# Patient Record
Sex: MALE | Race: WHITE | NOT HISPANIC OR LATINO | Employment: OTHER | URBAN - METROPOLITAN AREA
[De-identification: names, ages, dates, MRNs, and addresses within clinical notes are randomized per-mention and may not be internally consistent; named-entity substitution may affect disease eponyms.]

---

## 2017-05-19 ENCOUNTER — APPOINTMENT (OUTPATIENT)
Dept: WOUND CARE | Facility: HOSPITAL | Age: 57
End: 2017-05-19
Attending: SURGERY

## 2017-05-25 ENCOUNTER — APPOINTMENT (OUTPATIENT)
Dept: WOUND CARE | Facility: HOSPITAL | Age: 57
End: 2017-05-25
Attending: SURGERY

## 2017-05-25 ENCOUNTER — TRANSCRIBE ORDERS (OUTPATIENT)
Dept: ADMINISTRATIVE | Facility: HOSPITAL | Age: 57
End: 2017-05-25

## 2017-05-25 DIAGNOSIS — E11.622 DIABETES MELLITUS WITH SKIN ULCER (HCC): ICD-10-CM

## 2017-05-25 DIAGNOSIS — L98.499 DIABETES MELLITUS WITH SKIN ULCER (HCC): ICD-10-CM

## 2017-05-25 DIAGNOSIS — T87.44 INFECTION OF AMPUTATION STUMP OF LEFT LOWER EXTREMITY (HCC): ICD-10-CM

## 2017-05-25 DIAGNOSIS — T87.44 INFECTION OF LEFT BELOW KNEE AMPUTATION (HCC): Primary | ICD-10-CM

## 2017-05-25 PROCEDURE — G0463 HOSPITAL OUTPT CLINIC VISIT: HCPCS

## 2017-05-31 ENCOUNTER — TRANSCRIBE ORDERS (OUTPATIENT)
Dept: WOUND CARE | Facility: HOSPITAL | Age: 57
End: 2017-05-31

## 2017-05-31 ENCOUNTER — LAB (OUTPATIENT)
Dept: LAB | Facility: HOSPITAL | Age: 57
End: 2017-05-31
Attending: SURGERY

## 2017-05-31 ENCOUNTER — HOSPITAL ENCOUNTER (OUTPATIENT)
Dept: MRI IMAGING | Facility: HOSPITAL | Age: 57
Discharge: HOME | End: 2017-05-31
Attending: SURGERY | Admitting: SURGERY

## 2017-05-31 DIAGNOSIS — T87.44 INFECTION OF AMPUTATION STUMP OF LEFT LOWER EXTREMITY (HCC): ICD-10-CM

## 2017-05-31 DIAGNOSIS — E11.622 DIABETES MELLITUS WITH SKIN ULCER (HCC): ICD-10-CM

## 2017-05-31 DIAGNOSIS — T87.44 INFECTION OF LEFT BELOW KNEE AMPUTATION (HCC): ICD-10-CM

## 2017-05-31 DIAGNOSIS — L98.499 DIABETES MELLITUS WITH SKIN ULCER (HCC): ICD-10-CM

## 2017-05-31 DIAGNOSIS — T87.44 INFECTION OF LEFT BELOW KNEE AMPUTATION (HCC): Primary | ICD-10-CM

## 2017-05-31 LAB
ALBUMIN SERPL-MCNC: 3.2 G/DL (ref 3.5–5.2)
ALBUMIN/GLOB SERPL: 0.9 G/DL
ALP SERPL-CCNC: 86 U/L (ref 39–117)
ALT SERPL W P-5'-P-CCNC: 10 U/L (ref 1–41)
ANION GAP SERPL CALCULATED.3IONS-SCNC: 15.1 MMOL/L
AST SERPL-CCNC: 13 U/L (ref 1–40)
BASOPHILS # BLD AUTO: 0.03 10*3/MM3 (ref 0–0.2)
BASOPHILS NFR BLD AUTO: 0.4 % (ref 0–1.5)
BILIRUB SERPL-MCNC: 0.2 MG/DL (ref 0.1–1.2)
BUN BLD-MCNC: 30 MG/DL (ref 6–20)
BUN/CREAT SERPL: 16.6 (ref 7–25)
CALCIUM SPEC-SCNC: 8.6 MG/DL (ref 8.6–10.5)
CHLORIDE SERPL-SCNC: 105 MMOL/L (ref 98–107)
CO2 SERPL-SCNC: 19.9 MMOL/L (ref 22–29)
CREAT BLD-MCNC: 1.81 MG/DL (ref 0.76–1.27)
CRP SERPL-MCNC: 1.16 MG/DL (ref 0–0.5)
DEPRECATED RDW RBC AUTO: 48.1 FL (ref 37–54)
EOSINOPHIL # BLD AUTO: 0.43 10*3/MM3 (ref 0–0.7)
EOSINOPHIL NFR BLD AUTO: 5.2 % (ref 0.3–6.2)
ERYTHROCYTE [DISTWIDTH] IN BLOOD BY AUTOMATED COUNT: 14.4 % (ref 11.5–14.5)
ERYTHROCYTE [SEDIMENTATION RATE] IN BLOOD: >140 MM/HR (ref 0–20)
GFR SERPL CREATININE-BSD FRML MDRD: 39 ML/MIN/1.73
GLOBULIN UR ELPH-MCNC: 3.6 GM/DL
GLUCOSE BLD-MCNC: 99 MG/DL (ref 65–99)
HCT VFR BLD AUTO: 29.7 % (ref 40.4–52.2)
HGB BLD-MCNC: 10.2 G/DL (ref 13.7–17.6)
IMM GRANULOCYTES # BLD: 0.02 10*3/MM3 (ref 0–0.03)
IMM GRANULOCYTES NFR BLD: 0.2 % (ref 0–0.5)
LYMPHOCYTES # BLD AUTO: 1.08 10*3/MM3 (ref 0.9–4.8)
LYMPHOCYTES NFR BLD AUTO: 13.2 % (ref 19.6–45.3)
MCH RBC QN AUTO: 31.8 PG (ref 27–32.7)
MCHC RBC AUTO-ENTMCNC: 34.3 G/DL (ref 32.6–36.4)
MCV RBC AUTO: 92.5 FL (ref 79.8–96.2)
MONOCYTES # BLD AUTO: 0.58 10*3/MM3 (ref 0.2–1.2)
MONOCYTES NFR BLD AUTO: 7.1 % (ref 5–12)
NEUTROPHILS # BLD AUTO: 6.07 10*3/MM3 (ref 1.9–8.1)
NEUTROPHILS NFR BLD AUTO: 73.9 % (ref 42.7–76)
PLATELET # BLD AUTO: 188 10*3/MM3 (ref 140–500)
PMV BLD AUTO: 9.3 FL (ref 6–12)
POTASSIUM BLD-SCNC: 3.5 MMOL/L (ref 3.5–5.2)
PROT SERPL-MCNC: 6.8 G/DL (ref 6–8.5)
RBC # BLD AUTO: 3.21 10*6/MM3 (ref 4.6–6)
SODIUM BLD-SCNC: 140 MMOL/L (ref 136–145)
WBC NRBC COR # BLD: 8.21 10*3/MM3 (ref 4.5–10.7)

## 2017-05-31 PROCEDURE — 85025 COMPLETE CBC W/AUTO DIFF WBC: CPT

## 2017-05-31 PROCEDURE — A9577 INJ MULTIHANCE: HCPCS | Performed by: SURGERY

## 2017-05-31 PROCEDURE — 82565 ASSAY OF CREATININE: CPT

## 2017-05-31 PROCEDURE — 80053 COMPREHEN METABOLIC PANEL: CPT

## 2017-05-31 PROCEDURE — 0 GADOBENATE DIMEGLUMINE 529 MG/ML SOLUTION: Performed by: SURGERY

## 2017-05-31 PROCEDURE — 85652 RBC SED RATE AUTOMATED: CPT

## 2017-05-31 PROCEDURE — 36415 COLL VENOUS BLD VENIPUNCTURE: CPT

## 2017-05-31 PROCEDURE — 73720 MRI LWR EXTREMITY W/O&W/DYE: CPT

## 2017-05-31 PROCEDURE — 86140 C-REACTIVE PROTEIN: CPT

## 2017-05-31 RX ADMIN — GADOBENATE DIMEGLUMINE 20 ML: 529 INJECTION, SOLUTION INTRAVENOUS at 07:34

## 2017-06-01 LAB — CREAT BLDA-MCNC: 1.8 MG/DL (ref 0.6–1.3)

## 2017-06-02 ENCOUNTER — APPOINTMENT (OUTPATIENT)
Dept: WOUND CARE | Facility: HOSPITAL | Age: 57
End: 2017-06-02
Attending: SURGERY

## 2017-06-02 PROCEDURE — G0463 HOSPITAL OUTPT CLINIC VISIT: HCPCS

## 2017-06-09 ENCOUNTER — APPOINTMENT (OUTPATIENT)
Dept: WOUND CARE | Facility: HOSPITAL | Age: 57
End: 2017-06-09
Attending: SURGERY

## 2017-06-16 ENCOUNTER — LAB REQUISITION (OUTPATIENT)
Dept: LAB | Facility: HOSPITAL | Age: 57
End: 2017-06-16

## 2017-06-16 ENCOUNTER — APPOINTMENT (OUTPATIENT)
Dept: WOUND CARE | Facility: HOSPITAL | Age: 57
End: 2017-06-16
Attending: SURGERY

## 2017-06-16 DIAGNOSIS — E11.622 TYPE 2 DIABETES MELLITUS WITH OTHER SKIN ULCER (CODE) (HCC): ICD-10-CM

## 2017-06-16 DIAGNOSIS — T87.44 INFECTION OF AMPUTATION STUMP OF LEFT LOWER EXTREMITY (HCC): ICD-10-CM

## 2017-06-16 PROCEDURE — 87205 SMEAR GRAM STAIN: CPT | Performed by: SURGERY

## 2017-06-16 PROCEDURE — 87070 CULTURE OTHR SPECIMN AEROBIC: CPT | Performed by: SURGERY

## 2017-06-16 PROCEDURE — 87186 SC STD MICRODIL/AGAR DIL: CPT | Performed by: SURGERY

## 2017-06-16 PROCEDURE — 87075 CULTR BACTERIA EXCEPT BLOOD: CPT | Performed by: SURGERY

## 2017-06-16 PROCEDURE — G0463 HOSPITAL OUTPT CLINIC VISIT: HCPCS

## 2017-06-18 LAB
BACTERIA SPEC AEROBE CULT: ABNORMAL
GRAM STN SPEC: ABNORMAL
GRAM STN SPEC: ABNORMAL

## 2017-06-21 LAB — BACTERIA SPEC ANAEROBE CULT: NORMAL

## 2017-07-03 ENCOUNTER — TRANSCRIBE ORDERS (OUTPATIENT)
Dept: ADMINISTRATIVE | Facility: HOSPITAL | Age: 57
End: 2017-07-03

## 2017-07-03 ENCOUNTER — OFFICE VISIT (OUTPATIENT)
Dept: INFECTIOUS DISEASES | Facility: CLINIC | Age: 57
End: 2017-07-03

## 2017-07-03 VITALS
SYSTOLIC BLOOD PRESSURE: 176 MMHG | HEIGHT: 74 IN | HEART RATE: 60 BPM | DIASTOLIC BLOOD PRESSURE: 73 MMHG | WEIGHT: 220 LBS | TEMPERATURE: 98.5 F | BODY MASS INDEX: 28.23 KG/M2

## 2017-07-03 DIAGNOSIS — M86.18 ACUTE OSTEOMYELITIS OF OTHER SITE: Primary | ICD-10-CM

## 2017-07-03 DIAGNOSIS — I73.9 PVD (PERIPHERAL VASCULAR DISEASE) (HCC): ICD-10-CM

## 2017-07-03 DIAGNOSIS — M86.172 OSTEOMYELITIS OF FOOT, LEFT, ACUTE (HCC): Primary | ICD-10-CM

## 2017-07-03 DIAGNOSIS — A49.02 MRSA INFECTION: ICD-10-CM

## 2017-07-03 DIAGNOSIS — E11.21 CONTROLLED TYPE 2 DIABETES MELLITUS WITH DIABETIC NEPHROPATHY, WITHOUT LONG-TERM CURRENT USE OF INSULIN (HCC): ICD-10-CM

## 2017-07-03 PROCEDURE — 99205 OFFICE O/P NEW HI 60 MIN: CPT | Performed by: INTERNAL MEDICINE

## 2017-07-03 RX ORDER — POTASSIUM CHLORIDE 20 MEQ/1
20 TABLET, EXTENDED RELEASE ORAL 2 TIMES DAILY
Status: ON HOLD | COMMUNITY
End: 2017-07-14

## 2017-07-03 RX ORDER — CHLORAL HYDRATE 500 MG
CAPSULE ORAL
COMMUNITY

## 2017-07-03 RX ORDER — ATORVASTATIN CALCIUM 40 MG/1
40 TABLET, FILM COATED ORAL DAILY
COMMUNITY

## 2017-07-03 RX ORDER — SERTRALINE HYDROCHLORIDE 100 MG/1
100 TABLET, FILM COATED ORAL DAILY
COMMUNITY

## 2017-07-03 RX ORDER — OXYCODONE AND ACETAMINOPHEN 10; 325 MG/1; MG/1
1 TABLET ORAL EVERY 6 HOURS PRN
Status: ON HOLD | COMMUNITY
End: 2017-07-25

## 2017-07-03 RX ORDER — FERROUS SULFATE 325(65) MG
325 TABLET ORAL
Status: ON HOLD | COMMUNITY
End: 2017-07-14

## 2017-07-03 RX ORDER — RANITIDINE 150 MG/1
150 TABLET ORAL 2 TIMES DAILY
Status: ON HOLD | COMMUNITY
End: 2017-07-14 | Stop reason: ALTCHOICE

## 2017-07-03 RX ORDER — ASPIRIN 81 MG/1
81 TABLET ORAL DAILY
COMMUNITY
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
## 2017-07-05 ENCOUNTER — TELEPHONE (OUTPATIENT)
Dept: INFECTIOUS DISEASES | Facility: CLINIC | Age: 57
End: 2017-07-05

## 2017-07-05 ENCOUNTER — APPOINTMENT (OUTPATIENT)
Dept: WOUND CARE | Facility: HOSPITAL | Age: 57
End: 2017-07-05
Attending: SURGERY

## 2017-07-05 DIAGNOSIS — M86.152 ACUTE OSTEOMYELITIS OF LEFT FEMUR (HCC): Primary | ICD-10-CM

## 2017-07-05 PROCEDURE — G0463 HOSPITAL OUTPT CLINIC VISIT: HCPCS

## 2017-07-05 NOTE — TELEPHONE ENCOUNTER
"Phone with Mr. Blackwood. I called to inform him of his PICC line insertion tomorrow, his blood work that needs to be drawn tomorrow (order in per Dr. Jung), his follow up appointment for 8/14/17 and his co-pay for the his Vancomycin is $1900.00 per Celestine with [facility]. Patient said that is okay because he is getting his Medicaid card tomorrow and \"they will back date everything for 3 months\". I told him I was not sure of that and if not, the cost could be out of pocket. He said it was okay. I called Celestine with [facility] and he will have his IV ABX to him by Friday. Advised him to call us with any questions or problems once he begins the ABX. Maddie with [facility] has him set up for weekly dressing changes and labs beginning 7/10/17--Liss Villegas RN  "

## 2017-07-06 ENCOUNTER — LAB (OUTPATIENT)
Dept: LAB | Facility: HOSPITAL | Age: 57
End: 2017-07-06

## 2017-07-06 ENCOUNTER — HOSPITAL ENCOUNTER (OUTPATIENT)
Dept: GENERAL RADIOLOGY | Facility: HOSPITAL | Age: 57
Discharge: HOME | End: 2017-07-06
Attending: INTERNAL MEDICINE | Admitting: INTERNAL MEDICINE

## 2017-07-06 VITALS
RESPIRATION RATE: 18 BRPM | DIASTOLIC BLOOD PRESSURE: 67 MMHG | BODY MASS INDEX: 28.23 KG/M2 | SYSTOLIC BLOOD PRESSURE: 173 MMHG | TEMPERATURE: 98.4 F | HEIGHT: 74 IN | WEIGHT: 220 LBS | OXYGEN SATURATION: 98 % | HEART RATE: 64 BPM

## 2017-07-06 DIAGNOSIS — M86.152 ACUTE OSTEOMYELITIS OF LEFT FEMUR (HCC): ICD-10-CM

## 2017-07-06 DIAGNOSIS — M86.172 OSTEOMYELITIS OF FOOT, LEFT, ACUTE (HCC): ICD-10-CM

## 2017-07-06 DIAGNOSIS — A49.02 MRSA INFECTION: ICD-10-CM

## 2017-07-06 LAB
ALBUMIN SERPL-MCNC: 3.1 G/DL (ref 3.5–5.2)
ALBUMIN/GLOB SERPL: 0.9 G/DL
ALP SERPL-CCNC: 94 U/L (ref 39–117)
ALT SERPL W P-5'-P-CCNC: 12 U/L (ref 1–41)
ANION GAP SERPL CALCULATED.3IONS-SCNC: 12 MMOL/L
AST SERPL-CCNC: 11 U/L (ref 1–40)
BASOPHILS # BLD AUTO: 0.04 10*3/MM3 (ref 0–0.2)
BASOPHILS NFR BLD AUTO: 0.5 % (ref 0–1.5)
BILIRUB SERPL-MCNC: <0.2 MG/DL (ref 0.1–1.2)
BUN BLD-MCNC: 23 MG/DL (ref 6–20)
BUN/CREAT SERPL: 11.4 (ref 7–25)
CALCIUM SPEC-SCNC: 8.9 MG/DL (ref 8.6–10.5)
CHLORIDE SERPL-SCNC: 103 MMOL/L (ref 98–107)
CO2 SERPL-SCNC: 25 MMOL/L (ref 22–29)
CREAT BLD-MCNC: 2.01 MG/DL (ref 0.76–1.27)
CRP SERPL-MCNC: 1.3 MG/DL (ref 0–0.5)
DEPRECATED RDW RBC AUTO: 48.8 FL (ref 37–54)
EOSINOPHIL # BLD AUTO: 0.45 10*3/MM3 (ref 0–0.7)
EOSINOPHIL NFR BLD AUTO: 5.2 % (ref 0.3–6.2)
ERYTHROCYTE [DISTWIDTH] IN BLOOD BY AUTOMATED COUNT: 14.3 % (ref 11.5–14.5)
ERYTHROCYTE [SEDIMENTATION RATE] IN BLOOD: >140 MM/HR (ref 0–20)
GFR SERPL CREATININE-BSD FRML MDRD: 34 ML/MIN/1.73
GLOBULIN UR ELPH-MCNC: 3.5 GM/DL
GLUCOSE BLD-MCNC: 347 MG/DL (ref 65–99)
HCT VFR BLD AUTO: 26.9 % (ref 40.4–52.2)
HGB BLD-MCNC: 9.2 G/DL (ref 13.7–17.6)
IMM GRANULOCYTES # BLD: 0.04 10*3/MM3 (ref 0–0.03)
IMM GRANULOCYTES NFR BLD: 0.5 % (ref 0–0.5)
LYMPHOCYTES # BLD AUTO: 0.96 10*3/MM3 (ref 0.9–4.8)
LYMPHOCYTES NFR BLD AUTO: 11.1 % (ref 19.6–45.3)
MCH RBC QN AUTO: 31.7 PG (ref 27–32.7)
MCHC RBC AUTO-ENTMCNC: 34.2 G/DL (ref 32.6–36.4)
MCV RBC AUTO: 92.8 FL (ref 79.8–96.2)
MONOCYTES # BLD AUTO: 0.52 10*3/MM3 (ref 0.2–1.2)
MONOCYTES NFR BLD AUTO: 6 % (ref 5–12)
NEUTROPHILS # BLD AUTO: 6.65 10*3/MM3 (ref 1.9–8.1)
NEUTROPHILS NFR BLD AUTO: 76.7 % (ref 42.7–76)
PLATELET # BLD AUTO: 209 10*3/MM3 (ref 140–500)
PMV BLD AUTO: 8.9 FL (ref 6–12)
POTASSIUM BLD-SCNC: 4.4 MMOL/L (ref 3.5–5.2)
PROT SERPL-MCNC: 6.6 G/DL (ref 6–8.5)
RBC # BLD AUTO: 2.9 10*6/MM3 (ref 4.6–6)
SODIUM BLD-SCNC: 140 MMOL/L (ref 136–145)
WBC NRBC COR # BLD: 8.66 10*3/MM3 (ref 4.5–10.7)

## 2017-07-06 PROCEDURE — 85025 COMPLETE CBC W/AUTO DIFF WBC: CPT

## 2017-07-06 PROCEDURE — 85652 RBC SED RATE AUTOMATED: CPT

## 2017-07-06 PROCEDURE — 36415 COLL VENOUS BLD VENIPUNCTURE: CPT

## 2017-07-06 PROCEDURE — 25010000002 VANCOMYCIN: Performed by: INTERNAL MEDICINE

## 2017-07-06 PROCEDURE — 86140 C-REACTIVE PROTEIN: CPT

## 2017-07-06 PROCEDURE — 80053 COMPREHEN METABOLIC PANEL: CPT

## 2017-07-06 PROCEDURE — C1751 CATH, INF, PER/CENT/MIDLINE: HCPCS

## 2017-07-06 PROCEDURE — 76937 US GUIDE VASCULAR ACCESS: CPT

## 2017-07-06 PROCEDURE — 77001 FLUOROGUIDE FOR VEIN DEVICE: CPT

## 2017-07-06 RX ORDER — LIDOCAINE HYDROCHLORIDE 10 MG/ML
10 INJECTION, SOLUTION INFILTRATION; PERINEURAL ONCE
Status: COMPLETED | OUTPATIENT
Start: 2017-07-06 | End: 2017-07-06

## 2017-07-06 RX ADMIN — LIDOCAINE HYDROCHLORIDE 7 ML: 10 INJECTION, SOLUTION INFILTRATION; PERINEURAL at 10:36

## 2017-07-06 RX ADMIN — VANCOMYCIN HYDROCHLORIDE 1250 MG: 1 INJECTION, POWDER, LYOPHILIZED, FOR SOLUTION INTRAVENOUS at 11:11

## 2017-07-06 NOTE — DISCHARGE INSTRUCTIONS
EDUCATION INSTRUCTIONS PICC LINE  INSERTION   A peripherally inserted central catheter (PICC) line is a silicone or polyurethane soft tube that a specially trained nurse/physician inserts into the appropriate vein in your arm.  It is then threaded into a large vein.  This provides a safe, reliable access for drugs, IV fluids and blood sampling.  You will lie with your arm extended from  Your body.  A tourniquet will be placed on your upper arm to distend the vessels and the appropriate site will be chosen. (An ultra sound machine may be used to locate the blood vessel)   The  tourniquet will be loosened while measurements are made to determine the distance from the insertion site to the location of the desired tip placement.  Your arm will be cleansed with antiseptic swabs.  The tourniquet will be reapplied and sterile drapes will be placed around the insertion site.  A local anesthetic will be used to numb the insertion site.  The catheter will be inserted into the selected vessel, the tourniquet will be removed and the catheter will be threaded to the location determined by the previous measurements.  You may be asked to turn your head (chin on shoulder) toward the puncture site to aid in the prevention of improper placement. A chest xray will be performed to ensure proper placement.  An adhesive dressing will be applied leaving a short portion of the catheter visible.   RISKS OF THE PROCEDURE INCLUDE, BUT ARE NOT LIMITED TO:  Infection, air clotting, catheter tip moving, catheter blockage and phlebitis.  The patient must be careful of the tube that extends outside the dressing.  BENEFITS OF THE PROCEDURE:  It can be used repeatedly for medications, blood or blood withdrawal for several months.  It also reduces the number of needle sticks a patient must endure.  ALTERNATIVES:  A central line catheter placed in the subclavian vein or jugular vein or implanted vascular access device.  RISK OF ALTERNATIVES:   Infection, clot formation, tubing that lays outside of the dressing that require routine flushing and collapsed lung.  BENEFIT TO ALTERNATIVES:  You can administer large amounts of IV fluids.  Blood samples can be drawn repeatedly and it reduces the number of sticks a patient must endure.    I HAVE READ OR THIS FORM WAS READ TO ME AND I FULLY UNDERSTAND THE PROCEDURE BEING PERFORMED.     PATIENT INITIALS_____________________TIME 0950      PICC LINE CARE INSTRUCTIONS WERE GIVEN TO ME PRIOR TO DISCHARGE AND I FULLY UNDERSTAND THE INSTRUCTIONS.

## 2017-07-06 NOTE — NURSING NOTE
Patient finished IV abx via PICC. PICC flushed with NS after IV abx finished infusing. Patient has no complaints at this time. Patient has had IV vancomycin multiple times before as well as had a PICC placed before. Patient wheeled via wheelchair with spouse to go have lab work completed.

## 2017-07-06 NOTE — NURSING NOTE
Patient had breakfast box and is resting comfortably with friend.   All discharge information given to patient along with PICC line card and care.  Pharmacy brought home meds and equipment.  Friend took this to car.

## 2017-07-06 NOTE — NURSING NOTE
PICC line in right upper arm with clean dressing in place.  No complaints of discomfort.  Waiting for antibiotics to be ready.

## 2017-07-10 ENCOUNTER — TELEPHONE (OUTPATIENT)
Dept: INFECTIOUS DISEASES | Facility: CLINIC | Age: 57
End: 2017-07-10

## 2017-07-10 NOTE — TELEPHONE ENCOUNTER
Patient wanted to make Dr. Jung aware that he is scheduled for amputation on 07/14 @ 11:45 w/Dr. Hawthorne at [facility], [place], operated by [facility].

## 2017-07-14 ENCOUNTER — ANESTHESIA (OUTPATIENT)
Dept: PERIOP | Facility: HOSPITAL | Age: 57
End: 2017-07-14

## 2017-07-14 ENCOUNTER — HOSPITAL ENCOUNTER (INPATIENT)
Facility: HOSPITAL | Age: 57
LOS: 11 days | Discharge: HOME HEALTH | End: 2017-07-25
Attending: SURGERY | Admitting: SURGERY

## 2017-07-14 ENCOUNTER — ANESTHESIA EVENT (OUTPATIENT)
Dept: PERIOP | Facility: HOSPITAL | Age: 57
End: 2017-07-14

## 2017-07-14 DIAGNOSIS — M86.60 CHRONIC OSTEOMYELITIS (HCC): ICD-10-CM

## 2017-07-14 DIAGNOSIS — R26.89 DECREASED MOBILITY: Primary | ICD-10-CM

## 2017-07-14 PROBLEM — M86.652 CHRONIC OSTEOMYELITIS OF LEFT FEMUR (HCC): Status: ACTIVE | Noted: 2017-07-14

## 2017-07-14 LAB
GLUCOSE BLDC GLUCOMTR-MCNC: 236 MG/DL (ref 70–130)
GLUCOSE BLDC GLUCOMTR-MCNC: 252 MG/DL (ref 70–130)
GLUCOSE BLDC GLUCOMTR-MCNC: 344 MG/DL (ref 70–130)

## 2017-07-14 PROCEDURE — 63710000001 INSULIN ASPART PER 5 UNITS: Performed by: SURGERY

## 2017-07-14 PROCEDURE — 88307 TISSUE EXAM BY PATHOLOGIST: CPT | Performed by: SURGERY

## 2017-07-14 PROCEDURE — 25010000002 ONDANSETRON PER 1 MG: Performed by: NURSE ANESTHETIST, CERTIFIED REGISTERED

## 2017-07-14 PROCEDURE — 25010000002 MIDAZOLAM PER 1 MG: Performed by: ANESTHESIOLOGY

## 2017-07-14 PROCEDURE — 0Y680ZZ DETACHMENT AT LEFT FEMORAL REGION, OPEN APPROACH: ICD-10-PCS | Performed by: SURGERY

## 2017-07-14 PROCEDURE — 25010000002 PROPOFOL 10 MG/ML EMULSION: Performed by: NURSE ANESTHETIST, CERTIFIED REGISTERED

## 2017-07-14 PROCEDURE — 25010000002 MORPHINE SULFATE (PF) 2 MG/ML SOLUTION: Performed by: SURGERY

## 2017-07-14 PROCEDURE — 63710000001 INSULIN DETEMER PER 5 UNITS: Performed by: SURGERY

## 2017-07-14 PROCEDURE — 25010000002 FENTANYL CITRATE (PF) 100 MCG/2ML SOLUTION: Performed by: ANESTHESIOLOGY

## 2017-07-14 PROCEDURE — 25010000002 FENTANYL CITRATE (PF) 100 MCG/2ML SOLUTION: Performed by: NURSE ANESTHETIST, CERTIFIED REGISTERED

## 2017-07-14 PROCEDURE — 25010000003 CEFAZOLIN IN DEXTROSE 2-4 GM/100ML-% SOLUTION: Performed by: SURGERY

## 2017-07-14 PROCEDURE — 82962 GLUCOSE BLOOD TEST: CPT

## 2017-07-14 PROCEDURE — 94799 UNLISTED PULMONARY SVC/PX: CPT

## 2017-07-14 PROCEDURE — 94640 AIRWAY INHALATION TREATMENT: CPT

## 2017-07-14 PROCEDURE — 25010000002 VANCOMYCIN 10 G RECONSTITUTED SOLUTION: Performed by: SURGERY

## 2017-07-14 PROCEDURE — 25010000003 CEFAZOLIN PER 500 MG: Performed by: SURGERY

## 2017-07-14 PROCEDURE — 25010000002 HYDROMORPHONE PER 4 MG: Performed by: NURSE ANESTHETIST, CERTIFIED REGISTERED

## 2017-07-14 RX ORDER — MIDAZOLAM HYDROCHLORIDE 1 MG/ML
1 INJECTION INTRAMUSCULAR; INTRAVENOUS
Status: DISCONTINUED | OUTPATIENT
Start: 2017-07-14 | End: 2017-07-14 | Stop reason: HOSPADM

## 2017-07-14 RX ORDER — LIDOCAINE HYDROCHLORIDE 20 MG/ML
INJECTION, SOLUTION INFILTRATION; PERINEURAL AS NEEDED
Status: DISCONTINUED | OUTPATIENT
Start: 2017-07-14 | End: 2017-07-14 | Stop reason: SURG

## 2017-07-14 RX ORDER — NALOXONE HCL 0.4 MG/ML
0.2 VIAL (ML) INJECTION AS NEEDED
Status: DISCONTINUED | OUTPATIENT
Start: 2017-07-14 | End: 2017-07-14 | Stop reason: HOSPADM

## 2017-07-14 RX ORDER — SODIUM CHLORIDE, SODIUM LACTATE, POTASSIUM CHLORIDE, CALCIUM CHLORIDE 600; 310; 30; 20 MG/100ML; MG/100ML; MG/100ML; MG/100ML
9 INJECTION, SOLUTION INTRAVENOUS CONTINUOUS
Status: DISCONTINUED | OUTPATIENT
Start: 2017-07-14 | End: 2017-07-14 | Stop reason: HOSPADM

## 2017-07-14 RX ORDER — ATORVASTATIN CALCIUM 20 MG/1
40 TABLET, FILM COATED ORAL DAILY
Status: DISCONTINUED | OUTPATIENT
Start: 2017-07-14 | End: 2017-07-25 | Stop reason: HOSPADM

## 2017-07-14 RX ORDER — DEXTROSE MONOHYDRATE 25 G/50ML
25 INJECTION, SOLUTION INTRAVENOUS
Status: DISCONTINUED | OUTPATIENT
Start: 2017-07-14 | End: 2017-07-25 | Stop reason: HOSPADM

## 2017-07-14 RX ORDER — GLYCOPYRROLATE 0.2 MG/ML
INJECTION INTRAMUSCULAR; INTRAVENOUS AS NEEDED
Status: DISCONTINUED | OUTPATIENT
Start: 2017-07-14 | End: 2017-07-14 | Stop reason: SURG

## 2017-07-14 RX ORDER — AMLODIPINE BESYLATE 10 MG/1
10 TABLET ORAL DAILY
Status: DISCONTINUED | OUTPATIENT
Start: 2017-07-14 | End: 2017-07-16

## 2017-07-14 RX ORDER — FAMOTIDINE 10 MG/ML
20 INJECTION, SOLUTION INTRAVENOUS 2 TIMES DAILY
Status: DISCONTINUED | OUTPATIENT
Start: 2017-07-14 | End: 2017-07-16

## 2017-07-14 RX ORDER — FLUMAZENIL 0.1 MG/ML
0.2 INJECTION INTRAVENOUS AS NEEDED
Status: DISCONTINUED | OUTPATIENT
Start: 2017-07-14 | End: 2017-07-14 | Stop reason: HOSPADM

## 2017-07-14 RX ORDER — FAMOTIDINE 10 MG/ML
20 INJECTION, SOLUTION INTRAVENOUS ONCE
Status: COMPLETED | OUTPATIENT
Start: 2017-07-14 | End: 2017-07-14

## 2017-07-14 RX ORDER — OXYCODONE AND ACETAMINOPHEN 7.5; 325 MG/1; MG/1
1 TABLET ORAL ONCE AS NEEDED
Status: COMPLETED | OUTPATIENT
Start: 2017-07-14 | End: 2017-07-14

## 2017-07-14 RX ORDER — HYDROMORPHONE HYDROCHLORIDE 1 MG/ML
0.5 INJECTION, SOLUTION INTRAMUSCULAR; INTRAVENOUS; SUBCUTANEOUS
Status: DISCONTINUED | OUTPATIENT
Start: 2017-07-14 | End: 2017-07-14 | Stop reason: HOSPADM

## 2017-07-14 RX ORDER — HYDRALAZINE HYDROCHLORIDE 20 MG/ML
5 INJECTION INTRAMUSCULAR; INTRAVENOUS
Status: DISCONTINUED | OUTPATIENT
Start: 2017-07-14 | End: 2017-07-14 | Stop reason: HOSPADM

## 2017-07-14 RX ORDER — PROMETHAZINE HYDROCHLORIDE 25 MG/ML
12.5 INJECTION, SOLUTION INTRAMUSCULAR; INTRAVENOUS ONCE AS NEEDED
Status: DISCONTINUED | OUTPATIENT
Start: 2017-07-14 | End: 2017-07-14 | Stop reason: HOSPADM

## 2017-07-14 RX ORDER — OXYCODONE AND ACETAMINOPHEN 10; 325 MG/1; MG/1
2 TABLET ORAL EVERY 6 HOURS PRN
Status: DISCONTINUED | OUTPATIENT
Start: 2017-07-14 | End: 2017-07-25 | Stop reason: HOSPADM

## 2017-07-14 RX ORDER — OXYCODONE HYDROCHLORIDE AND ACETAMINOPHEN 5; 325 MG/1; MG/1
1 TABLET ORAL EVERY 4 HOURS PRN
Status: DISCONTINUED | OUTPATIENT
Start: 2017-07-14 | End: 2017-07-25 | Stop reason: HOSPADM

## 2017-07-14 RX ORDER — LABETALOL HYDROCHLORIDE 5 MG/ML
5 INJECTION, SOLUTION INTRAVENOUS
Status: DISCONTINUED | OUTPATIENT
Start: 2017-07-14 | End: 2017-07-14 | Stop reason: HOSPADM

## 2017-07-14 RX ORDER — LISINOPRIL 40 MG/1
40 TABLET ORAL DAILY
Status: DISCONTINUED | OUTPATIENT
Start: 2017-07-14 | End: 2017-07-16

## 2017-07-14 RX ORDER — PROMETHAZINE HYDROCHLORIDE 25 MG/1
12.5 TABLET ORAL ONCE AS NEEDED
Status: DISCONTINUED | OUTPATIENT
Start: 2017-07-14 | End: 2017-07-14 | Stop reason: HOSPADM

## 2017-07-14 RX ORDER — ONDANSETRON 2 MG/ML
4 INJECTION INTRAMUSCULAR; INTRAVENOUS EVERY 6 HOURS PRN
Status: DISCONTINUED | OUTPATIENT
Start: 2017-07-14 | End: 2017-07-25 | Stop reason: HOSPADM

## 2017-07-14 RX ORDER — FENTANYL CITRATE 50 UG/ML
50 INJECTION, SOLUTION INTRAMUSCULAR; INTRAVENOUS
Status: DISCONTINUED | OUTPATIENT
Start: 2017-07-14 | End: 2017-07-14 | Stop reason: HOSPADM

## 2017-07-14 RX ORDER — SERTRALINE HYDROCHLORIDE 100 MG/1
100 TABLET, FILM COATED ORAL DAILY
Status: DISCONTINUED | OUTPATIENT
Start: 2017-07-14 | End: 2017-07-25 | Stop reason: HOSPADM

## 2017-07-14 RX ORDER — GABAPENTIN 300 MG/1
300 CAPSULE ORAL EVERY 12 HOURS SCHEDULED
Status: DISCONTINUED | OUTPATIENT
Start: 2017-07-14 | End: 2017-07-18

## 2017-07-14 RX ORDER — NALOXONE HCL 0.4 MG/ML
0.4 VIAL (ML) INJECTION
Status: DISCONTINUED | OUTPATIENT
Start: 2017-07-14 | End: 2017-07-15

## 2017-07-14 RX ORDER — PROMETHAZINE HYDROCHLORIDE 25 MG/1
25 SUPPOSITORY RECTAL ONCE AS NEEDED
Status: DISCONTINUED | OUTPATIENT
Start: 2017-07-14 | End: 2017-07-14 | Stop reason: HOSPADM

## 2017-07-14 RX ORDER — NITROGLYCERIN 0.4 MG/1
0.4 TABLET SUBLINGUAL
Status: DISCONTINUED | OUTPATIENT
Start: 2017-07-14 | End: 2017-07-25 | Stop reason: HOSPADM

## 2017-07-14 RX ORDER — SENNA AND DOCUSATE SODIUM 50; 8.6 MG/1; MG/1
2 TABLET, FILM COATED ORAL 2 TIMES DAILY PRN
Status: DISCONTINUED | OUTPATIENT
Start: 2017-07-14 | End: 2017-07-25 | Stop reason: HOSPADM

## 2017-07-14 RX ORDER — FENTANYL CITRATE 50 UG/ML
INJECTION, SOLUTION INTRAMUSCULAR; INTRAVENOUS AS NEEDED
Status: DISCONTINUED | OUTPATIENT
Start: 2017-07-14 | End: 2017-07-14 | Stop reason: SURG

## 2017-07-14 RX ORDER — CEFAZOLIN SODIUM 2 G/100ML
2 INJECTION, SOLUTION INTRAVENOUS ONCE
Status: COMPLETED | OUTPATIENT
Start: 2017-07-14 | End: 2017-07-14

## 2017-07-14 RX ORDER — IPRATROPIUM BROMIDE AND ALBUTEROL SULFATE 2.5; .5 MG/3ML; MG/3ML
3 SOLUTION RESPIRATORY (INHALATION)
Status: COMPLETED | OUTPATIENT
Start: 2017-07-14 | End: 2017-07-16

## 2017-07-14 RX ORDER — HYDRALAZINE HYDROCHLORIDE 50 MG/1
100 TABLET, FILM COATED ORAL 3 TIMES DAILY
Status: DISCONTINUED | OUTPATIENT
Start: 2017-07-14 | End: 2017-07-16

## 2017-07-14 RX ORDER — ONDANSETRON 4 MG/1
4 TABLET, ORALLY DISINTEGRATING ORAL EVERY 6 HOURS PRN
Status: DISCONTINUED | OUTPATIENT
Start: 2017-07-14 | End: 2017-07-25 | Stop reason: HOSPADM

## 2017-07-14 RX ORDER — ONDANSETRON 2 MG/ML
4 INJECTION INTRAMUSCULAR; INTRAVENOUS ONCE AS NEEDED
Status: DISCONTINUED | OUTPATIENT
Start: 2017-07-14 | End: 2017-07-14 | Stop reason: HOSPADM

## 2017-07-14 RX ORDER — ASPIRIN 81 MG/1
81 TABLET ORAL DAILY
Status: DISCONTINUED | OUTPATIENT
Start: 2017-07-14 | End: 2017-07-25 | Stop reason: HOSPADM

## 2017-07-14 RX ORDER — MORPHINE SULFATE 2 MG/ML
1 INJECTION, SOLUTION INTRAMUSCULAR; INTRAVENOUS EVERY 4 HOURS PRN
Status: DISCONTINUED | OUTPATIENT
Start: 2017-07-14 | End: 2017-07-15

## 2017-07-14 RX ORDER — PROPOFOL 10 MG/ML
VIAL (ML) INTRAVENOUS AS NEEDED
Status: DISCONTINUED | OUTPATIENT
Start: 2017-07-14 | End: 2017-07-14 | Stop reason: SURG

## 2017-07-14 RX ORDER — FAMOTIDINE 20 MG/1
20 TABLET, FILM COATED ORAL 2 TIMES DAILY
Status: DISCONTINUED | OUTPATIENT
Start: 2017-07-14 | End: 2017-07-16

## 2017-07-14 RX ORDER — DIPHENHYDRAMINE HYDROCHLORIDE 50 MG/ML
12.5 INJECTION INTRAMUSCULAR; INTRAVENOUS
Status: DISCONTINUED | OUTPATIENT
Start: 2017-07-14 | End: 2017-07-14 | Stop reason: HOSPADM

## 2017-07-14 RX ORDER — ONDANSETRON 2 MG/ML
INJECTION INTRAMUSCULAR; INTRAVENOUS AS NEEDED
Status: DISCONTINUED | OUTPATIENT
Start: 2017-07-14 | End: 2017-07-14 | Stop reason: SURG

## 2017-07-14 RX ORDER — TAMSULOSIN HYDROCHLORIDE 0.4 MG/1
0.4 CAPSULE ORAL DAILY
Status: DISCONTINUED | OUTPATIENT
Start: 2017-07-14 | End: 2017-07-18

## 2017-07-14 RX ORDER — NICOTINE POLACRILEX 4 MG
15 LOZENGE BUCCAL
Status: DISCONTINUED | OUTPATIENT
Start: 2017-07-14 | End: 2017-07-25 | Stop reason: HOSPADM

## 2017-07-14 RX ORDER — HYDROCODONE BITARTRATE AND ACETAMINOPHEN 7.5; 325 MG/1; MG/1
1 TABLET ORAL ONCE AS NEEDED
Status: DISCONTINUED | OUTPATIENT
Start: 2017-07-14 | End: 2017-07-14 | Stop reason: HOSPADM

## 2017-07-14 RX ORDER — SODIUM CHLORIDE 0.9 % (FLUSH) 0.9 %
1-10 SYRINGE (ML) INJECTION AS NEEDED
Status: DISCONTINUED | OUTPATIENT
Start: 2017-07-14 | End: 2017-07-14 | Stop reason: HOSPADM

## 2017-07-14 RX ORDER — EPHEDRINE SULFATE 50 MG/ML
INJECTION, SOLUTION INTRAVENOUS AS NEEDED
Status: DISCONTINUED | OUTPATIENT
Start: 2017-07-14 | End: 2017-07-14 | Stop reason: SURG

## 2017-07-14 RX ORDER — SODIUM CHLORIDE 9 MG/ML
125 INJECTION, SOLUTION INTRAVENOUS CONTINUOUS
Status: DISCONTINUED | OUTPATIENT
Start: 2017-07-14 | End: 2017-07-18

## 2017-07-14 RX ORDER — ONDANSETRON 4 MG/1
4 TABLET, FILM COATED ORAL EVERY 6 HOURS PRN
Status: DISCONTINUED | OUTPATIENT
Start: 2017-07-14 | End: 2017-07-25 | Stop reason: HOSPADM

## 2017-07-14 RX ORDER — EPHEDRINE SULFATE 50 MG/ML
5 INJECTION, SOLUTION INTRAVENOUS ONCE AS NEEDED
Status: DISCONTINUED | OUTPATIENT
Start: 2017-07-14 | End: 2017-07-14 | Stop reason: HOSPADM

## 2017-07-14 RX ORDER — NICOTINE 21 MG/24HR
1 PATCH, TRANSDERMAL 24 HOURS TRANSDERMAL EVERY 24 HOURS
Status: DISCONTINUED | OUTPATIENT
Start: 2017-07-14 | End: 2017-07-25 | Stop reason: HOSPADM

## 2017-07-14 RX ORDER — PROMETHAZINE HYDROCHLORIDE 25 MG/1
25 TABLET ORAL ONCE AS NEEDED
Status: DISCONTINUED | OUTPATIENT
Start: 2017-07-14 | End: 2017-07-14 | Stop reason: HOSPADM

## 2017-07-14 RX ORDER — MIDAZOLAM HYDROCHLORIDE 1 MG/ML
2 INJECTION INTRAMUSCULAR; INTRAVENOUS
Status: DISCONTINUED | OUTPATIENT
Start: 2017-07-14 | End: 2017-07-14 | Stop reason: HOSPADM

## 2017-07-14 RX ADMIN — VANCOMYCIN HYDROCHLORIDE 1500 MG: 10 INJECTION, POWDER, LYOPHILIZED, FOR SOLUTION INTRAVENOUS at 20:16

## 2017-07-14 RX ADMIN — TAMSULOSIN HYDROCHLORIDE 0.4 MG: 0.4 CAPSULE ORAL at 20:13

## 2017-07-14 RX ADMIN — MORPHINE SULFATE 1 MG: 2 INJECTION, SOLUTION INTRAMUSCULAR; INTRAVENOUS at 23:59

## 2017-07-14 RX ADMIN — ASPIRIN 81 MG: 81 TABLET ORAL at 20:14

## 2017-07-14 RX ADMIN — MIDAZOLAM 1 MG: 1 INJECTION INTRAMUSCULAR; INTRAVENOUS at 10:54

## 2017-07-14 RX ADMIN — IPRATROPIUM BROMIDE AND ALBUTEROL SULFATE 3 ML: .5; 3 SOLUTION RESPIRATORY (INHALATION) at 23:45

## 2017-07-14 RX ADMIN — PROPOFOL 180 MG: 10 INJECTION, EMULSION INTRAVENOUS at 13:42

## 2017-07-14 RX ADMIN — FENTANYL CITRATE 50 MCG: 50 INJECTION, SOLUTION INTRAMUSCULAR; INTRAVENOUS at 14:18

## 2017-07-14 RX ADMIN — SODIUM CHLORIDE, POTASSIUM CHLORIDE, SODIUM LACTATE AND CALCIUM CHLORIDE 9 ML/HR: 600; 310; 30; 20 INJECTION, SOLUTION INTRAVENOUS at 10:54

## 2017-07-14 RX ADMIN — HYDROMORPHONE HYDROCHLORIDE 0.5 MG: 1 INJECTION, SOLUTION INTRAMUSCULAR; INTRAVENOUS; SUBCUTANEOUS at 16:19

## 2017-07-14 RX ADMIN — FENTANYL CITRATE 50 MCG: 50 INJECTION INTRAMUSCULAR; INTRAVENOUS at 17:20

## 2017-07-14 RX ADMIN — FENTANYL CITRATE 50 MCG: 50 INJECTION INTRAMUSCULAR; INTRAVENOUS at 17:46

## 2017-07-14 RX ADMIN — INSULIN ASPART 7 UNITS: 100 INJECTION, SOLUTION INTRAVENOUS; SUBCUTANEOUS at 20:50

## 2017-07-14 RX ADMIN — LIDOCAINE HYDROCHLORIDE 100 MG: 20 INJECTION, SOLUTION INFILTRATION; PERINEURAL at 13:42

## 2017-07-14 RX ADMIN — FENTANYL CITRATE 50 MCG: 50 INJECTION INTRAMUSCULAR; INTRAVENOUS at 10:54

## 2017-07-14 RX ADMIN — CEFAZOLIN SODIUM 2 G: 2 INJECTION, SOLUTION INTRAVENOUS at 13:35

## 2017-07-14 RX ADMIN — OXYCODONE HYDROCHLORIDE AND ACETAMINOPHEN 1 TABLET: 7.5; 325 TABLET ORAL at 17:52

## 2017-07-14 RX ADMIN — MIDAZOLAM 1 MG: 1 INJECTION INTRAMUSCULAR; INTRAVENOUS at 11:14

## 2017-07-14 RX ADMIN — INSULIN DETEMIR 15 UNITS: 100 INJECTION, SOLUTION SUBCUTANEOUS at 20:49

## 2017-07-14 RX ADMIN — FENTANYL CITRATE 50 MCG: 50 INJECTION INTRAMUSCULAR; INTRAVENOUS at 16:35

## 2017-07-14 RX ADMIN — HYDRALAZINE HYDROCHLORIDE 100 MG: 50 TABLET, FILM COATED ORAL at 20:13

## 2017-07-14 RX ADMIN — NICOTINE 1 PATCH: 14 PATCH, EXTENDED RELEASE TRANSDERMAL at 20:51

## 2017-07-14 RX ADMIN — ONDANSETRON 4 MG: 2 INJECTION INTRAMUSCULAR; INTRAVENOUS at 14:15

## 2017-07-14 RX ADMIN — FENTANYL CITRATE 50 MCG: 50 INJECTION, SOLUTION INTRAMUSCULAR; INTRAVENOUS at 14:37

## 2017-07-14 RX ADMIN — HYDROMORPHONE HYDROCHLORIDE 0.5 MG: 1 INJECTION, SOLUTION INTRAMUSCULAR; INTRAVENOUS; SUBCUTANEOUS at 17:01

## 2017-07-14 RX ADMIN — OXYCODONE HYDROCHLORIDE AND ACETAMINOPHEN 1 TABLET: 10; 325 TABLET ORAL at 18:33

## 2017-07-14 RX ADMIN — ATORVASTATIN CALCIUM 40 MG: 20 TABLET, FILM COATED ORAL at 20:13

## 2017-07-14 RX ADMIN — FENTANYL CITRATE 50 MCG: 50 INJECTION, SOLUTION INTRAMUSCULAR; INTRAVENOUS at 16:05

## 2017-07-14 RX ADMIN — GLYCOPYRROLATE 0.2 MG: 0.2 INJECTION INTRAMUSCULAR; INTRAVENOUS at 13:47

## 2017-07-14 RX ADMIN — SERTRALINE 100 MG: 100 TABLET, FILM COATED ORAL at 20:16

## 2017-07-14 RX ADMIN — SODIUM CHLORIDE, POTASSIUM CHLORIDE, SODIUM LACTATE AND CALCIUM CHLORIDE: 600; 310; 30; 20 INJECTION, SOLUTION INTRAVENOUS at 15:52

## 2017-07-14 RX ADMIN — FENTANYL CITRATE 50 MCG: 50 INJECTION INTRAMUSCULAR; INTRAVENOUS at 16:45

## 2017-07-14 RX ADMIN — EPHEDRINE SULFATE 10 MG: 50 INJECTION INTRAMUSCULAR; INTRAVENOUS; SUBCUTANEOUS at 15:24

## 2017-07-14 RX ADMIN — AMLODIPINE BESYLATE 10 MG: 10 TABLET ORAL at 20:15

## 2017-07-14 RX ADMIN — FENTANYL CITRATE 50 MCG: 50 INJECTION, SOLUTION INTRAMUSCULAR; INTRAVENOUS at 16:00

## 2017-07-14 RX ADMIN — FENTANYL CITRATE 50 MCG: 50 INJECTION, SOLUTION INTRAMUSCULAR; INTRAVENOUS at 13:40

## 2017-07-14 RX ADMIN — HYDROMORPHONE HYDROCHLORIDE 0.5 MG: 1 INJECTION, SOLUTION INTRAMUSCULAR; INTRAVENOUS; SUBCUTANEOUS at 16:27

## 2017-07-14 RX ADMIN — MORPHINE SULFATE 1 MG: 2 INJECTION, SOLUTION INTRAMUSCULAR; INTRAVENOUS at 20:16

## 2017-07-14 RX ADMIN — FAMOTIDINE 20 MG: 10 INJECTION INTRAVENOUS at 10:54

## 2017-07-14 RX ADMIN — GABAPENTIN 300 MG: 300 CAPSULE ORAL at 20:13

## 2017-07-14 RX ADMIN — LISINOPRIL 40 MG: 40 TABLET ORAL at 20:13

## 2017-07-14 RX ADMIN — GLYCOPYRROLATE 0.2 MG: 0.2 INJECTION INTRAMUSCULAR; INTRAVENOUS at 13:40

## 2017-07-14 RX ADMIN — EPHEDRINE SULFATE 10 MG: 50 INJECTION INTRAMUSCULAR; INTRAVENOUS; SUBCUTANEOUS at 15:20

## 2017-07-14 RX ADMIN — SODIUM CHLORIDE 125 ML/HR: 9 INJECTION, SOLUTION INTRAVENOUS at 20:16

## 2017-07-14 RX ADMIN — FENTANYL CITRATE 25 MCG: 50 INJECTION INTRAMUSCULAR; INTRAVENOUS at 11:14
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
## 2017-07-14 NOTE — OP NOTE
Operative Note  Date of Admission:  7/14/2017  OR Date: 7/14/2017    Pre-op Diagnosis:   * Osteomyelitis of left femur [M86.9]    Post-op Diagnosis:     Post-Op Diagnosis Codes:     * Osteomyelitis of left femur [M86.9]    Procedure:   1) revision of left above the knee amputation    Surgeon: Mark Hawthorne MD    Assistant: Heidi Santiago    Anesthesia: General    Staff:   Circulator: Gladys Donaldson RN; Debi Solares RN  Scrub Person: Miriam Woodruff; Ashtyn Riley    Estimated Blood Loss: 100-250 cc    Complications: None    Findings: Femur at the level of transection was healthy.  Chronic sinus tract was not entered.  It was a large osteophyte growing medially off of the femur.    Indications:    The patient is an 57 y.o. male seen for evaluation of a previously mangled extremity that resulted in a left above-the-knee amputation.  Last surgery was a couple years ago but he's developed a chronic draining sinus tract consistent with Chronic osteomyelitis.        Procedure:    Patient was taken to the operating and placed supine on operating table.  After induction of IV sedation general anesthesia the left leg was prepped and draped with Betadine paint and scrub.  The sinus tract was covered with a Tegaderm dressing in order to decrease any drainage that may come out onto the field.  A fishmouth incision was drawn that would allow bone transection at least 6 cm above the end of the current distal femur in order to get above the bony changes seen on MRI.  Skin and subcutaneous tenderness tissues and all the muscles were divided with a combination of sharp but primarily Bovie electrocautery.  The tissue was chronically inflamed and scarred particularly out laterally where looked like the wound healed in part by secondary intention.  The superficial femoral artery and femoral vein were identified medially.  There also large collaterals in this area there were also tied and dealt with individually.  The  nerve was also re-transected and anesthetized.  There was a large bony osteophyte the graft medially that displaced the femoral artery and femoral vein and made it somewhat difficult to dissect out.  Once I was able to get control of this ties were placed appropriately and and the femur was transected with a powered saw.  The majority of the posterior flap was made with Bovie electrocautery as well but some of it made was made with an amputation knife.  He had a lot of bleeding points that required a combination of cautery, ties, and suture ligatures to get good hemostasis.  The wound is copiously irrigated with an embolic irrigation.  I used 2 interrupted figure-of-eight sutures to reapproximate tissue over the bone and then closed all of the fascia with multiple interrupted 2-0 Vicryl sutures.  Staples are applied.  Patient understood well and no intraoperative complications.        Active Hospital Problems (** Indicates Principal Problem)    Diagnosis Date Noted   • **Chronic osteomyelitis of left femur [M86.652] 07/14/2017   • Chronic osteomyelitis [M86.60] 07/14/2017      Resolved Hospital Problems    Diagnosis Date Noted Date Resolved   No resolved problems to display.      Mark Hawthorne MD     Date: 7/14/2017  Time: 4:06 PM

## 2017-07-14 NOTE — PLAN OF CARE
Problem: Patient Care Overview (Adult)  Goal: Plan of Care Review  Outcome: Ongoing (interventions implemented as appropriate)    07/14/17 1044   Coping/Psychosocial Response Interventions   Plan Of Care Reviewed With patient   Patient Care Overview   Progress no change       Goal: Adult Individualization and Mutuality  Outcome: Ongoing (interventions implemented as appropriate)    07/14/17 1044   Individualization   Patient Specific Preferences call Luc   Patient Specific Goals return home, all infection gone from stump   Patient Specific Interventions teach S&S of infection   Mutuality/Individual Preferences   What Anxieties, Fears or Concerns Do You Have About Your Health or Care? none   What Questions Do You Have About Your Health or Care? none   What Information Would Help Us Give You More Personalized Care? none       Goal: Discharge Needs Assessment  Outcome: Ongoing (interventions implemented as appropriate)    07/14/17 1003 07/14/17 1044   Discharge Needs Assessment   Concerns To Be Addressed --  denies needs/concerns at this time   Readmission Within The Last 30 Days --  no previous admission in last 30 days   Current Health   Anticipated Changes Related to Illness --  none   Self-Care   Equipment Currently Used at Home crutches;walker, rolling;commode --          Problem: Perioperative Period (Adult)  Goal: Signs and Symptoms of Listed Potential Problems Will be Absent or Manageable (Perioperative Period)  Outcome: Ongoing (interventions implemented as appropriate)    07/14/17 1044   Perioperative Period   Problems Assessed (Perioperative Period) pain;hypoxia/hypoxemia;situational response   Problems Present (Perioperative Period) pain

## 2017-07-14 NOTE — PERIOPERATIVE NURSING NOTE
Per Dr. Vito URBANO to use picc line.  Picc line dressing changed using sterile technique, biopatch and stabilizer applied and covered with sterile dressing.  Good blood return from PICC line, flushed well.

## 2017-07-14 NOTE — ANESTHESIA POSTPROCEDURE EVALUATION
Patient: Jay Blackwood    Procedure Summary     Date Anesthesia Start Anesthesia Stop Room / Location    07/14/17 1331 1610  [facility] / [facility]       Procedure Diagnosis Surgeon Provider    REVISION LEFT ABOVE KNEE AMPUTATION  (Left Thigh) Osteomyelitis of left femur MD Christine Fong MD          Anesthesia Type: general  Last vitals  /66 (07/14/17 1700)    Temp      Pulse 59 (07/14/17 1700)   Resp 20 (07/14/17 1700)    SpO2 100 % (07/14/17 1700)      Post Anesthesia Care and Evaluation    Patient location during evaluation: PACU  Patient participation: complete - patient participated  Level of consciousness: awake and alert  Pain management: adequate  Airway patency: patent  Anesthetic complications: No anesthetic complications    Cardiovascular status: acceptable  Respiratory status: acceptable  Hydration status: acceptable

## 2017-07-14 NOTE — ANESTHESIA PROCEDURE NOTES
Airway  Urgency: elective    Airway not difficult    General Information and Staff    Patient location during procedure: OR  Anesthesiologist: PORTIA MELENDEZ  CRNA: ANN OCONNOR    Indications and Patient Condition  Indications for airway management: airway protection    Preoxygenated: yes  MILS maintained throughout  Mask difficulty assessment: 1 - vent by mask    Final Airway Details  Final airway type: supraglottic airway      Successful airway: unique  Size 5  Cuff Pressure (cm H2O): 14  Airway Seal Pressure (cm H2O): 20    Number of attempts at approach: 1

## 2017-07-14 NOTE — INTERVAL H&P NOTE
H&P updated. The patient was examined and the following changes are noted:  The patient was seen in my office on Monday.  He has a chronic sinus tract with a draining infectious source at the femur consistent with chronic osteomyelitis.  We discussed revision surgery in order to bring his above-the-knee amputation much higher to try to get above this 5-6 cm of infection.  He understands that this will leave him with a very short stump and it may not be able to be fitted for prosthesis.  However, he is anxious to move forward in order to relieve himself of this chronic wound which is a major Lui and concern for him.

## 2017-07-14 NOTE — ANESTHESIA PREPROCEDURE EVALUATION
Anesthesia Evaluation     Patient summary reviewed and Nursing notes reviewed     NPO Liquid Status: > 8 hours     Airway   Mallampati: II  Dental    (+) upper dentures        Pulmonary - negative pulmonary ROS and normal exam    breath sounds clear to auscultation  Cardiovascular - normal exam    ECG reviewed    (+) hypertension, dysrhythmias, PVD, DVT, hyperlipidemia      Neuro/Psych- negative ROS  GI/Hepatic/Renal/Endo    (+)  diabetes mellitus well controlled,     Musculoskeletal (-) negative ROS    Abdominal    Substance History - negative use     OB/GYN          Other - negative ROS                                       Anesthesia Plan    ASA 3     general     intravenous induction   Anesthetic plan and risks discussed with patient.

## 2017-07-15 LAB
ABO GROUP BLD: NORMAL
ANION GAP SERPL CALCULATED.3IONS-SCNC: 14.6 MMOL/L
BASOPHILS # BLD AUTO: 0.03 10*3/MM3 (ref 0–0.2)
BASOPHILS NFR BLD AUTO: 0.3 % (ref 0–1.5)
BLD GP AB SCN SERPL QL: NEGATIVE
BUN BLD-MCNC: 35 MG/DL (ref 6–20)
BUN/CREAT SERPL: 11.1 (ref 7–25)
CALCIUM SPEC-SCNC: 7.1 MG/DL (ref 8.6–10.5)
CHLORIDE SERPL-SCNC: 100 MMOL/L (ref 98–107)
CO2 SERPL-SCNC: 18.4 MMOL/L (ref 22–29)
CREAT BLD-MCNC: 3.14 MG/DL (ref 0.76–1.27)
DEPRECATED RDW RBC AUTO: 50.7 FL (ref 37–54)
EOSINOPHIL # BLD AUTO: 0.26 10*3/MM3 (ref 0–0.7)
EOSINOPHIL NFR BLD AUTO: 2.3 % (ref 0.3–6.2)
ERYTHROCYTE [DISTWIDTH] IN BLOOD BY AUTOMATED COUNT: 14.8 % (ref 11.5–14.5)
GFR SERPL CREATININE-BSD FRML MDRD: 21 ML/MIN/1.73
GLUCOSE BLD-MCNC: 291 MG/DL (ref 65–99)
GLUCOSE BLDC GLUCOMTR-MCNC: 138 MG/DL (ref 70–130)
GLUCOSE BLDC GLUCOMTR-MCNC: 154 MG/DL (ref 70–130)
GLUCOSE BLDC GLUCOMTR-MCNC: 171 MG/DL (ref 70–130)
GLUCOSE BLDC GLUCOMTR-MCNC: 285 MG/DL (ref 70–130)
HCT VFR BLD AUTO: 19.5 % (ref 40.4–52.2)
HGB BLD-MCNC: 6.5 G/DL (ref 13.7–17.6)
IMM GRANULOCYTES # BLD: 0.03 10*3/MM3 (ref 0–0.03)
IMM GRANULOCYTES NFR BLD: 0.3 % (ref 0–0.5)
LYMPHOCYTES # BLD AUTO: 1.22 10*3/MM3 (ref 0.9–4.8)
LYMPHOCYTES NFR BLD AUTO: 10.9 % (ref 19.6–45.3)
MCH RBC QN AUTO: 31.7 PG (ref 27–32.7)
MCHC RBC AUTO-ENTMCNC: 33.3 G/DL (ref 32.6–36.4)
MCV RBC AUTO: 95.1 FL (ref 79.8–96.2)
MONOCYTES # BLD AUTO: 0.73 10*3/MM3 (ref 0.2–1.2)
MONOCYTES NFR BLD AUTO: 6.5 % (ref 5–12)
NEUTROPHILS # BLD AUTO: 8.96 10*3/MM3 (ref 1.9–8.1)
NEUTROPHILS NFR BLD AUTO: 79.7 % (ref 42.7–76)
PLATELET # BLD AUTO: 167 10*3/MM3 (ref 140–500)
PMV BLD AUTO: 9.2 FL (ref 6–12)
POTASSIUM BLD-SCNC: 4.4 MMOL/L (ref 3.5–5.2)
RBC # BLD AUTO: 2.05 10*6/MM3 (ref 4.6–6)
RH BLD: NEGATIVE
SODIUM BLD-SCNC: 133 MMOL/L (ref 136–145)
WBC NRBC COR # BLD: 11.23 10*3/MM3 (ref 4.5–10.7)

## 2017-07-15 PROCEDURE — P9016 RBC LEUKOCYTES REDUCED: HCPCS

## 2017-07-15 PROCEDURE — 85025 COMPLETE CBC W/AUTO DIFF WBC: CPT | Performed by: SURGERY

## 2017-07-15 PROCEDURE — 86850 RBC ANTIBODY SCREEN: CPT | Performed by: SURGERY

## 2017-07-15 PROCEDURE — 86923 COMPATIBILITY TEST ELECTRIC: CPT

## 2017-07-15 PROCEDURE — 86900 BLOOD TYPING SEROLOGIC ABO: CPT | Performed by: SURGERY

## 2017-07-15 PROCEDURE — 94799 UNLISTED PULMONARY SVC/PX: CPT

## 2017-07-15 PROCEDURE — 63710000001 INSULIN ASPART PER 5 UNITS: Performed by: SURGERY

## 2017-07-15 PROCEDURE — 25010000002 VANCOMYCIN 10 G RECONSTITUTED SOLUTION: Performed by: SURGERY

## 2017-07-15 PROCEDURE — 99222 1ST HOSP IP/OBS MODERATE 55: CPT | Performed by: INTERNAL MEDICINE

## 2017-07-15 PROCEDURE — 86901 BLOOD TYPING SEROLOGIC RH(D): CPT | Performed by: SURGERY

## 2017-07-15 PROCEDURE — 86900 BLOOD TYPING SEROLOGIC ABO: CPT

## 2017-07-15 PROCEDURE — 36430 TRANSFUSION BLD/BLD COMPNT: CPT

## 2017-07-15 PROCEDURE — 25010000002 ENOXAPARIN PER 10 MG: Performed by: SURGERY

## 2017-07-15 PROCEDURE — 80048 BASIC METABOLIC PNL TOTAL CA: CPT | Performed by: SURGERY

## 2017-07-15 PROCEDURE — 25010000002 MORPHINE PER 10 MG: Performed by: SURGERY

## 2017-07-15 PROCEDURE — 63710000001 INSULIN DETEMER PER 5 UNITS: Performed by: SURGERY

## 2017-07-15 PROCEDURE — 82962 GLUCOSE BLOOD TEST: CPT

## 2017-07-15 PROCEDURE — 25010000002 ONDANSETRON PER 1 MG: Performed by: SURGERY

## 2017-07-15 PROCEDURE — 25010000002 MORPHINE SULFATE (PF) 2 MG/ML SOLUTION: Performed by: SURGERY

## 2017-07-15 RX ORDER — MORPHINE SULFATE 2 MG/ML
2 INJECTION, SOLUTION INTRAMUSCULAR; INTRAVENOUS
Status: DISCONTINUED | OUTPATIENT
Start: 2017-07-15 | End: 2017-07-16

## 2017-07-15 RX ORDER — NALOXONE HCL 0.4 MG/ML
0.4 VIAL (ML) INJECTION
Status: DISCONTINUED | OUTPATIENT
Start: 2017-07-15 | End: 2017-07-25 | Stop reason: HOSPADM

## 2017-07-15 RX ADMIN — IPRATROPIUM BROMIDE AND ALBUTEROL SULFATE 3 ML: .5; 3 SOLUTION RESPIRATORY (INHALATION) at 23:39

## 2017-07-15 RX ADMIN — MORPHINE SULFATE 2 MG: 2 INJECTION, SOLUTION INTRAMUSCULAR; INTRAVENOUS at 21:20

## 2017-07-15 RX ADMIN — MORPHINE SULFATE 2 MG: 2 INJECTION, SOLUTION INTRAMUSCULAR; INTRAVENOUS at 09:16

## 2017-07-15 RX ADMIN — NICOTINE 1 PATCH: 14 PATCH, EXTENDED RELEASE TRANSDERMAL at 21:45

## 2017-07-15 RX ADMIN — OXYCODONE HYDROCHLORIDE AND ACETAMINOPHEN 2 TABLET: 10; 325 TABLET ORAL at 07:24

## 2017-07-15 RX ADMIN — GABAPENTIN 300 MG: 300 CAPSULE ORAL at 08:05

## 2017-07-15 RX ADMIN — IPRATROPIUM BROMIDE AND ALBUTEROL SULFATE 3 ML: .5; 3 SOLUTION RESPIRATORY (INHALATION) at 08:33

## 2017-07-15 RX ADMIN — INSULIN DETEMIR 15 UNITS: 100 INJECTION, SOLUTION SUBCUTANEOUS at 21:25

## 2017-07-15 RX ADMIN — IPRATROPIUM BROMIDE AND ALBUTEROL SULFATE 3 ML: .5; 3 SOLUTION RESPIRATORY (INHALATION) at 15:25

## 2017-07-15 RX ADMIN — ONDANSETRON 4 MG: 2 INJECTION INTRAMUSCULAR; INTRAVENOUS at 14:44

## 2017-07-15 RX ADMIN — ONDANSETRON 4 MG: 2 INJECTION INTRAMUSCULAR; INTRAVENOUS at 21:46

## 2017-07-15 RX ADMIN — MORPHINE SULFATE 2 MG: 2 INJECTION, SOLUTION INTRAMUSCULAR; INTRAVENOUS at 15:45

## 2017-07-15 RX ADMIN — HYDRALAZINE HYDROCHLORIDE 100 MG: 50 TABLET, FILM COATED ORAL at 17:18

## 2017-07-15 RX ADMIN — LISINOPRIL 40 MG: 40 TABLET ORAL at 08:06

## 2017-07-15 RX ADMIN — OXYCODONE HYDROCHLORIDE AND ACETAMINOPHEN 1 TABLET: 5; 325 TABLET ORAL at 12:09

## 2017-07-15 RX ADMIN — HYDRALAZINE HYDROCHLORIDE 100 MG: 50 TABLET, FILM COATED ORAL at 21:20

## 2017-07-15 RX ADMIN — MORPHINE SULFATE 4 MG: 4 INJECTION, SOLUTION INTRAMUSCULAR; INTRAVENOUS at 02:35

## 2017-07-15 RX ADMIN — AMLODIPINE BESYLATE 10 MG: 10 TABLET ORAL at 08:07

## 2017-07-15 RX ADMIN — OXYCODONE HYDROCHLORIDE AND ACETAMINOPHEN 2 TABLET: 10; 325 TABLET ORAL at 18:39

## 2017-07-15 RX ADMIN — INSULIN ASPART 6 UNITS: 100 INJECTION, SOLUTION INTRAVENOUS; SUBCUTANEOUS at 08:07

## 2017-07-15 RX ADMIN — INSULIN ASPART 2 UNITS: 100 INJECTION, SOLUTION INTRAVENOUS; SUBCUTANEOUS at 17:18

## 2017-07-15 RX ADMIN — INSULIN ASPART 2 UNITS: 100 INJECTION, SOLUTION INTRAVENOUS; SUBCUTANEOUS at 21:21

## 2017-07-15 RX ADMIN — VANCOMYCIN HYDROCHLORIDE 1500 MG: 10 INJECTION, POWDER, LYOPHILIZED, FOR SOLUTION INTRAVENOUS at 14:45

## 2017-07-15 RX ADMIN — FAMOTIDINE 20 MG: 20 TABLET, FILM COATED ORAL at 17:18

## 2017-07-15 RX ADMIN — OXYCODONE HYDROCHLORIDE AND ACETAMINOPHEN 2 TABLET: 10; 325 TABLET ORAL at 00:33

## 2017-07-15 RX ADMIN — TAMSULOSIN HYDROCHLORIDE 0.4 MG: 0.4 CAPSULE ORAL at 08:05

## 2017-07-15 RX ADMIN — ASPIRIN 81 MG: 81 TABLET ORAL at 08:06

## 2017-07-15 RX ADMIN — ATORVASTATIN CALCIUM 40 MG: 20 TABLET, FILM COATED ORAL at 08:05

## 2017-07-15 RX ADMIN — SODIUM CHLORIDE 125 ML/HR: 9 INJECTION, SOLUTION INTRAVENOUS at 00:33

## 2017-07-15 RX ADMIN — FAMOTIDINE 20 MG: 20 TABLET, FILM COATED ORAL at 08:06

## 2017-07-15 RX ADMIN — MORPHINE SULFATE 4 MG: 4 INJECTION, SOLUTION INTRAMUSCULAR; INTRAVENOUS at 12:35

## 2017-07-15 RX ADMIN — SODIUM CHLORIDE 125 ML/HR: 9 INJECTION, SOLUTION INTRAVENOUS at 20:58

## 2017-07-15 RX ADMIN — ENOXAPARIN SODIUM 40 MG: 40 INJECTION SUBCUTANEOUS at 08:08

## 2017-07-15 RX ADMIN — SERTRALINE 100 MG: 100 TABLET, FILM COATED ORAL at 08:06

## 2017-07-15 RX ADMIN — MORPHINE SULFATE 4 MG: 4 INJECTION, SOLUTION INTRAMUSCULAR; INTRAVENOUS at 04:38

## 2017-07-15 RX ADMIN — SODIUM CHLORIDE 125 ML/HR: 9 INJECTION, SOLUTION INTRAVENOUS at 04:38

## 2017-07-15 RX ADMIN — GABAPENTIN 300 MG: 300 CAPSULE ORAL at 21:20

## 2017-07-15 NOTE — PROGRESS NOTES
Leticia Stnaton MD       LOS: 1 day   Patient Care Team:  JOELLE Pak as PCP - General (Family Medicine)  Prosper Oh MD as Consulting Physician (Cardiology)    Subjective     57 y.o. male POD 1 left KA revision for chronic osteo, no   Hb down this am, transfusion in progress    Review of Systems  Review of Systems - co post op pain, paresthesia and external rotation of stump    Objective     Vital Signs  Temp:  [97.5 °F (36.4 °C)-98.6 °F (37 °C)] 97.6 °F (36.4 °C)  Heart Rate:  [57-77] 65  Resp:  [16-20] 19  BP: (110-186)/(50-99) 133/63    Physical Exam  General: Alert and oriented x 4  HEENT: No jugular venous distension, trachea is midline  CV: RRR  Resp: Clear unlabored breathing on ra  Extremities: RLE perfused.  Left AKA dressed, no stump edema or strike through    Results Review:       Recent Results (from the past 12 hour(s))   Basic Metabolic Panel    Collection Time: 07/15/17  4:39 AM   Result Value Ref Range    Glucose 291 (H) 65 - 99 mg/dL    BUN 35 (H) 6 - 20 mg/dL    Creatinine 3.14 (H) 0.76 - 1.27 mg/dL    Sodium 133 (L) 136 - 145 mmol/L    Potassium 4.4 3.5 - 5.2 mmol/L    Chloride 100 98 - 107 mmol/L    CO2 18.4 (L) 22.0 - 29.0 mmol/L    Calcium 7.1 (L) 8.6 - 10.5 mg/dL    eGFR Non African Amer 21 (L) >60 mL/min/1.73    BUN/Creatinine Ratio 11.1 7.0 - 25.0    Anion Gap 14.6 mmol/L   CBC Auto Differential    Collection Time: 07/15/17  4:39 AM   Result Value Ref Range    WBC 11.23 (H) 4.50 - 10.70 10*3/mm3    RBC 2.05 (L) 4.60 - 6.00 10*6/mm3    Hemoglobin 6.5 (C) 13.7 - 17.6 g/dL    Hematocrit 19.5 (C) 40.4 - 52.2 %    MCV 95.1 79.8 - 96.2 fL    MCH 31.7 27.0 - 32.7 pg    MCHC 33.3 32.6 - 36.4 g/dL    RDW 14.8 (H) 11.5 - 14.5 %    RDW-SD 50.7 37.0 - 54.0 fl    MPV 9.2 6.0 - 12.0 fL    Platelets 167 140 - 500 10*3/mm3    Neutrophil % 79.7 (H) 42.7 - 76.0 %    Lymphocyte % 10.9 (L) 19.6 - 45.3 %    Monocyte % 6.5 5.0 - 12.0 %    Eosinophil % 2.3 0.3 - 6.2 %    Basophil % 0.3 0.0 -  1.5 %    Immature Grans % 0.3 0.0 - 0.5 %    Neutrophils, Absolute 8.96 (H) 1.90 - 8.10 10*3/mm3    Lymphocytes, Absolute 1.22 0.90 - 4.80 10*3/mm3    Monocytes, Absolute 0.73 0.20 - 1.20 10*3/mm3    Eosinophils, Absolute 0.26 0.00 - 0.70 10*3/mm3    Basophils, Absolute 0.03 0.00 - 0.20 10*3/mm3    Immature Grans, Absolute 0.03 0.00 - 0.03 10*3/mm3   POC Glucose Fingerstick    Collection Time: 07/15/17  7:28 AM   Result Value Ref Range    Glucose 285 (H) 70 - 130 mg/dL   ]      Assessment/Plan           Principal Problem:    Chronic osteomyelitis of left femur  Active Problems:    Chronic osteomyelitis    ABLA     Assessment & Plan  57 y.o. male POD revision left AKA for chronic osteo  Transfuse  Leave  today for resuscitation  Labs AM      Leticia Stanton MD  07/15/17  8:31 AM

## 2017-07-15 NOTE — PLAN OF CARE
Problem: Patient Care Overview (Adult)  Goal: Plan of Care Review  Outcome: Ongoing (interventions implemented as appropriate)    07/15/17 3612   Coping/Psychosocial Response Interventions   Plan Of Care Reviewed With patient   Patient Care Overview   Progress improving   Outcome Evaluation   Outcome Summary/Follow up Plan VSS, pain controlled with PRN pain medications, recieved three units of PRBC for low hemoglobin, IV antibiotics, resting in bed, will continue to monitor pt.       Goal: Adult Individualization and Mutuality  Outcome: Ongoing (interventions implemented as appropriate)    Problem: Fall Risk (Adult)  Goal: Identify Related Risk Factors and Signs and Symptoms  Outcome: Ongoing (interventions implemented as appropriate)  Goal: Absence of Falls  Outcome: Ongoing (interventions implemented as appropriate)    Problem: Pain, Acute (Adult)  Goal: Identify Related Risk Factors and Signs and Symptoms  Outcome: Ongoing (interventions implemented as appropriate)  Goal: Acceptable Pain Control/Comfort Level  Outcome: Ongoing (interventions implemented as appropriate)
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
## 2017-07-15 NOTE — PROGRESS NOTES
"Pharmacokinetic Consult - Vancomycin Dosing (Follow-up Note)    Jay Blackwood is on day 2 pharmacy to dose vancomycin for left lower extremity stump osteomyelitis.  Consult for Dr. Hawthorne.  Dr. Eugenia Jung followign for ID.  Goal trough: 15-20mg/L     Relevant clinical data and objective history reviewed:  57 y.o. male 74\" (188 cm) 220 lb (99.8 kg)    Creatinine   Date Value Ref Range Status   07/15/2017 3.14 (H) 0.76 - 1.27 mg/dL Final     BUN   Date Value Ref Range Status   07/15/2017 35 (H) 6 - 20 mg/dL Final     Estimated Creatinine Clearance: 32.7 mL/min (by C-G formula based on Cr of 3.14).    Lab Results   Component Value Date    WBC 11.23 (H) 07/15/2017     Temp Readings from Last 3 Encounters:   07/14/17 97.6 °F (36.4 °C) (Oral)       IV Anti-Infectives     Ordered     Dose/Rate Route Frequency Start Stop    07/14/17 1835  vancomycin 1500 mg/500 mL 0.9% NS IVPB ([facility])     Ordering Provider:  Mark Hawthorne MD    15 mg/kg × 99.8 kg Intravenous Every 24 Hours 07/14/17 1915      07/14/17 1816  Pharmacy to dose vancomycin     Ordering Provider:  Mark Hawthorne MD     Does not apply Continuous PRN 07/14/17 1816           Assessment/Plan  Patient started on vancomycin 1500mg (15mg/kg) IV q24h. Dose timing changed to be due at 1200 daily to avoid 2nd/3rd shift administration and levels.  Vancomycin level ordered prior to dose on 7/17.   Pharmacy will continue to follow daily while on vancomycin and adjust as needed.     Xochitl Fitzpatrick, PharmD  7/15/2017  9:03 AM    "

## 2017-07-16 LAB
ABO + RH BLD: NORMAL
ANION GAP SERPL CALCULATED.3IONS-SCNC: 14.1 MMOL/L
ANISOCYTOSIS BLD QL: NORMAL
BASOPHILS # BLD AUTO: 0 10*3/MM3 (ref 0–0.2)
BASOPHILS NFR BLD AUTO: 0 % (ref 0–1.5)
BH BB BLOOD EXPIRATION DATE: NORMAL
BH BB BLOOD TYPE BARCODE: 600
BH BB DISPENSE STATUS: NORMAL
BH BB PRODUCT CODE: NORMAL
BH BB UNIT NUMBER: NORMAL
BUN BLD-MCNC: 33 MG/DL (ref 6–20)
BUN/CREAT SERPL: 9.1 (ref 7–25)
C DIFF TOX GENS STL QL NAA+PROBE: NEGATIVE
CALCIUM SPEC-SCNC: 7.7 MG/DL (ref 8.6–10.5)
CHLORIDE SERPL-SCNC: 109 MMOL/L (ref 98–107)
CO2 SERPL-SCNC: 14.9 MMOL/L (ref 22–29)
CREAT BLD-MCNC: 3.63 MG/DL (ref 0.76–1.27)
DEPRECATED RDW RBC AUTO: 56.1 FL (ref 37–54)
EOSINOPHIL # BLD AUTO: 0.19 10*3/MM3 (ref 0–0.7)
EOSINOPHIL NFR BLD AUTO: 2.5 % (ref 0.3–6.2)
ERYTHROCYTE [DISTWIDTH] IN BLOOD BY AUTOMATED COUNT: 16.6 % (ref 11.5–14.5)
GFR SERPL CREATININE-BSD FRML MDRD: 17 ML/MIN/1.73
GLUCOSE BLD-MCNC: 130 MG/DL (ref 65–99)
GLUCOSE BLDC GLUCOMTR-MCNC: 104 MG/DL (ref 70–130)
GLUCOSE BLDC GLUCOMTR-MCNC: 109 MG/DL (ref 70–130)
GLUCOSE BLDC GLUCOMTR-MCNC: 123 MG/DL (ref 70–130)
GLUCOSE BLDC GLUCOMTR-MCNC: 174 MG/DL (ref 70–130)
HCT VFR BLD AUTO: 27.1 % (ref 40.4–52.2)
HGB BLD-MCNC: 9 G/DL (ref 13.7–17.6)
IMM GRANULOCYTES # BLD: 0.02 10*3/MM3 (ref 0–0.03)
IMM GRANULOCYTES NFR BLD: 0.3 % (ref 0–0.5)
LYMPHOCYTES # BLD AUTO: 0.32 10*3/MM3 (ref 0.9–4.8)
LYMPHOCYTES NFR BLD AUTO: 4.2 % (ref 19.6–45.3)
MAGNESIUM SERPL-MCNC: 1.6 MG/DL (ref 1.6–2.6)
MCH RBC QN AUTO: 30.8 PG (ref 27–32.7)
MCHC RBC AUTO-ENTMCNC: 33.2 G/DL (ref 32.6–36.4)
MCV RBC AUTO: 92.8 FL (ref 79.8–96.2)
MONOCYTES # BLD AUTO: 0.81 10*3/MM3 (ref 0.2–1.2)
MONOCYTES NFR BLD AUTO: 10.6 % (ref 5–12)
NEUTROPHILS # BLD AUTO: 6.28 10*3/MM3 (ref 1.9–8.1)
NEUTROPHILS NFR BLD AUTO: 82.4 % (ref 42.7–76)
PLAT MORPH BLD: NORMAL
PLATELET # BLD AUTO: 152 10*3/MM3 (ref 140–500)
PMV BLD AUTO: 9.1 FL (ref 6–12)
POTASSIUM BLD-SCNC: 4.3 MMOL/L (ref 3.5–5.2)
POTASSIUM BLD-SCNC: 4.4 MMOL/L (ref 3.5–5.2)
RBC # BLD AUTO: 2.92 10*6/MM3 (ref 4.6–6)
SODIUM BLD-SCNC: 138 MMOL/L (ref 136–145)
SPHEROCYTES BLD QL SMEAR: NORMAL
UNIT  ABO: NORMAL
UNIT  RH: NORMAL
VANCOMYCIN SERPL-MCNC: 25 MCG/ML (ref 5–40)
WBC MORPH BLD: NORMAL
WBC NRBC COR # BLD: 7.62 10*3/MM3 (ref 4.5–10.7)

## 2017-07-16 PROCEDURE — 25010000002 MORPHINE SULFATE (PF) 2 MG/ML SOLUTION: Performed by: SURGERY

## 2017-07-16 PROCEDURE — 85025 COMPLETE CBC W/AUTO DIFF WBC: CPT | Performed by: SURGERY

## 2017-07-16 PROCEDURE — 80048 BASIC METABOLIC PNL TOTAL CA: CPT | Performed by: SURGERY

## 2017-07-16 PROCEDURE — 25010000002 HYDROMORPHONE PER 4 MG: Performed by: SURGERY

## 2017-07-16 PROCEDURE — 85007 BL SMEAR W/DIFF WBC COUNT: CPT | Performed by: SURGERY

## 2017-07-16 PROCEDURE — 84484 ASSAY OF TROPONIN QUANT: CPT | Performed by: SURGERY

## 2017-07-16 PROCEDURE — 83735 ASSAY OF MAGNESIUM: CPT | Performed by: SURGERY

## 2017-07-16 PROCEDURE — 63710000001 INSULIN ASPART PER 5 UNITS: Performed by: SURGERY

## 2017-07-16 PROCEDURE — 93010 ELECTROCARDIOGRAM REPORT: CPT | Performed by: INTERNAL MEDICINE

## 2017-07-16 PROCEDURE — 84132 ASSAY OF SERUM POTASSIUM: CPT | Performed by: SURGERY

## 2017-07-16 PROCEDURE — 25010000002 ENOXAPARIN PER 10 MG: Performed by: SURGERY

## 2017-07-16 PROCEDURE — 82962 GLUCOSE BLOOD TEST: CPT

## 2017-07-16 PROCEDURE — 80202 ASSAY OF VANCOMYCIN: CPT | Performed by: INTERNAL MEDICINE

## 2017-07-16 PROCEDURE — 25010000002 ONDANSETRON PER 1 MG: Performed by: SURGERY

## 2017-07-16 PROCEDURE — 87493 C DIFF AMPLIFIED PROBE: CPT | Performed by: INTERNAL MEDICINE

## 2017-07-16 PROCEDURE — 99233 SBSQ HOSP IP/OBS HIGH 50: CPT | Performed by: INTERNAL MEDICINE

## 2017-07-16 PROCEDURE — 93005 ELECTROCARDIOGRAM TRACING: CPT | Performed by: SURGERY

## 2017-07-16 PROCEDURE — 94799 UNLISTED PULMONARY SVC/PX: CPT

## 2017-07-16 PROCEDURE — 87040 BLOOD CULTURE FOR BACTERIA: CPT | Performed by: INTERNAL MEDICINE

## 2017-07-16 RX ORDER — SODIUM BICARBONATE 650 MG/1
1300 TABLET ORAL 2 TIMES DAILY
Status: DISCONTINUED | OUTPATIENT
Start: 2017-07-16 | End: 2017-07-17

## 2017-07-16 RX ORDER — FAMOTIDINE 10 MG/ML
20 INJECTION, SOLUTION INTRAVENOUS DAILY
Status: DISCONTINUED | OUTPATIENT
Start: 2017-07-17 | End: 2017-07-25 | Stop reason: HOSPADM

## 2017-07-16 RX ORDER — DILTIAZEM HYDROCHLORIDE 5 MG/ML
10 INJECTION INTRAVENOUS ONCE
Status: COMPLETED | OUTPATIENT
Start: 2017-07-17 | End: 2017-07-16

## 2017-07-16 RX ORDER — FAMOTIDINE 20 MG/1
20 TABLET, FILM COATED ORAL DAILY
Status: DISCONTINUED | OUTPATIENT
Start: 2017-07-17 | End: 2017-07-25 | Stop reason: HOSPADM

## 2017-07-16 RX ADMIN — OXYCODONE HYDROCHLORIDE AND ACETAMINOPHEN 2 TABLET: 10; 325 TABLET ORAL at 10:48

## 2017-07-16 RX ADMIN — HYDROMORPHONE HYDROCHLORIDE 0.5 MG: 1 INJECTION, SOLUTION INTRAMUSCULAR; INTRAVENOUS; SUBCUTANEOUS at 12:51

## 2017-07-16 RX ADMIN — HYDROMORPHONE HYDROCHLORIDE 0.5 MG: 1 INJECTION, SOLUTION INTRAMUSCULAR; INTRAVENOUS; SUBCUTANEOUS at 18:21

## 2017-07-16 RX ADMIN — MORPHINE SULFATE 2 MG: 2 INJECTION, SOLUTION INTRAMUSCULAR; INTRAVENOUS at 08:30

## 2017-07-16 RX ADMIN — MORPHINE SULFATE 2 MG: 2 INJECTION, SOLUTION INTRAMUSCULAR; INTRAVENOUS at 00:14

## 2017-07-16 RX ADMIN — DILTIAZEM HYDROCHLORIDE 10 MG: 5 INJECTION INTRAVENOUS at 23:33

## 2017-07-16 RX ADMIN — SODIUM BICARBONATE 1300 MG: 650 TABLET ORAL at 17:12

## 2017-07-16 RX ADMIN — DILTIAZEM HYDROCHLORIDE 10 MG/HR: 100 INJECTION, POWDER, LYOPHILIZED, FOR SOLUTION INTRAVENOUS at 23:33

## 2017-07-16 RX ADMIN — AMLODIPINE BESYLATE 10 MG: 10 TABLET ORAL at 08:09

## 2017-07-16 RX ADMIN — INSULIN ASPART 2 UNITS: 100 INJECTION, SOLUTION INTRAVENOUS; SUBCUTANEOUS at 08:10

## 2017-07-16 RX ADMIN — NICOTINE 1 PATCH: 14 PATCH, EXTENDED RELEASE TRANSDERMAL at 21:41

## 2017-07-16 RX ADMIN — INSULIN DETEMIR 15 UNITS: 100 INJECTION, SOLUTION SUBCUTANEOUS at 21:39

## 2017-07-16 RX ADMIN — SODIUM CHLORIDE 125 ML/HR: 9 INJECTION, SOLUTION INTRAVENOUS at 05:15

## 2017-07-16 RX ADMIN — GABAPENTIN 300 MG: 300 CAPSULE ORAL at 08:11

## 2017-07-16 RX ADMIN — SODIUM CHLORIDE 125 ML/HR: 9 INJECTION, SOLUTION INTRAVENOUS at 13:44

## 2017-07-16 RX ADMIN — ATORVASTATIN CALCIUM 40 MG: 20 TABLET, FILM COATED ORAL at 08:09

## 2017-07-16 RX ADMIN — LISINOPRIL 40 MG: 40 TABLET ORAL at 08:09

## 2017-07-16 RX ADMIN — SERTRALINE 100 MG: 100 TABLET, FILM COATED ORAL at 08:09

## 2017-07-16 RX ADMIN — GABAPENTIN 300 MG: 300 CAPSULE ORAL at 21:38

## 2017-07-16 RX ADMIN — ONDANSETRON 4 MG: 2 INJECTION INTRAMUSCULAR; INTRAVENOUS at 10:48

## 2017-07-16 RX ADMIN — HYDROMORPHONE HYDROCHLORIDE 0.5 MG: 1 INJECTION, SOLUTION INTRAMUSCULAR; INTRAVENOUS; SUBCUTANEOUS at 22:08

## 2017-07-16 RX ADMIN — IPRATROPIUM BROMIDE AND ALBUTEROL SULFATE 3 ML: .5; 3 SOLUTION RESPIRATORY (INHALATION) at 16:09

## 2017-07-16 RX ADMIN — ENOXAPARIN SODIUM 40 MG: 40 INJECTION SUBCUTANEOUS at 08:10

## 2017-07-16 RX ADMIN — FAMOTIDINE 20 MG: 20 TABLET, FILM COATED ORAL at 08:09

## 2017-07-16 RX ADMIN — IPRATROPIUM BROMIDE AND ALBUTEROL SULFATE 3 ML: .5; 3 SOLUTION RESPIRATORY (INHALATION) at 06:53

## 2017-07-16 RX ADMIN — ASPIRIN 81 MG: 81 TABLET ORAL at 08:09

## 2017-07-16 RX ADMIN — SODIUM CHLORIDE 125 ML/HR: 9 INJECTION, SOLUTION INTRAVENOUS at 22:03

## 2017-07-16 RX ADMIN — TAMSULOSIN HYDROCHLORIDE 0.4 MG: 0.4 CAPSULE ORAL at 08:09

## 2017-07-16 RX ADMIN — MORPHINE SULFATE 2 MG: 2 INJECTION, SOLUTION INTRAMUSCULAR; INTRAVENOUS at 05:15

## 2017-07-16 NOTE — PLAN OF CARE
Problem: Patient Care Overview (Adult)  Goal: Plan of Care Review  Outcome: Ongoing (interventions implemented as appropriate)    07/16/17 0638   Coping/Psychosocial Response Interventions   Plan Of Care Reviewed With patient   Patient Care Overview   Progress progress toward functional goals as expected   Outcome Evaluation   Outcome Summary/Follow up Plan VSS, pt requested frequent IV pain medication - did not want PO meds. Awaiting CBC results from transfusions. Iv fluids infusing. Will continue to monitor.        Goal: Adult Individualization and Mutuality  Outcome: Ongoing (interventions implemented as appropriate)  Goal: Discharge Needs Assessment  Outcome: Ongoing (interventions implemented as appropriate)    Problem: Fall Risk (Adult)  Goal: Identify Related Risk Factors and Signs and Symptoms  Outcome: Ongoing (interventions implemented as appropriate)    Problem: Pain, Acute (Adult)  Goal: Identify Related Risk Factors and Signs and Symptoms  Outcome: Ongoing (interventions implemented as appropriate)

## 2017-07-16 NOTE — PLAN OF CARE
Problem: Patient Care Overview (Adult)  Goal: Plan of Care Review  Outcome: Ongoing (interventions implemented as appropriate)    07/16/17 1348   Coping/Psychosocial Response Interventions   Plan Of Care Reviewed With patient;spouse   Patient Care Overview   Progress improving   Outcome Evaluation   Outcome Summary/Follow up Plan VSS, pain controlled with PRN pain medication, Pt. had three loose BM tody, notified MD for c-diff order, Hbg 9.0 today, Nephrology dc'd all BP medicines today due to high creatinine, Vanc omycine trough high, holdin Vancomycine as per MD orders, Low grade fever, blood culture sent to lab, f/c out, pt. got up to bedside commode, resting in bed, will continue to monitor pt.        Goal: Adult Individualization and Mutuality  Outcome: Ongoing (interventions implemented as appropriate)    Problem: Fall Risk (Adult)  Goal: Identify Related Risk Factors and Signs and Symptoms  Outcome: Ongoing (interventions implemented as appropriate)  Goal: Absence of Falls  Outcome: Ongoing (interventions implemented as appropriate)    Problem: Pain, Acute (Adult)  Goal: Identify Related Risk Factors and Signs and Symptoms  Outcome: Ongoing (interventions implemented as appropriate)  Goal: Acceptable Pain Control/Comfort Level  Outcome: Ongoing (interventions implemented as appropriate)    Problem: Infection, Risk/Actual (Adult)  Goal: Identify Related Risk Factors and Signs and Symptoms  Outcome: Ongoing (interventions implemented as appropriate)  Goal: Infection Prevention/Resolution  Outcome: Ongoing (interventions implemented as appropriate)

## 2017-07-16 NOTE — PROGRESS NOTES
INFECTIOUS DISEASES PROGRESS NOTE    CC: f/u MRSA stump infection    S:   C/o constant pain in L stump since surgery, burning, nonradiating, worse with movement and better with analgesia    ROS:   No f/c/ns  Two episodes of n/v yesterday--no diarrhea.    O:  Physical Exam:  Temp:  [97.6 °F (36.4 °C)-99.4 °F (37.4 °C)] 98.3 °F (36.8 °C)  Heart Rate:  [64-93] 93  Resp:  [16-20] 20  BP: (100-152)/(51-64) 119/62  Physical Exam   Constitutional: He appears well-developed. No distress.   Abdominal: Soft.   Musculoskeletal: He exhibits deformity (L AKA stump dressed).   Neurological: He is alert.   Skin: Skin is warm and dry.   Psychiatric: He has a normal mood and affect. His behavior is normal.      Diagnostics:    Cr 3.63  WBC 7.6 (P82, L 4, M 11, E3)  H/H 9/27           Assessment/Plan   1. Left stump osteomyelitis with abscess. Wound cultures with MRSA  - With KRISTA will spot dose IV vancomycin.  Repeat level this afternoon around 1400 and decide on new dose then  - Tentative stop date was August 14, 2017  -Goal trough is 15-20. Repeat level Monday     2. Type 2 diabetes complicating above  -Continue glycemic control efforts to prevent/control infectious complications     3. Peripheral vascular disease complicating above     4. Acute on CKD III, worse  - Creatinine still going up  - D/w Dr. Stanton and I am going to ask renal to evaluate  - vancomycin as above        Felipe Foster MD  8:03 AM  07/16/17

## 2017-07-16 NOTE — PROGRESS NOTES
"Pharmacokinetic Evaluation - Vancomycin     Day: 3  Indication: MRSA + LLE stump osteomyelitis; seen by Dr. Foster for ID  Doctor: Dr. Hawthorne  Goal trough: 15-20    Current dose: 1500 mg Q24   Other antimicrobials: none    Blood pressure 119/62, pulse 93, temperature 98.3 °F (36.8 °C), temperature source Oral, resp. rate 20, height 74\" (188 cm), weight 216 lb 3.2 oz (98.1 kg), SpO2 93 %.    Results from last 7 days  Lab Units 07/16/17  0518 07/15/17  0439   CREATININE mg/dL 3.63* 3.14*     Estimated Creatinine Clearance: 31.2 mL/min (by C-G formula based on Cr of 3.63).    Results from last 7 days  Lab Units 07/16/17 0518 07/15/17  0439   WBC 10*3/mm3 7.62 11.23*   HEMOGLOBIN g/dL 9.0* 6.5*   HEMATOCRIT % 27.1* 19.5*   PLATELETS 10*3/mm3 152 167     Other relevant labs/chart info: Seen in ID clinic outpt; at the time of note on 7/3 had planned for 6 week therapy based on dates of sx; surgical revision completed on 7/14 - tentative stop date 8/14    Radiology: 5/31 MRI with evidence of osteomyelitis per ID clinic note on 7/3   Cx: Wound cx + for MRSA    Dosing hx:  Pt initiated on vanc outpt on 7/6 - at the time was on 1250 q24; home med rec notes 750 daily   7/14: 1500 mg q24 started - given at 2016  7/15: timing changed to 1400 - given at 1445    Assessment:  Given increase in SCr from 3.14 to 3.63 agree to hold vancomycin and initiate pulse dosing - level to be drawn today at 1400 (~24 hr after last dose)    Plan:  1) Change patient to pulse dose vancomycin; level today at 1400.   2) Anticipate need to decrease dosing to around 750 q24 dependent upon level. Will follow and adjust to maintain trough of 15-20    Mercy Maravilla.D., [facility]  Clinical Pharmacist  [phone]    Addendum 7/16 14:42    Random vancomycin level returned at 25 mg/dL - will hold dose further and plan for random level with AM labs. Will re-initiate dosing once level <20 mg/dL.  "

## 2017-07-16 NOTE — NURSING NOTE
Patient had two unmeasured occurrence of urine with bowel movement. Bladder scanned at 1818. Bladder scan [id number].

## 2017-07-16 NOTE — PROGRESS NOTES
Leticia Stanton MD       LOS: 2 days   Patient Care Team:  JOELLE Pak as PCP - General (Family Medicine)  Prosper Oh MD as Consulting Physician (Cardiology)    Subjective     57 y.o. male pod 2 left AKA revision for osteo  Emesis with morphine  No nausea  No abd pain  Hb up to 9  He has been up to commode    Cr up, vanc on hold      Review of Systems  Review of Systems - co sore stump    Objective     Vital Signs  Temp:  [97.6 °F (36.4 °C)-99.4 °F (37.4 °C)] 98.3 °F (36.8 °C)  Heart Rate:  [64-93] 93  Resp:  [16-20] 20  BP: (108-152)/(51-64) 119/62    Physical Exam  General: No acute distress. Alert and oriented x 4  HEENT: No jugular venous distension, trachea is midline  CV: RRR  Resp: Clear unlabored breathing on ra  Abd: Abdomen is soft, nontender, nondistended  Extremities: stump dressed, no edema    Results Review:       Recent Results (from the past 12 hour(s))   Basic Metabolic Panel    Collection Time: 07/16/17  5:18 AM   Result Value Ref Range    Glucose 130 (H) 65 - 99 mg/dL    BUN 33 (H) 6 - 20 mg/dL    Creatinine 3.63 (H) 0.76 - 1.27 mg/dL    Sodium 138 136 - 145 mmol/L    Potassium 4.3 3.5 - 5.2 mmol/L    Chloride 109 (H) 98 - 107 mmol/L    CO2 14.9 (L) 22.0 - 29.0 mmol/L    Calcium 7.7 (L) 8.6 - 10.5 mg/dL    eGFR Non African Amer 17 (L) >60 mL/min/1.73    BUN/Creatinine Ratio 9.1 7.0 - 25.0    Anion Gap 14.1 mmol/L   CBC Auto Differential    Collection Time: 07/16/17  5:18 AM   Result Value Ref Range    WBC 7.62 4.50 - 10.70 10*3/mm3    RBC 2.92 (L) 4.60 - 6.00 10*6/mm3    Hemoglobin 9.0 (L) 13.7 - 17.6 g/dL    Hematocrit 27.1 (L) 40.4 - 52.2 %    MCV 92.8 79.8 - 96.2 fL    MCH 30.8 27.0 - 32.7 pg    MCHC 33.2 32.6 - 36.4 g/dL    RDW 16.6 (H) 11.5 - 14.5 %    RDW-SD 56.1 (H) 37.0 - 54.0 fl    MPV 9.1 6.0 - 12.0 fL    Platelets 152 140 - 500 10*3/mm3    Neutrophil % 82.4 (H) 42.7 - 76.0 %    Lymphocyte % 4.2 (L) 19.6 - 45.3 %    Monocyte % 10.6 5.0 - 12.0 %    Eosinophil % 2.5  0.3 - 6.2 %    Basophil % 0.0 0.0 - 1.5 %    Immature Grans % 0.3 0.0 - 0.5 %    Neutrophils, Absolute 6.28 1.90 - 8.10 10*3/mm3    Lymphocytes, Absolute 0.32 (L) 0.90 - 4.80 10*3/mm3    Monocytes, Absolute 0.81 0.20 - 1.20 10*3/mm3    Eosinophils, Absolute 0.19 0.00 - 0.70 10*3/mm3    Basophils, Absolute 0.00 0.00 - 0.20 10*3/mm3    Immature Grans, Absolute 0.02 0.00 - 0.03 10*3/mm3   Scan Slide    Collection Time: 07/16/17  5:18 AM   Result Value Ref Range    Anisocytosis Mod/2+ None Seen    Spherocytes Slight/1+ None Seen    WBC Morphology Normal Normal    Platelet Morphology Normal Normal   Prepare RBC, 3 Units    Collection Time: 07/16/17  6:00 AM   Result Value Ref Range    Product Code S1888U28     Unit Number [id number]     UNIT  ABO A     UNIT  RH NEG     Dispense Status PT     Blood Type ANEG     Blood Expiration Date [id number]     Blood Type Barcode 0600     Product Code Y6802F60     Unit Number [id number]     UNIT  ABO A     UNIT  RH NEG     Dispense Status PT     Blood Type ANEG     Blood Expiration Date 841273106706     Blood Type Barcode 0600     Product Code X9186S99     Unit Number [id number]     UNIT  ABO A     UNIT  RH NEG     Dispense Status PT     Blood Type ANEG     Blood Expiration Date [id number]     Blood Type Barcode 0600    POC Glucose Fingerstick    Collection Time: 07/16/17  7:39 AM   Result Value Ref Range    Glucose 174 (H) 70 - 130 mg/dL   ]      Assessment/Plan           Principal Problem:    Chronic osteomyelitis of left femur  Active Problems:    Chronic osteomyelitis      Assessment & Plan  57 y.o. male pod 2 left aka revision  Morphine to dilaudid  DC segal, do need to follow output  Renal to see, fluids per renal      Leticia Stanton MD  07/16/17  9:03 AM

## 2017-07-17 LAB
25(OH)D3 SERPL-MCNC: <5 NG/ML (ref 30–100)
ALBUMIN SERPL-MCNC: 2.5 G/DL (ref 3.5–5.2)
AMORPH URATE CRY URNS QL MICRO: ABNORMAL /HPF
ANION GAP SERPL CALCULATED.3IONS-SCNC: 12.7 MMOL/L
BACTERIA UR QL AUTO: ABNORMAL /HPF
BASOPHILS # BLD AUTO: 0.01 10*3/MM3 (ref 0–0.2)
BASOPHILS NFR BLD AUTO: 0.1 % (ref 0–1.5)
BILIRUB UR QL STRIP: NEGATIVE
BUN BLD-MCNC: 41 MG/DL (ref 6–20)
BUN/CREAT SERPL: 9.9 (ref 7–25)
CALCIUM SPEC-SCNC: 7.7 MG/DL (ref 8.6–10.5)
CHLORIDE SERPL-SCNC: 111 MMOL/L (ref 98–107)
CLARITY UR: ABNORMAL
CO2 SERPL-SCNC: 13.3 MMOL/L (ref 22–29)
COLOR UR: YELLOW
CREAT BLD-MCNC: 4.14 MG/DL (ref 0.76–1.27)
CREAT UR-MCNC: 90.7 MG/DL
DEPRECATED RDW RBC AUTO: 57.2 FL (ref 37–54)
EOSINOPHIL # BLD AUTO: 0.16 10*3/MM3 (ref 0–0.7)
EOSINOPHIL NFR BLD AUTO: 1.8 % (ref 0.3–6.2)
ERYTHROCYTE [DISTWIDTH] IN BLOOD BY AUTOMATED COUNT: 16.3 % (ref 11.5–14.5)
FERRITIN SERPL-MCNC: 693.8 NG/ML (ref 30–400)
GFR SERPL CREATININE-BSD FRML MDRD: 15 ML/MIN/1.73
GLUCOSE BLD-MCNC: 95 MG/DL (ref 65–99)
GLUCOSE BLDC GLUCOMTR-MCNC: 112 MG/DL (ref 70–130)
GLUCOSE BLDC GLUCOMTR-MCNC: 117 MG/DL (ref 70–130)
GLUCOSE BLDC GLUCOMTR-MCNC: 129 MG/DL (ref 70–130)
GLUCOSE BLDC GLUCOMTR-MCNC: 81 MG/DL (ref 70–130)
GLUCOSE UR STRIP-MCNC: NEGATIVE MG/DL
HCT VFR BLD AUTO: 25.1 % (ref 40.4–52.2)
HGB BLD-MCNC: 8.2 G/DL (ref 13.7–17.6)
HGB UR QL STRIP.AUTO: ABNORMAL
HYALINE CASTS UR QL AUTO: ABNORMAL /LPF
IMM GRANULOCYTES # BLD: 0 10*3/MM3 (ref 0–0.03)
IMM GRANULOCYTES NFR BLD: 0 % (ref 0–0.5)
IRON 24H UR-MRATE: 10 MCG/DL (ref 59–158)
IRON SATN MFR SERPL: 7 % (ref 20–50)
KETONES UR QL STRIP: NEGATIVE
LAB AP CASE REPORT: NORMAL
LEUKOCYTE ESTERASE UR QL STRIP.AUTO: NEGATIVE
LYMPHOCYTES # BLD AUTO: 0.67 10*3/MM3 (ref 0.9–4.8)
LYMPHOCYTES NFR BLD AUTO: 7.6 % (ref 19.6–45.3)
Lab: NORMAL
MAGNESIUM SERPL-MCNC: 1.7 MG/DL (ref 1.6–2.6)
MCH RBC QN AUTO: 31.5 PG (ref 27–32.7)
MCHC RBC AUTO-ENTMCNC: 32.7 G/DL (ref 32.6–36.4)
MCV RBC AUTO: 96.5 FL (ref 79.8–96.2)
MONOCYTES # BLD AUTO: 1.45 10*3/MM3 (ref 0.2–1.2)
MONOCYTES NFR BLD AUTO: 16.4 % (ref 5–12)
NEUTROPHILS # BLD AUTO: 6.56 10*3/MM3 (ref 1.9–8.1)
NEUTROPHILS NFR BLD AUTO: 74.1 % (ref 42.7–76)
NITRITE UR QL STRIP: NEGATIVE
PATH REPORT.FINAL DX SPEC: NORMAL
PATH REPORT.GROSS SPEC: NORMAL
PH UR STRIP.AUTO: <=5 [PH] (ref 5–8)
PHOSPHATE SERPL-MCNC: 4.9 MG/DL (ref 2.5–4.5)
PLATELET # BLD AUTO: 133 10*3/MM3 (ref 140–500)
PMV BLD AUTO: 9.4 FL (ref 6–12)
POTASSIUM BLD-SCNC: 4.4 MMOL/L (ref 3.5–5.2)
PROT UR QL STRIP: ABNORMAL
RBC # BLD AUTO: 2.6 10*6/MM3 (ref 4.6–6)
RBC # UR: ABNORMAL /HPF
REF LAB TEST METHOD: ABNORMAL
SODIUM BLD-SCNC: 137 MMOL/L (ref 136–145)
SODIUM UR-SCNC: 42 MMOL/L
SP GR UR STRIP: 1.02 (ref 1–1.03)
SQUAMOUS #/AREA URNS HPF: ABNORMAL /HPF
TIBC SERPL-MCNC: 148 MCG/DL (ref 298–536)
TRANSFERRIN SERPL-MCNC: 99 MG/DL (ref 200–360)
TROPONIN T SERPL-MCNC: 0.05 NG/ML (ref 0–0.03)
UROBILINOGEN UR QL STRIP: ABNORMAL
VANCOMYCIN TROUGH SERPL-MCNC: 23.3 MCG/ML (ref 5–20)
WBC NRBC COR # BLD: 8.85 10*3/MM3 (ref 4.5–10.7)
WBC UR QL AUTO: ABNORMAL /HPF

## 2017-07-17 PROCEDURE — 83540 ASSAY OF IRON: CPT | Performed by: INTERNAL MEDICINE

## 2017-07-17 PROCEDURE — 99232 SBSQ HOSP IP/OBS MODERATE 35: CPT | Performed by: INTERNAL MEDICINE

## 2017-07-17 PROCEDURE — 81001 URINALYSIS AUTO W/SCOPE: CPT | Performed by: INTERNAL MEDICINE

## 2017-07-17 PROCEDURE — 99222 1ST HOSP IP/OBS MODERATE 55: CPT | Performed by: INTERNAL MEDICINE

## 2017-07-17 PROCEDURE — 83735 ASSAY OF MAGNESIUM: CPT | Performed by: INTERNAL MEDICINE

## 2017-07-17 PROCEDURE — 84300 ASSAY OF URINE SODIUM: CPT | Performed by: INTERNAL MEDICINE

## 2017-07-17 PROCEDURE — 82570 ASSAY OF URINE CREATININE: CPT | Performed by: INTERNAL MEDICINE

## 2017-07-17 PROCEDURE — 25010000002 HYDROMORPHONE PER 4 MG: Performed by: SURGERY

## 2017-07-17 PROCEDURE — 82962 GLUCOSE BLOOD TEST: CPT

## 2017-07-17 PROCEDURE — 80202 ASSAY OF VANCOMYCIN: CPT | Performed by: INTERNAL MEDICINE

## 2017-07-17 PROCEDURE — 84466 ASSAY OF TRANSFERRIN: CPT | Performed by: INTERNAL MEDICINE

## 2017-07-17 PROCEDURE — 82728 ASSAY OF FERRITIN: CPT | Performed by: INTERNAL MEDICINE

## 2017-07-17 PROCEDURE — 82306 VITAMIN D 25 HYDROXY: CPT | Performed by: INTERNAL MEDICINE

## 2017-07-17 PROCEDURE — 97110 THERAPEUTIC EXERCISES: CPT

## 2017-07-17 PROCEDURE — 85025 COMPLETE CBC W/AUTO DIFF WBC: CPT | Performed by: SURGERY

## 2017-07-17 PROCEDURE — 25010000002 ENOXAPARIN PER 10 MG: Performed by: SURGERY

## 2017-07-17 PROCEDURE — 94799 UNLISTED PULMONARY SVC/PX: CPT

## 2017-07-17 PROCEDURE — 97162 PT EVAL MOD COMPLEX 30 MIN: CPT

## 2017-07-17 PROCEDURE — 80069 RENAL FUNCTION PANEL: CPT | Performed by: INTERNAL MEDICINE

## 2017-07-17 RX ORDER — IPRATROPIUM BROMIDE AND ALBUTEROL SULFATE 2.5; .5 MG/3ML; MG/3ML
3 SOLUTION RESPIRATORY (INHALATION)
Status: COMPLETED | OUTPATIENT
Start: 2017-07-17 | End: 2017-07-18

## 2017-07-17 RX ORDER — SODIUM BICARBONATE 650 MG/1
1300 TABLET ORAL 3 TIMES DAILY
Status: DISCONTINUED | OUTPATIENT
Start: 2017-07-17 | End: 2017-07-19

## 2017-07-17 RX ORDER — CARVEDILOL 3.12 MG/1
3.12 TABLET ORAL EVERY 12 HOURS SCHEDULED
Status: DISCONTINUED | OUTPATIENT
Start: 2017-07-17 | End: 2017-07-25 | Stop reason: HOSPADM

## 2017-07-17 RX ADMIN — TAMSULOSIN HYDROCHLORIDE 0.4 MG: 0.4 CAPSULE ORAL at 08:47

## 2017-07-17 RX ADMIN — SODIUM BICARBONATE 1300 MG: 650 TABLET ORAL at 18:33

## 2017-07-17 RX ADMIN — CARVEDILOL 3.12 MG: 3.12 TABLET, FILM COATED ORAL at 09:50

## 2017-07-17 RX ADMIN — ASPIRIN 81 MG: 81 TABLET ORAL at 08:47

## 2017-07-17 RX ADMIN — SODIUM CHLORIDE 125 ML/HR: 9 INJECTION, SOLUTION INTRAVENOUS at 07:00

## 2017-07-17 RX ADMIN — IPRATROPIUM BROMIDE AND ALBUTEROL SULFATE 3 ML: .5; 3 SOLUTION RESPIRATORY (INHALATION) at 11:10

## 2017-07-17 RX ADMIN — OXYCODONE HYDROCHLORIDE AND ACETAMINOPHEN 2 TABLET: 10; 325 TABLET ORAL at 18:33

## 2017-07-17 RX ADMIN — ATORVASTATIN CALCIUM 40 MG: 20 TABLET, FILM COATED ORAL at 08:46

## 2017-07-17 RX ADMIN — CARVEDILOL 3.12 MG: 3.12 TABLET, FILM COATED ORAL at 20:47

## 2017-07-17 RX ADMIN — SERTRALINE 100 MG: 100 TABLET, FILM COATED ORAL at 08:47

## 2017-07-17 RX ADMIN — SODIUM BICARBONATE 1300 MG: 650 TABLET ORAL at 21:27

## 2017-07-17 RX ADMIN — ENOXAPARIN SODIUM 30 MG: 30 INJECTION SUBCUTANEOUS at 08:47

## 2017-07-17 RX ADMIN — HYDROMORPHONE HYDROCHLORIDE 0.5 MG: 1 INJECTION, SOLUTION INTRAMUSCULAR; INTRAVENOUS; SUBCUTANEOUS at 14:56

## 2017-07-17 RX ADMIN — NICOTINE 1 PATCH: 14 PATCH, EXTENDED RELEASE TRANSDERMAL at 21:25

## 2017-07-17 RX ADMIN — GABAPENTIN 300 MG: 300 CAPSULE ORAL at 20:47

## 2017-07-17 RX ADMIN — DILTIAZEM HYDROCHLORIDE 10 MG/HR: 100 INJECTION, POWDER, LYOPHILIZED, FOR SOLUTION INTRAVENOUS at 07:50

## 2017-07-17 RX ADMIN — SODIUM BICARBONATE 1300 MG: 650 TABLET ORAL at 08:47

## 2017-07-17 RX ADMIN — OXYCODONE HYDROCHLORIDE AND ACETAMINOPHEN 1 TABLET: 5; 325 TABLET ORAL at 03:46

## 2017-07-17 RX ADMIN — GABAPENTIN 300 MG: 300 CAPSULE ORAL at 08:47

## 2017-07-17 RX ADMIN — IPRATROPIUM BROMIDE AND ALBUTEROL SULFATE 3 ML: .5; 3 SOLUTION RESPIRATORY (INHALATION) at 15:06

## 2017-07-17 RX ADMIN — OXYCODONE HYDROCHLORIDE AND ACETAMINOPHEN 2 TABLET: 10; 325 TABLET ORAL at 11:56

## 2017-07-17 RX ADMIN — IPRATROPIUM BROMIDE AND ALBUTEROL SULFATE 3 ML: .5; 3 SOLUTION RESPIRATORY (INHALATION) at 23:10

## 2017-07-17 RX ADMIN — FAMOTIDINE 20 MG: 20 TABLET, FILM COATED ORAL at 08:47

## 2017-07-17 RX ADMIN — HYDROMORPHONE HYDROCHLORIDE 0.5 MG: 1 INJECTION, SOLUTION INTRAMUSCULAR; INTRAVENOUS; SUBCUTANEOUS at 08:47

## 2017-07-17 RX ADMIN — INSULIN DETEMIR 15 UNITS: 100 INJECTION, SOLUTION SUBCUTANEOUS at 21:24

## 2017-07-17 NOTE — PROGRESS NOTES
INFECTIOUS DISEASES PROGRESS NOTE    CC: f/u MRSA stump infection    S: Afebrile, continues to report severe left stump pain.  Denies any abdominal pain nausea vomiting or diarrhea.  Tolerating antibiotics without a rash.  No shortness of breath or cough.    O:  Physical Exam:  Temp:  [97.9 °F (36.6 °C)-100.2 °F (37.9 °C)] 100.2 °F (37.9 °C)  Heart Rate:  [] 76  Resp:  [12-22] 20  BP: ()/() 142/56  Physical Exam   No apparent distress  Regular rate and rhythm no lower extremity edema   Soft nontender nondistended  Left stump covered in dressing clean dry and  No rashes    Diagnostics:    Lab Results   Component Value Date    WBC 8.85 07/17/2017    HGB 8.2 (L) 07/17/2017    HCT 25.1 (L) 07/17/2017    MCV 96.5 (H) 07/17/2017     (L) 07/17/2017     Lab Results   Component Value Date    GLUCOSE 95 07/17/2017    BUN 41 (H) 07/17/2017    CREATININE 4.14 (H) 07/17/2017    EGFRIFNONA 15 (L) 07/17/2017    EGFRIFAFRI  09/15/2016      Comment:      <15 Indicative of kidney failure.    BCR 9.9 07/17/2017    K 4.4 07/17/2017    CO2 13.3 (L) 07/17/2017    CALCIUM 7.7 (L) 07/17/2017    ALBUMIN 2.50 (L) 07/17/2017    LABIL2 0.9 07/06/2017    AST 11 07/06/2017    ALT 12 07/06/2017     Microbiology:  7/16 C diff negative  7/16 BCx NGTDx2  6/16 Wound cx MRSA    Assessment/Plan   1. Left stump osteomyelitis with abscess status post revision of the left above-the-knee amputation on 7/14. Prior wound cultures with MRSA  - continue vancomycin dosing per pharmacy  - Abx stop date was August 25, 2017     2. Type 2 diabetes complicating above     3. Peripheral vascular disease complicating above     4. Acute on CKD III, worse  - Nephrology following        Eugenia Jung MD  8:03 AM  07/17/17

## 2017-07-17 NOTE — PROGRESS NOTES
"   LOS: 3 days    Patient Care Team:  JOELLE Pak as PCP - General (Family Medicine)  Prosper Oh MD as Consulting Physician (Cardiology)    Chief Complaint:  No chief complaint on file.      Subjective follow-up acute kidney injury on chronic kidney disease    Interval History:   Patient is complaining of severe left the stump pain.  He denies any difficulty voiding.  His bladder scan after I examined him was 310 cc with voiding about 30 minutes earlier.  Denies any chest pain or shortness of air, no orthopnea or PND, no nausea or vomiting.      Review of Systems:   As noted above    Objective     Vital Signs  Temp:  [97.9 °F (36.6 °C)-100 °F (37.8 °C)] 99.9 °F (37.7 °C)  Heart Rate:  [] 77  Resp:  [12-22] 20  BP: ()/() 141/56    Flowsheet Rows         First Filed Value    Admission Height  74\" (188 cm) Documented at 07/14/2017 1002    Admission Weight  220 lb (99.8 kg) Documented at 07/14/2017 1002             I/O last 3 completed shifts:  In: 7142 [P.O.:1800; I.V.:5342]  Out: 1975 [Urine:1675; Emesis/NG output:300]    Intake/Output Summary (Last 24 hours) at 07/17/17 1018  Last data filed at 07/17/17 0659   Gross per 24 hour   Intake             4280 ml   Output             1150 ml   Net             3130 ml       Physical Exam:  General Appearance: alert, oriented x 3, in mild distress, morbidly obese,   Skin: warm and dry  Neck: supple, no JVD, trachea midline  Lungs: Bilateral rhonchi, unlabored breathing effort  Heart: RRR, normal S1 and S2, no S3, no rub  Abdomen: soft, non-tender, no palpable bladder, present bowel sounds to auscultation  Extremities: 1+ edema of the right leg, many well-healed scars, he had left AKA stump is wrapped  Neuro: normal speech and mental status      Results Review:      Results from last 7 days  Lab Units 07/17/17  0458 07/16/17  2333 07/16/17  0518 07/15/17  0439   SODIUM mmol/L 137  --  138 133*   POTASSIUM mmol/L 4.4 4.4 4.3 4.4   CHLORIDE " mmol/L 111*  --  109* 100   CO2 mmol/L 13.3*  --  14.9* 18.4*   BUN mg/dL 41*  --  33* 35*   CREATININE mg/dL 4.14*  --  3.63* 3.14*   CALCIUM mg/dL 7.7*  --  7.7* 7.1*   GLUCOSE mg/dL 95  --  130* 291*       Estimated Creatinine Clearance: 27.3 mL/min (by C-G formula based on Cr of 4.14).      Results from last 7 days  Lab Units 07/17/17  0458 07/16/17  2333   MAGNESIUM mg/dL 1.7 1.6   PHOSPHORUS mg/dL 4.9*  --                Results from last 7 days  Lab Units 07/17/17  0458 07/16/17  0518 07/15/17  0439   WBC 10*3/mm3 8.85 7.62 11.23*   HEMOGLOBIN g/dL 8.2* 9.0* 6.5*   PLATELETS 10*3/mm3 133* 152 167               Imaging Results (last 24 hours)     ** No results found for the last 24 hours. **          aspirin 81 mg Oral Daily   atorvastatin 40 mg Oral Daily   carvedilol 3.125 mg Oral Q12H   enoxaparin 30 mg Subcutaneous Daily   famotidine 20 mg Intravenous Daily   Or      famotidine 20 mg Oral Daily   gabapentin 300 mg Oral Q12H   insulin aspart 0-9 Units Subcutaneous 4x Daily With Meals & Nightly   insulin detemir 15 Units Subcutaneous Nightly   ipratropium-albuterol 3 mL Nebulization Q8H - RT   nicotine 1 patch Transdermal Q24H   sertraline 100 mg Oral Daily   sodium bicarbonate 1,300 mg Oral BID   tamsulosin 0.4 mg Oral Daily   Vancomycin Pharmacy Intermittent Dosing  Does not apply Daily       diltiaZEM 5-15 mg/hr Last Rate: 10 mg/hr (07/17/17 0750)   Pharmacy to dose vancomycin     sodium chloride 125 mL/hr Last Rate: 125 mL/hr (07/17/17 0700)       Medication Review:   Current Facility-Administered Medications   Medication Dose Route Frequency Provider Last Rate Last Dose   • aspirin EC tablet 81 mg  81 mg Oral Daily Mark Hawthorne MD   81 mg at 07/17/17 0847   • atorvastatin (LIPITOR) tablet 40 mg  40 mg Oral Daily Mark Hawthorne MD   40 mg at 07/17/17 0846   • carvedilol (COREG) tablet 3.125 mg  3.125 mg Oral Q12H Romeo Agee MD   3.125 mg at 07/17/17 0950   • dextrose (D50W)  solution 25 g  25 g Intravenous Q15 Min PRN Mark Hawthorne MD       • dextrose (GLUTOSE) oral gel 15 g  15 g Oral Q15 Min PRN Mark Hawthorne MD       • diltiaZEM (CARDIZEM) 100 mg/100 mL (1 mg/mL) 0.9% NS  5-15 mg/hr Intravenous Titrated Dutch Romo MD 10 mL/hr at 07/17/17 0750 10 mg/hr at 07/17/17 0750   • enoxaparin (LOVENOX) syringe 30 mg  30 mg Subcutaneous Daily Mark Hawthorne MD   30 mg at 07/17/17 0847   • famotidine (PEPCID) injection 20 mg  20 mg Intravenous Daily Mark Hawthorne MD        Or   • famotidine (PEPCID) tablet 20 mg  20 mg Oral Daily Mark Hawthorne MD   20 mg at 07/17/17 0847   • gabapentin (NEURONTIN) capsule 300 mg  300 mg Oral Q12H Mark Hawthorne MD   300 mg at 07/17/17 0847   • glucagon (human recombinant) (GLUCAGEN DIAGNOSTIC) injection 1 mg  1 mg Subcutaneous Q15 Min PRN Mark Hawthorne MD       • HYDROmorphone (DILAUDID) injection 0.5 mg  0.5 mg Intravenous Q2H PRN Leticia Stanton MD   0.5 mg at 07/17/17 0847   • insulin aspart (novoLOG) injection 0-9 Units  0-9 Units Subcutaneous 4x Daily With Meals & Nightly Mark Hawthorne MD   2 Units at 07/16/17 0810   • insulin detemir (LEVEMIR) injection 15 Units  15 Units Subcutaneous Nightly Mark Hawthorne MD   15 Units at 07/16/17 2139   • ipratropium-albuterol (DUO-NEB) nebulizer solution 3 mL  3 mL Nebulization Q8H - RT Mark Hawthorne MD       • morphine injection 4 mg  4 mg Intravenous Q2H PRN Leticia Stanton MD   4 mg at 07/15/17 1235   • naloxone (NARCAN) injection 0.4 mg  0.4 mg Intravenous Q5 Min PRN Leticia Stanton MD       • nicotine (NICODERM CQ) 14 MG/24HR patch 1 patch  1 patch Transdermal Q24H Leticia Stanton MD   1 patch at 07/16/17 2895   • nitroglycerin (NITROSTAT) SL tablet 0.4 mg  0.4 mg Sublingual Q5 Min PRN Mark Hawthorne MD       • ondansetron (ZOFRAN) tablet 4 mg  4 mg Oral Q6H PRN Mark Hawthorne MD        Or   •  ondansetron ODT (ZOFRAN-ODT) disintegrating tablet 4 mg  4 mg Oral Q6H PRN Mark Hawthorne MD        Or   • ondansetron (ZOFRAN) injection 4 mg  4 mg Intravenous Q6H PRN Mark Hawthorne MD   4 mg at 07/16/17 1048   • oxyCODONE-acetaminophen (PERCOCET)  MG per tablet 2 tablet  2 tablet Oral Q6H PRN Mark Hawthorne MD   2 tablet at 07/16/17 1048   • oxyCODONE-acetaminophen (PERCOCET) 5-325 MG per tablet 1 tablet  1 tablet Oral Q4H PRN Mark Hawthorne MD   1 tablet at 07/17/17 0346   • Pharmacy to dose vancomycin   Does not apply Continuous PRN Mark Hawthorne MD       • sennosides-docusate sodium (SENOKOT-S) 8.6-50 MG tablet 2 tablet  2 tablet Oral BID PRN Mark Hawthorne MD       • sertraline (ZOLOFT) tablet 100 mg  100 mg Oral Daily Mark Hawthorne MD   100 mg at 07/17/17 0847   • sodium bicarbonate tablet 1,300 mg  1,300 mg Oral BID Chris Kelly MD   1,300 mg at 07/17/17 0847   • sodium chloride 0.9 % infusion  125 mL/hr Intravenous Continuous Mark Hawthorne  mL/hr at 07/17/17 0700 125 mL/hr at 07/17/17 0700   • tamsulosin (FLOMAX) 24 hr capsule 0.4 mg  0.4 mg Oral Daily Mark Hawthorne MD   0.4 mg at 07/17/17 0847   • Vancomycin Pharmacy Intermittent Dosing   Does not apply Daily Mark Hawthorne MD           Assessment/Plan   1.  Acute kidney injury on chronic kidney disease, the he has questionable urinary retention, he had the previous fistula surgery with hypotension related to poor oral intake also sepsis syndrome and compromise or total regulation from the ACE inhibitor.  Creatinine is up to 4.14, no uremic symptoms  2.  Urinary retention  3.  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  4.  ABL A  5.  Peripheral vascular disease and carotid disease  6.  Hypotension resolved  7.  Metabolic acidosis      Plan:  1.  We'll place a Garcia catheter to bedside drainage  2. Continue the oral sodium bicarbonate but will increase it to 3  times  3.  Surveillance labs          Hiro Roberson MD  07/17/17  10:18 AM
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
## 2017-07-17 NOTE — PLAN OF CARE
Problem: Patient Care Overview (Adult)  Goal: Plan of Care Review  Outcome: Ongoing (interventions implemented as appropriate)    07/17/17 1812   Coping/Psychosocial Response Interventions   Plan Of Care Reviewed With patient   Patient Care Overview   Progress improving   Outcome Evaluation   Outcome Summary/Follow up Plan pt resting well today. worked with pt. pain controlled. continue to increase mobility. dsg changes q shift. betadine and dry dressing. vanc trough in am and pharmacy to continue to dose vanc. pt weaned off cardizem. continue to monitor       Goal: Discharge Needs Assessment  Outcome: Ongoing (interventions implemented as appropriate)    07/17/17 1812   Discharge Needs Assessment   Concerns To Be Addressed basic needs concerns   Discharge Disposition still a patient         Problem: Fall Risk (Adult)  Goal: Identify Related Risk Factors and Signs and Symptoms  Outcome: Ongoing (interventions implemented as appropriate)    07/17/17 1812   Fall Risk   Fall Risk: Related Risk Factors gait/mobility problems;culprit medication(s);history of falls   Fall Risk: Signs and Symptoms presence of risk factors       Goal: Absence of Falls  Outcome: Ongoing (interventions implemented as appropriate)    07/17/17 1812   Fall Risk (Adult)   Absence of Falls making progress toward outcome         Problem: Pain, Acute (Adult)  Goal: Identify Related Risk Factors and Signs and Symptoms  Outcome: Ongoing (interventions implemented as appropriate)    07/17/17 1812   Pain, Acute   Related Risk Factors (Acute Pain) surgery   Signs and Symptoms (Acute Pain) verbalization of pain descriptors       Goal: Acceptable Pain Control/Comfort Level  Outcome: Ongoing (interventions implemented as appropriate)    07/17/17 1812   Pain, Acute (Adult)   Acceptable Pain Control/Comfort Level making progress toward outcome         Problem: Infection, Risk/Actual (Adult)  Goal: Identify Related Risk Factors and Signs and Symptoms  Outcome:  Ongoing (interventions implemented as appropriate)    07/17/17 1812   Infection, Risk/Actual   Infection, Risk/Actual: Related Risk Factors skin integrity impairment   Signs and Symptoms (Infection, Risk/Actual) pain;cultures positive       Goal: Infection Prevention/Resolution  Outcome: Ongoing (interventions implemented as appropriate)    07/17/17 1812   Infection, Risk/Actual (Adult)   Infection Prevention/Resolution making progress toward outcome

## 2017-07-17 NOTE — PLAN OF CARE
Problem: Patient Care Overview (Adult)  Goal: Plan of Care Review    07/17/17 1310   Coping/Psychosocial Response Interventions   Plan Of Care Reviewed With patient   Outcome Evaluation   Outcome Summary/Follow up Plan Pt presents s/p L AKA revision during which approximately 6 cm of infected bone was removed secondary to chronic osteomyelitis. Pt previously ambulated with a prosthetic and axillary crutches, but has manual and power WCs that he used for ambulating long distances. Pt able to stand 2x at EOB to RW with CGA x2. Pt reports his R leg is weak but it feels good to stand and hop. Pt able to take small hops laterally in both directions with AD support. Pt should be educated on HEP for residual limp as his AROM was limited by pain. Pt very pleasant and agreeable to therapy. Pt displays decreased strength and mobility. Pt will benefit from skilled PT to address these deficits and improve functional moblity.         Problem: Inpatient Physical Therapy  Goal: Transfer Training Goal 1 LTG- PT    07/17/17 1310   Transfer Training PT LTG   Transfer Training PT LTG, Date Established 07/17/17   Transfer Training PT LTG, Time to Achieve 1 wk   Transfer Training PT LTG, Activity Type all transfers   Transfer Training PT LTG, [place] Level contact guard assist   Transfer Training PT LTG, Assist Device (Appropriate AD)       Goal: Gait Training Goal LTG- PT    07/17/17 1310   Gait Training PT LTG   Gait Training Goal PT LTG, Date Established 07/17/17   Gait Training Goal PT LTG, Time to Achieve 1 wk   Gait Training Goal PT LTG, [place] Level contact guard assist;2 person assist required   Gait Training Goal PT LTG, Assist Device (Appropriate AD)   Gait Training Goal PT LTG, Distance to Achieve 20

## 2017-07-17 NOTE — PROGRESS NOTES
Discharge Planning Assessment  [facility]     Patient Name: Jay Blackwood  MRN: [id number]  Today's Date: 7/17/2017    Admit Date: 7/14/2017          Discharge Needs Assessment       07/17/17 0952    Living Environment    Lives With significant other    Living Arrangements house    Transportation Available car;family or friend will provide    Living Environment    Provides Primary Care For no one    Discharge Needs Assessment    Concerns To Be Addressed denies needs/concerns at this time    Readmission Within The Last 30 Days no previous admission in last 30 days    Equipment Currently Used at Home bath bench;commode;crutches;grab bar;power chair, (recliner lift);prosthesis;shower chair    Equipment Needed After Discharge wheelchair            Discharge Plan       07/17/17 0997    Case Management/Social Work Plan    Plan Undetermined    Patient/Family In Agreement With Plan yes    Additional Comments Spoke with patient at bedside.  Introduced self, explained CCP role, facesheet verified.  Pt drowsy during discussion and dosing off.  States he lives in house with significant other.  States he has power chair, crutches, wheelchair, comode chair, shower chair, grab bars and prosthetic leg.  Pt states he needs new wheelchair and request [employer].  Discussed with Albina with [employer] who states that because pt recenlty received power chair he will not be eligable for coverage of new wheelchair, will inform patient. Pt states he has used [facility] in past and requests them again if needed.  Spoke with Mal who will follow.  Pt states  he has been to [facility] in past, has also been to a [facility] but unable to tell me which facility as he is dozing off.  Will follow up to determined discharge needs and possible facility choices.  TONE Broussard RN        Discharge Placement     No information found                Demographic Summary       07/17/17 0980    Referral Information    Admission Type inpatient    Arrived From home or  self-care    Referral Source admission list    Reason For Consult discharge planning    Contact Information    Permission Granted to Share Information With family/designee   Stephanie Quick ([phone])    Primary Care Physician Information    Name Amina Malagon JOELLE            Functional Status       07/17/17 0944    Functional Status Current    Ambulation 3-->assistive equipment and person    Transferring 3-->assistive equipment and person    Toileting 2-->assistive person    Bathing 2-->assistive person    Dressing 2-->assistive person    Eating 0-->independent    Communication 0-->understands/communicates without difficulty    Functional Status Prior    Ambulation 1-->assistive equipment    Transferring 1-->assistive equipment    Toileting 0-->independent    Bathing 0-->independent    Dressing 0-->independent    Eating 0-->independent    Communication 0-->understands/communicates without difficulty    Cognitive/Perceptual/Developmental    Current Mental Status/Cognitive Functioning no deficits noted            Psychosocial     None            Abuse/Neglect     None            Legal     None            Substance Abuse     None            Patient Forms     None          Sirisha Broussard

## 2017-07-17 NOTE — PLAN OF CARE
Problem: Patient Care Overview (Adult)  Goal: Plan of Care Review  Outcome: Ongoing (interventions implemented as appropriate)    07/17/17 0551   Coping/Psychosocial Response Interventions   Plan Of Care Reviewed With patient   Patient Care Overview   Progress progress towards functional goals is fair   Outcome Evaluation   Outcome Summary/Follow up Plan Pt drowsy and restless most of evening - went into Afib with RVR @~2213, HR as high as 145, EKG, labs, Cardiology consulted - Cardizem injection and gtt ordered. Pt and VSS. No loose BMs - cdiff negative. Urine retention present, last bladder scan 462 - order to straight cath. Will continue to monitor.        Goal: Adult Individualization and Mutuality  Outcome: Ongoing (interventions implemented as appropriate)  Goal: Discharge Needs Assessment  Outcome: Ongoing (interventions implemented as appropriate)    Problem: Fall Risk (Adult)  Goal: Identify Related Risk Factors and Signs and Symptoms  Outcome: Ongoing (interventions implemented as appropriate)  Goal: Absence of Falls  Outcome: Ongoing (interventions implemented as appropriate)    Problem: Pain, Acute (Adult)  Goal: Identify Related Risk Factors and Signs and Symptoms  Outcome: Ongoing (interventions implemented as appropriate)  Goal: Acceptable Pain Control/Comfort Level  Outcome: Ongoing (interventions implemented as appropriate)    Problem: Infection, Risk/Actual (Adult)  Goal: Identify Related Risk Factors and Signs and Symptoms  Outcome: Ongoing (interventions implemented as appropriate)  Goal: Infection Prevention/Resolution  Outcome: Ongoing (interventions implemented as appropriate)
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
## 2017-07-17 NOTE — PROGRESS NOTES
Continued Stay Note  [facility]     Patient Name: Jay Blackwood  MRN: [id number]  Today's Date: 7/17/2017    Admit Date: 7/14/2017          Discharge Plan       07/17/17 1158    Case Management/Social Work Plan    Plan Home with VNA     Patient/Family In Agreement With Plan yes    Additional Comments Received call from A who states pt is current with A . Verified with pt and significant other murtaza nformed Mal with [facility].  TONE Broussard RN      07/17/17 0905    Case Management/Social Work Plan    Plan Undetermined    Patient/Family In Agreement With Plan yes    Additional Comments Spoke with patient at bedside.  Introduced self, explained CCP role, facesheet verified.  Pt drowsy during discussion and dosing off.  States he lives in house with significant other.  States he has power chair, crutches, wheelchair, comode chair, shower chair, grab bars and prosthetic leg.  Pt states he needs new wheelchair and request [employer].  Discussed with Albina with [employer] who states that because pt recenlty received power chair he will not be eligable for coverage of new wheelchair, will inform patient. Pt states he has used [facility] in past and requests them again if needed.  Spoke with Mal who will follow.  Pt states  he has been to [facility] in past, has also been to a [facility] but unable to tell me which facility as he is dozing off.  Will follow up to determined discharge needs and possible facility choices.  TONE Broussard RN              Discharge Codes     None            Sirisha Broussard

## 2017-07-17 NOTE — PLAN OF CARE
Problem: Patient Care Overview (Adult)  Goal: Plan of Care Review  Outcome: Ongoing (interventions implemented as appropriate)    07/17/17 0421   Outcome Evaluation   Outcome Summary/Follow up Plan f/c placed today per nephrology for retention

## 2017-07-17 NOTE — PROGRESS NOTES
"Pharmacokinetic Consult - Vancomycin Dosing (Follow-up Note)  Patient: Jay Blackwood  : 1960  MRN: [id number]  Admit date: 2017  9:49 AM    Day 4  Consult for Dr Hawthorne  Treating: MRSA + LLE stump osteomyelitis; ID following  Goal trough: 15-20 mcg/ml    Relevant clinical data and objective history reviewed:  57 y.o. male 74\" (188 cm) 216 lb 3.2 oz (98.1 kg)   Seen in ID clinic outpt; at the time of note on 7/3 had planned for 6 week therapy based on dates of sx; surgical revision completed on  - tentative stop date     Creatinine   Date Value Ref Range Status   2017 4.14 (H) 0.76 - 1.27 mg/dL Final   2017 3.63 (H) 0.76 - 1.27 mg/dL Final   07/15/2017 3.14 (H) 0.76 - 1.27 mg/dL Final     BUN   Date Value Ref Range Status   2017 41 (H) 6 - 20 mg/dL Final     Estimated Creatinine Clearance: 27.3 mL/min (by C-G formula based on Cr of 4.14).    Lab Results   Component Value Date    WBC 8.85 2017    WBC 7.62 2017    WBC 11.23 (H) 07/15/2017     Temp:  [97.9 °F (36.6 °C)-100.2 °F (37.9 °C)] 100.2 °F (37.9 °C)  Heart Rate:  [] 76  Resp:  [12-22] 20  BP: ()/() 142/56    Baseline cultures/labs/radiology:   blood cx x2: NGTD   C.diff negative   MRI with evidence of osteomyelitis per ID clinic note on 7/3     IV Anti-Infectives     Ordered     Dose/Rate Route Frequency Start Stop    17 0849  Vancomycin Pharmacy Intermittent Dosing     Ordering Provider:  Mark Hawthorne MD     Does not apply Daily 17 0930      17  Pharmacy to dose vancomycin     Ordering Provider:  Mark Hawthorne MD     Does not apply Continuous PRN 17 1001  ceFAZolin in dextrose (ANCEF) IVPB solution 2 g     Ordering Provider:  Mark Hawthorne MD    2 g Intravenous Once 17 1045 17 1335         Vancomycin dosing history:   Pt initiated on vanc outpt on  - at the time was on 1250 q24; home med rec " notes 750 daily   7/14: 1500 mg q24 started - given at 2016  7/15: timing changed to 1400 - given at 1445  7/16 1410 random level: 25 mcg/ml; dosing held  7/17 0458 random level: 23.3 mcg/ml     Assessment/Plan  1.  Scr increased today, continue to dose vancomycin intermittently. Level remains above desired range of 15-20 mcg/ml. No dose today.  2. Repeat random level in AM tomorrow.  Pharmacy will continue to follow daily while on vancomycin and adjust as needed.     Thank you,  Fabiana Madsen, PharmD, BCPS  07/17/17 2:56 PM

## 2017-07-17 NOTE — PROGRESS NOTES
Name: Jay Blackwood ADMIT: 2017   : 1960  PCP: JOELLE Pak    MRN: [id number] LOS: 3 days   AGE/SEX: 57 y.o. male  ROOM: [facility]     Subjective     HPI: 57 y.o. male postop day #3 status post left AKA revision for chronic refractory osteomyelitis patient complains of pain.  Did go into atrial fibrillation last night with rapid ventricular response.  Responded to medical therapy.  Continues to complain of pain.  Currently 8  10.    Review of Systems    Objective     Vital Signs  Temp:  [97.9 °F (36.6 °C)-100 °F (37.8 °C)] 100 °F (37.8 °C)  Heart Rate:  [] 112  Resp:  [12-22] 22  BP: ()/() 116/50         Physical Exam  Vascular: Dressing removed.  Mild to moderate edema.  No drainage.  Staple line looks good without evidence of skin edge necrosis      Results from last 7 days  Lab Units 17  0458 17  0518 07/15/17  0439   WBC 10*3/mm3 8.85 7.62 11.23*   HEMOGLOBIN g/dL 8.2* 9.0* 6.5*   PLATELETS 10*3/mm3 133* 152 167     Results from last 7 days  Lab Units 17  0458 17  2333 1718 07/15/17  0439   SODIUM mmol/L 137  --  138 133*   POTASSIUM mmol/L 4.4 4.4 4.3 4.4   CHLORIDE mmol/L 111*  --  109* 100   CO2 mmol/L 13.3*  --  14.9* 18.4*   BUN mg/dL 41*  --  33* 35*   CREATININE mg/dL 4.14*  --  3.63* 3.14*   GLUCOSE mg/dL 95  --  130* 291*   Estimated Creatinine Clearance: 27.3 mL/min (by C-G formula based on Cr of 4.14).      Billin, Post Op Global      Assessment/Plan     Active Hospital Problems (** Indicates Principal Problem)    Diagnosis Date Noted   • **Chronic osteomyelitis of left femur [M86.652] 2017   • Chronic osteomyelitis [M86.60] 2017   • A-fib [I48.91] 10/26/2016   • Diabetes mellitus [E11.9] 10/26/2016   • PVD (peripheral vascular disease) [I73.9] 10/26/2016      Resolved Hospital Problems    Diagnosis Date Noted Date Resolved   No resolved problems to display.        Assessment & Plan  57 y.o. male postop  day #3 status post revision of left AKA stump for chronic refractory ostomy mellitus.      Postop course has been complicated by blood loss requiring transfusion of 3 units of blood.  Creatinine down to 8.2 from 9.0 yesterday.  We'll consider transfusion tomorrow if hemoglobin less than 8.    Patient went into atrial fibrillation last night.  On Cardizem.  He does have a past medical history significant for this.  Cardiology consultation    Vancomycin per infectious disease.  Complicated by acute kidney injury on chronic kidney disease    Acute kidney injury on chronic kidney disease: Creatinine up to 4.1 from 3.6 today.  Appreciate renal's assistance.      He has multiple medical issues that are chronic in nature but some of these are acutely worse.  We'll need to get a better handle on this before we can consider disposition.      Mark Hawthorne MD  07/17/17  7:42 AM  Office Number [phone]

## 2017-07-17 NOTE — DISCHARGE PLACEMENT REQUEST
"Jay Degroot (57 y.o. Male)     Date of Birth Social Security Number Address Home Phone MRN    1960  [address] [address] [phone] [id number]    Sh[place] Marital Status          None Single       Admission Date Admission Type Admitting Provider Attending Provider Department, Room/Bed    7/14/17 Elective Mark Hawthorne MD Thomas, Bradley Glynn, MD [address], 556/1    Discharge Date Discharge Disposition Discharge Destination                      Attending Provider: Mark Hawthorne MD     Allergies:  Penicillins    Isolation:  Contact   Infection:  MRSA (06/18/17)   Code Status:  FULL    Ht:  74\" (188 cm)   Wt:  216 lb 3.2 oz (98.1 kg)    Admission Cmt:  None   Principal Problem:  Chronic osteomyelitis of left femur [M86.652]                 Active Insurance as of 7/14/2017     Primary Coverage     Payor Plan Insurance Group Employer/Plan Group    ANTHEM MEDICARE REPLACEMENT ANTH MEDICARE ADVANTAGE [id number]     Payor Plan Address Payor Plan Phone Number Effective From Effective To    [address] [phone] 1/1/2017     [place] [address]       Subscriber Name Subscriber Birth Date Member ID       JYA DEGROOT 1960 [id number]                 Emergency Contacts      (Rel.) Home Phone Work Phone Mobile Phone    Stephanie Quick (Significant Other) -- -- [phone]            "

## 2017-07-18 LAB
ALBUMIN SERPL-MCNC: 2.5 G/DL (ref 3.5–5.2)
ANION GAP SERPL CALCULATED.3IONS-SCNC: 15.9 MMOL/L
BASOPHILS # BLD AUTO: 0.01 10*3/MM3 (ref 0–0.2)
BASOPHILS NFR BLD AUTO: 0.1 % (ref 0–1.5)
BUN BLD-MCNC: 42 MG/DL (ref 6–20)
BUN/CREAT SERPL: 9.9 (ref 7–25)
CALCIUM SPEC-SCNC: 7.7 MG/DL (ref 8.6–10.5)
CHLORIDE SERPL-SCNC: 107 MMOL/L (ref 98–107)
CO2 SERPL-SCNC: 14.1 MMOL/L (ref 22–29)
CREAT BLD-MCNC: 4.23 MG/DL (ref 0.76–1.27)
DEPRECATED RDW RBC AUTO: 57 FL (ref 37–54)
EOSINOPHIL # BLD AUTO: 0.36 10*3/MM3 (ref 0–0.7)
EOSINOPHIL NFR BLD AUTO: 4.6 % (ref 0.3–6.2)
ERYTHROCYTE [DISTWIDTH] IN BLOOD BY AUTOMATED COUNT: 16 % (ref 11.5–14.5)
GFR SERPL CREATININE-BSD FRML MDRD: 15 ML/MIN/1.73
GLUCOSE BLD-MCNC: 83 MG/DL (ref 65–99)
GLUCOSE BLDC GLUCOMTR-MCNC: 101 MG/DL (ref 70–130)
GLUCOSE BLDC GLUCOMTR-MCNC: 110 MG/DL (ref 70–130)
GLUCOSE BLDC GLUCOMTR-MCNC: 110 MG/DL (ref 70–130)
GLUCOSE BLDC GLUCOMTR-MCNC: 68 MG/DL (ref 70–130)
GLUCOSE BLDC GLUCOMTR-MCNC: 71 MG/DL (ref 70–130)
GLUCOSE BLDC GLUCOMTR-MCNC: 85 MG/DL (ref 70–130)
HCT VFR BLD AUTO: 24.1 % (ref 40.4–52.2)
HGB BLD-MCNC: 7.7 G/DL (ref 13.7–17.6)
IMM GRANULOCYTES # BLD: 0.02 10*3/MM3 (ref 0–0.03)
IMM GRANULOCYTES NFR BLD: 0.3 % (ref 0–0.5)
LYMPHOCYTES # BLD AUTO: 0.47 10*3/MM3 (ref 0.9–4.8)
LYMPHOCYTES NFR BLD AUTO: 6.1 % (ref 19.6–45.3)
MAGNESIUM SERPL-MCNC: 1.9 MG/DL (ref 1.6–2.6)
MCH RBC QN AUTO: 30.8 PG (ref 27–32.7)
MCHC RBC AUTO-ENTMCNC: 32 G/DL (ref 32.6–36.4)
MCV RBC AUTO: 96.4 FL (ref 79.8–96.2)
MONOCYTES # BLD AUTO: 1.2 10*3/MM3 (ref 0.2–1.2)
MONOCYTES NFR BLD AUTO: 15.5 % (ref 5–12)
NEUTROPHILS # BLD AUTO: 5.69 10*3/MM3 (ref 1.9–8.1)
NEUTROPHILS NFR BLD AUTO: 73.4 % (ref 42.7–76)
PHOSPHATE SERPL-MCNC: 5.4 MG/DL (ref 2.5–4.5)
PLATELET # BLD AUTO: 139 10*3/MM3 (ref 140–500)
PMV BLD AUTO: 9.6 FL (ref 6–12)
POTASSIUM BLD-SCNC: 4.1 MMOL/L (ref 3.5–5.2)
RBC # BLD AUTO: 2.5 10*6/MM3 (ref 4.6–6)
SODIUM BLD-SCNC: 137 MMOL/L (ref 136–145)
URATE SERPL-MCNC: 9.4 MG/DL (ref 3.4–7)
VANCOMYCIN SERPL-MCNC: 18.6 MCG/ML (ref 5–40)
WBC NRBC COR # BLD: 7.75 10*3/MM3 (ref 4.5–10.7)

## 2017-07-18 PROCEDURE — 25010000002 HYDROMORPHONE PER 4 MG: Performed by: SURGERY

## 2017-07-18 PROCEDURE — 85025 COMPLETE CBC W/AUTO DIFF WBC: CPT | Performed by: SURGERY

## 2017-07-18 PROCEDURE — 80202 ASSAY OF VANCOMYCIN: CPT | Performed by: SURGERY

## 2017-07-18 PROCEDURE — P9016 RBC LEUKOCYTES REDUCED: HCPCS

## 2017-07-18 PROCEDURE — 85025 COMPLETE CBC W/AUTO DIFF WBC: CPT | Performed by: INTERNAL MEDICINE

## 2017-07-18 PROCEDURE — 82962 GLUCOSE BLOOD TEST: CPT

## 2017-07-18 PROCEDURE — 36430 TRANSFUSION BLD/BLD COMPNT: CPT

## 2017-07-18 PROCEDURE — 86923 COMPATIBILITY TEST ELECTRIC: CPT

## 2017-07-18 PROCEDURE — 99231 SBSQ HOSP IP/OBS SF/LOW 25: CPT | Performed by: INTERNAL MEDICINE

## 2017-07-18 PROCEDURE — 25010000002 HYDROMORPHONE PER 4 MG: Performed by: INTERNAL MEDICINE

## 2017-07-18 PROCEDURE — 25010000002 HEPARIN (PORCINE) PER 1000 UNITS: Performed by: SURGERY

## 2017-07-18 PROCEDURE — 86900 BLOOD TYPING SEROLOGIC ABO: CPT

## 2017-07-18 PROCEDURE — 83735 ASSAY OF MAGNESIUM: CPT | Performed by: INTERNAL MEDICINE

## 2017-07-18 PROCEDURE — 84550 ASSAY OF BLOOD/URIC ACID: CPT | Performed by: INTERNAL MEDICINE

## 2017-07-18 PROCEDURE — 97110 THERAPEUTIC EXERCISES: CPT

## 2017-07-18 PROCEDURE — 25010000002 VANCOMYCIN: Performed by: SURGERY

## 2017-07-18 PROCEDURE — 85730 THROMBOPLASTIN TIME PARTIAL: CPT | Performed by: SURGERY

## 2017-07-18 PROCEDURE — 80069 RENAL FUNCTION PANEL: CPT | Performed by: INTERNAL MEDICINE

## 2017-07-18 PROCEDURE — 85610 PROTHROMBIN TIME: CPT | Performed by: SURGERY

## 2017-07-18 PROCEDURE — 94799 UNLISTED PULMONARY SVC/PX: CPT

## 2017-07-18 PROCEDURE — 25010000002 FUROSEMIDE PER 20 MG: Performed by: INTERNAL MEDICINE

## 2017-07-18 RX ORDER — FUROSEMIDE 10 MG/ML
60 INJECTION INTRAMUSCULAR; INTRAVENOUS ONCE
Status: COMPLETED | OUTPATIENT
Start: 2017-07-18 | End: 2017-07-18

## 2017-07-18 RX ORDER — TAMSULOSIN HYDROCHLORIDE 0.4 MG/1
0.8 CAPSULE ORAL DAILY
Status: DISCONTINUED | OUTPATIENT
Start: 2017-07-19 | End: 2017-07-25 | Stop reason: HOSPADM

## 2017-07-18 RX ORDER — GABAPENTIN 100 MG/1
200 CAPSULE ORAL EVERY 12 HOURS SCHEDULED
Status: DISCONTINUED | OUTPATIENT
Start: 2017-07-18 | End: 2017-07-19

## 2017-07-18 RX ORDER — MELATONIN
5000 DAILY
Status: DISCONTINUED | OUTPATIENT
Start: 2017-07-19 | End: 2017-07-25 | Stop reason: HOSPADM

## 2017-07-18 RX ADMIN — SODIUM CHLORIDE 125 ML/HR: 9 INJECTION, SOLUTION INTRAVENOUS at 04:21

## 2017-07-18 RX ADMIN — SODIUM BICARBONATE 1300 MG: 650 TABLET ORAL at 20:15

## 2017-07-18 RX ADMIN — IPRATROPIUM BROMIDE AND ALBUTEROL SULFATE 3 ML: .5; 3 SOLUTION RESPIRATORY (INHALATION) at 23:00

## 2017-07-18 RX ADMIN — IPRATROPIUM BROMIDE AND ALBUTEROL SULFATE 3 ML: .5; 3 SOLUTION RESPIRATORY (INHALATION) at 15:33

## 2017-07-18 RX ADMIN — IPRATROPIUM BROMIDE AND ALBUTEROL SULFATE 3 ML: .5; 3 SOLUTION RESPIRATORY (INHALATION) at 07:25

## 2017-07-18 RX ADMIN — OXYCODONE HYDROCHLORIDE AND ACETAMINOPHEN 2 TABLET: 10; 325 TABLET ORAL at 13:15

## 2017-07-18 RX ADMIN — HYDROMORPHONE HYDROCHLORIDE 1 MG: 1 INJECTION, SOLUTION INTRAMUSCULAR; INTRAVENOUS; SUBCUTANEOUS at 18:15

## 2017-07-18 RX ADMIN — CARVEDILOL 3.12 MG: 3.12 TABLET, FILM COATED ORAL at 20:15

## 2017-07-18 RX ADMIN — HEPARIN SODIUM 10.2 UNITS/KG/HR: 10000 INJECTION, SOLUTION INTRAVENOUS at 18:26

## 2017-07-18 RX ADMIN — SODIUM BICARBONATE 1300 MG: 650 TABLET ORAL at 08:15

## 2017-07-18 RX ADMIN — SODIUM BICARBONATE 1300 MG: 650 TABLET ORAL at 18:14

## 2017-07-18 RX ADMIN — VANCOMYCIN HYDROCHLORIDE 500 MG: 500 INJECTION, POWDER, LYOPHILIZED, FOR SOLUTION INTRAVENOUS at 18:14

## 2017-07-18 RX ADMIN — TAMSULOSIN HYDROCHLORIDE 0.4 MG: 0.4 CAPSULE ORAL at 08:15

## 2017-07-18 RX ADMIN — NICOTINE 1 PATCH: 14 PATCH, EXTENDED RELEASE TRANSDERMAL at 21:52

## 2017-07-18 RX ADMIN — SODIUM CHLORIDE 125 ML/HR: 9 INJECTION, SOLUTION INTRAVENOUS at 14:18

## 2017-07-18 RX ADMIN — FAMOTIDINE 20 MG: 20 TABLET, FILM COATED ORAL at 08:15

## 2017-07-18 RX ADMIN — GABAPENTIN 300 MG: 300 CAPSULE ORAL at 08:15

## 2017-07-18 RX ADMIN — FUROSEMIDE 60 MG: 10 INJECTION, SOLUTION INTRAMUSCULAR; INTRAVENOUS at 18:15

## 2017-07-18 RX ADMIN — ASPIRIN 81 MG: 81 TABLET ORAL at 08:15

## 2017-07-18 RX ADMIN — GABAPENTIN 200 MG: 100 CAPSULE ORAL at 20:15

## 2017-07-18 RX ADMIN — HYDROMORPHONE HYDROCHLORIDE 0.5 MG: 1 INJECTION, SOLUTION INTRAMUSCULAR; INTRAVENOUS; SUBCUTANEOUS at 00:02

## 2017-07-18 RX ADMIN — OXYCODONE HYDROCHLORIDE AND ACETAMINOPHEN 2 TABLET: 10; 325 TABLET ORAL at 00:53

## 2017-07-18 RX ADMIN — SERTRALINE 100 MG: 100 TABLET, FILM COATED ORAL at 08:15

## 2017-07-18 RX ADMIN — CARVEDILOL 3.12 MG: 3.12 TABLET, FILM COATED ORAL at 08:15

## 2017-07-18 RX ADMIN — ATORVASTATIN CALCIUM 40 MG: 20 TABLET, FILM COATED ORAL at 08:15

## 2017-07-18 NOTE — PLAN OF CARE
"Problem: Patient Care Overview (Adult)  Goal: Plan of Care Review  Outcome: Ongoing (interventions implemented as appropriate)    07/18/17 0935   Coping/Psychosocial Response Interventions   Plan Of Care Reviewed With patient   Outcome Evaluation   Outcome Summary/Follow up Plan Pt with improved upright stability, able to ambulate ~8ft. Emphasis was placed on \"lift/lower\" technique rather than \"hop\" to inhibit traumatic impact(s) to (R)foot. HEP reviewed, no new concerns at this time.            "

## 2017-07-18 NOTE — PROGRESS NOTES
"   LOS: 4 days    Patient Care Team:  JOELLE Pak as PCP - General (Family Medicine)  Prosper Oh MD as Consulting Physician (Cardiology)    Chief Complaint:  No chief complaint on file.      Subjective  Follow up KRISTA on CKD IV.      Interval History:   Sleeping,  Snoring loudly.  When awakened, says he has NORY but cannot tolerate the mask.   Blood sugar low this am.  Pain not well controlled.  Has \"burn\" above area of incision.       Objective     Vital Signs  Temp:  [97 °F (36.1 °C)-99.3 °F (37.4 °C)] 97 °F (36.1 °C)  Heart Rate:  [61-74] 61  Resp:  [18-20] 18  BP: (108-150)/(55-74) 135/62    Flowsheet Rows         First Filed Value    Admission Height  74\" (188 cm) Documented at 07/14/2017 1002    Admission Weight  220 lb (99.8 kg) Documented at 07/14/2017 1002          I/O this shift:  In: 100 [P.O.:100]  Out: 450 [Urine:450]  I/O last 3 completed shifts:  In: 4400 [P.O.:1440; I.V.:2960]  Out: 1800 [Urine:1800]    Intake/Output Summary (Last 24 hours) at 07/18/17 1651  Last data filed at 07/18/17 1100   Gross per 24 hour   Intake              580 ml   Output             1250 ml   Net             -670 ml       Physical Exam:  General Appearance: alert, oriented x 3.  Skin: warm and dry  Neck: supple, no JVD, trachea midline  Lungs: clear to auscultation   Heart: RRR, normal S1 and S2, no S3, no rub  Abdomen: soft, non-tender, + bs  Extremities: 2+ edema of the right leg, Left AKA staples.  Linear blister on  Mar[place] base above incision.   Neuro: normal speech and mental status      Results Review:      Results from last 7 days  Lab Units 07/18/17  0452 07/17/17  0458 07/16/17  2333 07/16/17  0518   SODIUM mmol/L 137 137  --  138   POTASSIUM mmol/L 4.1 4.4 4.4 4.3   CHLORIDE mmol/L 107 111*  --  109*   CO2 mmol/L 14.1* 13.3*  --  14.9*   BUN mg/dL 42* 41*  --  33*   CREATININE mg/dL 4.23* 4.14*  --  3.63*   CALCIUM mg/dL 7.7* 7.7*  --  7.7*   GLUCOSE mg/dL 83 95  --  130*       Estimated Creatinine " Clearance: 26.7 mL/min (by C-G formula based on Cr of 4.23).      Results from last 7 days  Lab Units 07/18/17  0452 07/17/17  0458 07/16/17  2333   MAGNESIUM mg/dL 1.9 1.7 1.6   PHOSPHORUS mg/dL 5.4* 4.9*  --          Results from last 7 days  Lab Units 07/18/17  0452   URIC ACID mg/dL 9.4*         Results from last 7 days  Lab Units 07/18/17  0452 07/17/17  0458 07/16/17  0518 07/15/17  0439   WBC 10*3/mm3 7.75 8.85 7.62 11.23*   HEMOGLOBIN g/dL 7.7* 8.2* 9.0* 6.5*   PLATELETS 10*3/mm3 139* 133* 152 167               Imaging Results (last 24 hours)     ** No results found for the last 24 hours. **          aspirin 81 mg Oral Daily   atorvastatin 40 mg Oral Daily   carvedilol 3.125 mg Oral Q12H   famotidine 20 mg Intravenous Daily   Or      famotidine 20 mg Oral Daily   gabapentin 300 mg Oral Q12H   insulin aspart 0-9 Units Subcutaneous 4x Daily With Meals & Nightly   insulin detemir 15 Units Subcutaneous Nightly   ipratropium-albuterol 3 mL Nebulization Q8H - RT   nicotine 1 patch Transdermal Q24H   sertraline 100 mg Oral Daily   sodium bicarbonate 1,300 mg Oral TID   tamsulosin 0.4 mg Oral Daily   vancomycin 500 mg Intravenous Once   Vancomycin Pharmacy Intermittent Dosing  Does not apply Daily       heparin (porcine) 10.2 Units/kg/hr    Pharmacy to dose vancomycin     sodium chloride 125 mL/hr Last Rate: 125 mL/hr (07/18/17 1418)       Medication Review:   Current Facility-Administered Medications   Medication Dose Route Frequency Provider Last Rate Last Dose   • aspirin EC tablet 81 mg  81 mg Oral Daily Mark Hawthorne MD   81 mg at 07/18/17 0815   • atorvastatin (LIPITOR) tablet 40 mg  40 mg Oral Daily Mark Hawthorne MD   40 mg at 07/18/17 0815   • carvedilol (COREG) tablet 3.125 mg  3.125 mg Oral Q12H Romeo Agee MD   3.125 mg at 07/18/17 0815   • dextrose (D50W) solution 25 g  25 g Intravenous Q15 Min PRN Mark Hawthorne MD       • dextrose (GLUTOSE) oral gel 15 g  15 g Oral Q15  Min PRN Mark Hawthorne MD       • famotidine (PEPCID) injection 20 mg  20 mg Intravenous Daily Mark Hawthorne MD        Or   • famotidine (PEPCID) tablet 20 mg  20 mg Oral Daily Mark Hawthorne MD   20 mg at 07/18/17 0815   • gabapentin (NEURONTIN) capsule 300 mg  300 mg Oral Q12H Mark Hawthorne MD   300 mg at 07/18/17 0815   • glucagon (human recombinant) (GLUCAGEN DIAGNOSTIC) injection 1 mg  1 mg Subcutaneous Q15 Min PRN Mark Hawthorne MD       • heparin 10823 units/250 mL (100 units/mL) in 0.45 % NaCl infusion  10.2 Units/kg/hr Intravenous Titrated Mark Hawthorne MD       • HYDROmorphone (DILAUDID) injection 0.5 mg  0.5 mg Intravenous Q2H PRN Leticia Stanton MD   0.5 mg at 07/18/17 0002   • insulin aspart (novoLOG) injection 0-9 Units  0-9 Units Subcutaneous 4x Daily With Meals & Nightly Mark Hawthorne MD   2 Units at 07/16/17 0810   • insulin detemir (LEVEMIR) injection 15 Units  15 Units Subcutaneous Nightly Mark Hawthorne MD   15 Units at 07/17/17 2124   • ipratropium-albuterol (DUO-NEB) nebulizer solution 3 mL  3 mL Nebulization Q8H - RT Mark Hawthorne MD   3 mL at 07/18/17 1533   • morphine injection 4 mg  4 mg Intravenous Q2H PRN Leticia Stanton MD   4 mg at 07/15/17 1235   • naloxone (NARCAN) injection 0.4 mg  0.4 mg Intravenous Q5 Min PRN Leticia Stanton MD       • nicotine (NICODERM CQ) 14 MG/24HR patch 1 patch  1 patch Transdermal Q24H Leticia Stanton MD   1 patch at 07/17/17 2125   • nitroglycerin (NITROSTAT) SL tablet 0.4 mg  0.4 mg Sublingual Q5 Min PRN Mark Hawthorne MD       • ondansetron (ZOFRAN) tablet 4 mg  4 mg Oral Q6H PRN Mark Hawthorne MD        Or   • ondansetron ODT (ZOFRAN-ODT) disintegrating tablet 4 mg  4 mg Oral Q6H PRN Mark Hawthorne MD        Or   • ondansetron (ZOFRAN) injection 4 mg  4 mg Intravenous Q6H PRN Mark Hawthorne MD   4 mg at 07/16/17 1048   • oxyCODONE-acetaminophen  (PERCOCET)  MG per tablet 2 tablet  2 tablet Oral Q6H PRN Mark Hawthorne MD   2 tablet at 07/18/17 1315   • oxyCODONE-acetaminophen (PERCOCET) 5-325 MG per tablet 1 tablet  1 tablet Oral Q4H PRN Mark Hawthorne MD   1 tablet at 07/17/17 0346   • Pharmacy to dose vancomycin   Does not apply Continuous PRN Mark Hawthorne MD       • sennosides-docusate sodium (SENOKOT-S) 8.6-50 MG tablet 2 tablet  2 tablet Oral BID PRN Mark Hawthorne MD       • sertraline (ZOLOFT) tablet 100 mg  100 mg Oral Daily Mark Hawthorne MD   100 mg at 07/18/17 0815   • sodium bicarbonate tablet 1,300 mg  1,300 mg Oral TID Hiro Roberson MD   1,300 mg at 07/18/17 0815   • sodium chloride 0.9 % infusion  125 mL/hr Intravenous Continuous Mark Hawthorne  mL/hr at 07/18/17 1418 125 mL/hr at 07/18/17 1418   • tamsulosin (FLOMAX) 24 hr capsule 0.4 mg  0.4 mg Oral Daily Mark Hawthorne MD   0.4 mg at 07/18/17 0815   • vancomycin 500 mg/100 mL 0.9% NS IVPB (mbp)  500 mg Intravenous Once Mark Hawthorne MD       • Vancomycin Pharmacy Intermittent Dosing   Does not apply Daily Mark Hawthorne MD           Assessment/Plan   1.  Acute kidney injury on chronic kidney disease IV.  Creatinine not really changed today. Urine output excellent.   2.  Urinary retention,  Garcia placed yesterday.   3.  Revision left AKA infected stump. POD 4.  4.  ABL A PRBC today.   5.  Peripheral vascular disease and carotid disease  6.  Hypotension resolved  7.  Metabolic acidosis, anion gap.  PO bicarb.   8. DM2.  Hypoglycemic on scheduled levemir.   9. Untreated NORY due to inability to tolerate mask.    10. Vit D deficiency.     Plan:  1. DW Dr. Hawthorne.  Ok for transfusion.   2. Increase flomax to 0.8 mg.   3. Lasix iv now before PRBC  4. Decrease neurontin dose  5. DC IVF  6. Dc levemir.   7. Replace Vit D    Heidi Medel MD  07/18/17  4:51 PM

## 2017-07-18 NOTE — THERAPY TREATMENT NOTE
Acute Care - Physical Therapy Treatment Note  [facility]     Patient Name: Jay Blackwood  : 1960  MRN: [id number]  Today's Date: 2017  Onset of Illness/Injury or Date of Surgery Date: 17     Referring Physician: Christoph Rivers Date: 2017    Visit Dx:    ICD-10-CM ICD-9-CM   1. Decreased mobility R26.89 781.99   2. Chronic osteomyelitis M86.60 730.10     Patient Active Problem List   Diagnosis   • OA (osteoarthritis) of knee   • Diabetes mellitus   • Hypertension   • A-fib   • Neuropathic pain   • PVD (peripheral vascular disease)   • HLD (hyperlipidemia)   • Chronic osteomyelitis of left femur   • Chronic osteomyelitis               Adult Rehabilitation Note       17 0911          Rehab Assessment/Intervention    Discipline physical therapy assistant  -SHARI      Document Type therapy note (daily note)  -SHARI      Subjective Information agree to therapy  -SHARI      Patient Effort, Rehab Treatment good  -SHARI      Precautions/Limitations fall precautions  -SHARI      Specific Treatment Considerations (L)AKA Revision  -SHARI      Recorded by [SHARI] Sai Stock PTA      Pain Assessment    Pain Assessment 0-10  -SHARI      Pain Score 7  -SHARI      Pain Type Surgical pain  -SHARI      Pain Location Leg  -SHARI      Pain Orientation Left;Upper  -SHARI      Pain Intervention(s) Ambulation/increased activity;Repositioned;Rest  -SHARI      Recorded by [SHARI] Sai Stock PTA      Cognitive Assessment/Intervention    Current Cognitive/Communication Assessment functional  -SHARI      Orientation Status oriented x 4  -SHARI      Follows Commands/Answers Questions 100% of the time  -SHARI      Personal Safety WNL/WFL  -SHARI      Personal Safety Interventions fall prevention program maintained;gait belt;nonskid shoes/slippers when out of bed  -SHARI      Recorded by [SHARI] Sai Stock PTA      Bed Mobility, Assessment/Treatment    Bed Mobility, Assistive Device bed rails;head of bed elevated  -SHARI      Bed Mobility, Scoot/Bridge, [place]  "supervision required  -SHARI      Bed Mob, Supine to Sit, [place] not tested  -SHARI      Bed Mobility, Safety Issues decreased use of legs for bridging/pushing  -SHARI      Bed Mobility, Impairments strength decreased  -SHARI      Recorded by [SHARI] Sai Stock PTA      Transfer Assessment/Treatment    Transfers, Sit-Stand [place] contact guard assist;verbal cues required  -SHARI      Transfers, Stand-Sit [place] contact guard assist;verbal cues required  -SHARI      Transfers, Sit-Stand-Sit, Assist Device rolling walker  -SHARI      Transfer, Impairments strength decreased;impaired balance  -SHARI      Recorded by [SHARI] Sai Stock PTA      Gait Assessment/Treatment    Gait, [place] Level contact guard assist;verbal cues required  -SHARI      Gait, Assistive Device rolling walker  -SHARI      Gait, Distance (Feet) 8  -SHARI      Gait, Gait Deviations edil decreased;limb motion velocity decreased;step length decreased  -SHARI      Gait, Safety Issues step length decreased;balance decreased during turns  -SHARI      Gait, Impairments strength decreased;impaired balance  -SHARI      Gait, Comment Emphasis placed on \"lift/lower\" technique rather than \"hop\" to inhibit traumatic impact(s) on (R)foot.  -SHARI      Recorded by [SHARI] Sai Stock PTA      Balance Skills Training    Standing-Level of Assistance Contact guard  -SHARI      Static Standing Balance Support assistive device  -SHARI      Standing-Balance Activities Weight Shift A-P   static  -SHARI      Standing Balance # of Minutes 4  -SHARI      Recorded by [SHARI] Sai Stock PTA      Therapy Exercises    Right Lower Extremity AROM:;10 reps;supine;heel slides;hip abduction/adduction;ankle pumps/circles  -SHARI      Left Lower Extremity AROM:;10 reps;hip flexion;hip extension;hip abduction/adduction  -SHARI      Recorded by [SHARI] Sai Stock PTA      Positioning and Restraints    Pre-Treatment Position in bed  -SHARI      Post Treatment Position chair  -SHARI      In Chair reclined;call light within " reach;encouraged to call for assist;with nsg   w/ SRNA Shreya  -SHARI      Recorded by [SHARI] Sai Stock PTA        User Key  (r) = Recorded By, (t) = Taken By, (c) = Cosigned By    Initials Name Effective Dates    SHARI Stock PTA 04/24/15 -                 IP PT Goals       07/17/17 1310          Transfer Training PT LTG    Transfer Training PT LTG, Date Established 07/17/17  -KH (r) GERA (t) KH (c)      Transfer Training PT LTG, Time to Achieve 1 wk  -KH (r) GERA (t) KH (c)      Transfer Training PT LTG, Activity Type all transfers  -KH (r) GERA (t) KH (c)      Transfer Training PT LTG, [place] Level contact guard assist  -KH (r) GERA (t) KH (c)      Transfer Training PT LTG, Assist Device --   Appropriate AD  -KH (r) GERA (t) KH (c)      Gait Training PT LTG    Gait Training Goal PT LTG, Date Established 07/17/17  -KH (r) GERA (t) KH (c)      Gait Training Goal PT LTG, Time to Achieve 1 wk  -KH (r) GERA (t) KH (c)      Gait Training Goal PT LTG, [place] Level contact guard assist;2 person assist required  -KH (r) GERA (t) KH (c)      Gait Training Goal PT LTG, Assist Device --   Appropriate AD  -KH (r) GERA (t) KH (c)      Gait Training Goal PT LTG, Distance to Achieve 20  -KH (r) GERA (t) KH (c)        User Key  (r) = Recorded By, (t) = Taken By, (c) = Cosigned By    Initials Name Provider Type    ARMANI Sifuentes, PT Physical Therapist    GERA Flowers, PT Student PT Student          Physical Therapy Education     Title: PT OT SLP Therapies (Done)     Topic: Physical Therapy (Done)     Point: Mobility training (Done)    Learning Progress Summary    Learner Readiness Method Response Comment Documented by Status   Patient Acceptance E SALO VILLALBA 07/18/17 0935 Done    Acceptance E SALO VILLALBA 07/17/17 1310 Done               Point: Home exercise program (Done)    Learning Progress Summary    Learner Readiness Method Response Comment Documented by Status   Patient Acceptance E SALO VILLALBA 07/18/17 0935 Done     "Acceptance E VU,NR  GERA 07/17/17 1310 Done               Point: Body mechanics (Done)    Learning Progress Summary    Learner Readiness Method Response Comment Documented by Status   Patient Acceptance E VU,NR  SHARI 07/18/17 0935 Done    Acceptance E VU,NR  GERA 07/17/17 1310 Done               Point: Precautions (Done)    Learning Progress Summary    Learner Readiness Method Response Comment Documented by Status   Patient Acceptance E VU,NR  SHARI 07/18/17 0935 Done    Acceptance E DEEJAY,NR  GERA 07/17/17 1310 Done                      User Key     Initials Effective Dates Name Provider Type Discipline    SHARI 04/24/15 -  Sai Stock, PTA Physical Therapy Assistant PT    GERA 05/08/17 -  Reggie Flowers, PT Student PT Student PT                    PT Recommendation and Plan  Anticipated Equipment Needs At Discharge: walker  Anticipated Discharge Disposition: inpatient rehabilitation facility, home with home health  Planned Therapy Interventions: balance training, gait training, home exercise program, patient/family education, strengthening, transfer training  PT Frequency: daily  Plan of Care Review  Plan Of Care Reviewed With: patient  Outcome Summary/Follow up Plan: Pt with improved upright stability, able to ambulate ~8ft.  Emphasis was placed on \"lift/lower\" technique rather than \"hop\" to inhibit traumatic impact(s) to (R)foot.  HEP reviewed, no new concerns at this time.           Outcome Measures       07/18/17 0900 07/17/17 1300       How much help from another person do you currently need...    Turning from your back to your side while in flat bed without using bedrails? 4  -SHARI 4  -KH (r) GERA (t) KH (c)     Moving from lying on back to sitting on the side of a flat bed without bedrails? 3  -SHARI 4  -KH (r) GERA (t) KH (c)     Moving to and from a bed to a chair (including a wheelchair)? 3  -SHARI 3  -KH (r) GERA (t) KH (c)     Standing up from a chair using your arms (e.g., wheelchair, bedside chair)? 3  -SHARI 3  -KH (r) GERA (t) KH (c)     " Climbing 3-5 steps with a railing? 1  -SHARI 1  -ARMANI (r) GERA (t) ARMANI (c)     To walk in hospital room? 3  -SHARI 2  -ARMANI (r) GERA (t) ARMANI (c)     AM-PAC 6 Clicks Score 17  -SHARI 17  -ARMANI (r) GERA (t)     Functional Assessment    Outcome Measure Options AM-PAC 6 Clicks Basic Mobility (PT)  -SHARI AM-PAC 6 Clicks Basic Mobility (PT)  -ARMANI (r) GERA (t) ARMANI (c)       User Key  (r) = Recorded By, (t) = Taken By, (c) = Cosigned By    Initials Name Provider Type    ARMANI Sifuentes, PT Physical Therapist    SHARI Stock, QUINTON Physical Therapy Assistant    GERA Flowers, PT Student PT Student           Time Calculation:         PT Charges       07/18/17 0934          Time Calculation    Start Time 0911  -SHARI      Stop Time 0934  -SHARI      Time Calculation (min) 23 min  -SHARI      PT Received On 07/18/17  -SHARI      PT - Next Appointment 07/19/17  -SHARI        User Key  (r) = Recorded By, (t) = Taken By, (c) = Cosigned By    Initials Name Provider Type    SHARI Stock PTA Physical Therapy Assistant          Therapy Charges for Today     Code Description Service Date Service Provider Modifiers Qty    [id number] HC PT THER PROC EA 15 MIN 7/18/2017 Sai Stock PTA GP 2          PT G-Codes  Outcome Measure Options: AM-PAC 6 Clicks Basic Mobility (PT)    Sai Stock PTA  7/18/2017

## 2017-07-18 NOTE — PROGRESS NOTES
Name: Jay Blackwood ADMIT: 2017   : 1960  PCP: JOELLE Pak    MRN: [id number] LOS: 4 days   AGE/SEX: 57 y.o. male  ROOM: [facility]     Subjective     HPI: 57 y.o. male postop day #4 status post left AKA revision for chronic refractory osteomyelitis patient complains of pain. Continues to complain of pain.  Hgb 7.7 now.    Review of Systems    Objective     Vital Signs  Temp:  [98.5 °F (36.9 °C)-99.3 °F (37.4 °C)] 98.5 °F (36.9 °C)  Heart Rate:  [64-74] 64  Resp:  [12-20] 18  BP: (108-150)/(55-74) 132/57    I/O this shift:  In: 100 [P.O.:100]  Out: 450 [Urine:450]    Physical Exam  Vascular: Dressing removed.  Mild to moderate edema.  No drainage.  Staple line looks good without evidence of skin edge necrosis      Results from last 7 days  Lab Units 17  0452 17  0458 17  0518 07/15/17  0439   WBC 10*3/mm3 7.75 8.85 7.62 11.23*   HEMOGLOBIN g/dL 7.7* 8.2* 9.0* 6.5*   PLATELETS 10*3/mm3 139* 133* 152 167       Results from last 7 days  Lab Units 17  0452 17  0458 17  2333 1718 07/15/17  0439   SODIUM mmol/L 137 137  --  138 133*   POTASSIUM mmol/L 4.1 4.4 4.4 4.3 4.4   CHLORIDE mmol/L 107 111*  --  109* 100   CO2 mmol/L 14.1* 13.3*  --  14.9* 18.4*   BUN mg/dL 42* 41*  --  33* 35*   CREATININE mg/dL 4.23* 4.14*  --  3.63* 3.14*   GLUCOSE mg/dL 83 95  --  130* 291*   Estimated Creatinine Clearance: 26.7 mL/min (by C-G formula based on Cr of 4.23).      [place], Post Op Global      Assessment/Plan     Active Hospital Problems (** Indicates Principal Problem)    Diagnosis Date Noted   • **Chronic osteomyelitis of left femur [M86.652] 2017   • Chronic osteomyelitis [M86.60] 2017   • A-fib [I48.91] 10/26/2016   • Diabetes mellitus [E11.9] 10/26/2016   • PVD (peripheral vascular disease) [I73.9] 10/26/2016      Resolved Hospital Problems    Diagnosis Date Noted Date Resolved   No resolved problems to display.        Assessment & Plan  57  y.o. male postop day #4 status post revision of left AKA stump for chronic refractory osteomellitus.      Postop course has been complicated by blood loss requiring transfusion of 3 units of blood.  Hgb 7.7. Only needs unit if renal thinks it would help...    Vancomycin per infectious disease.  Complicated by acute kidney injury on chronic kidney disease    Acute kidney injury on chronic kidney disease: Creatinine up to 4.2 from 4.1 today.  Appreciate renal's assistance.    OK to start heparin - no initial bolus    He has multiple medical issues that are chronic in nature but some of these are acutely worse.  We'll need to get a better handle on this before we can consider disposition.      Mark Hawthorne MD  07/18/17  1:20 PM  Office Number [phone]

## 2017-07-18 NOTE — PROGRESS NOTES
LOS: 4 days   Patient Care Team:  JOELLE Pak as PCP - General (Family Medicine)  Prosper Oh MD as Consulting Physician (Cardiology)    Chief Complaint: Follow-up for paroxysmal atrial fibrillation.      Interval History: No further atrial fibrillation since yesterday AM.  No CP or SOA.  Having pain at surgical site.      Vital Signs:  Temp:  [98.5 °F (36.9 °C)-100.2 °F (37.9 °C)] 99.3 °F (37.4 °C)  Heart Rate:  [67-77] 74  Resp:  [12-20] 18  BP: (108-150)/(55-74) 150/63    Intake/Output Summary (Last 24 hours) at 07/18/17 0832  Last data filed at 07/18/17 0637   Gross per 24 hour   Intake              480 ml   Output             1200 ml   Net             -720 ml       Physical Exam:   General Appearance:    No acute distress, alert and oriented x4   Lungs:     Clear to auscultation bilaterally     Heart:    Regular rhythm and normal rate.  No murmurs, gallops, or       rubs.   Abdomen:     Soft, non-tender, non-distended.    Extremities:   Status post left AKA     Results Review:      Results from last 7 days  Lab Units 07/18/17  0452   SODIUM mmol/L 137   POTASSIUM mmol/L 4.1   CHLORIDE mmol/L 107   CO2 mmol/L 14.1*   BUN mg/dL 42*   CREATININE mg/dL 4.23*   GLUCOSE mg/dL 83   CALCIUM mg/dL 7.7*       Results from last 7 days  Lab Units 07/16/17  2333   TROPONIN T ng/mL 0.047*       Results from last 7 days  Lab Units 07/18/17  0452   WBC 10*3/mm3 7.75   HEMOGLOBIN g/dL 7.7*   HEMATOCRIT % 24.1*   PLATELETS 10*3/mm3 139*               Results from last 7 days  Lab Units 07/18/17  0452   MAGNESIUM mg/dL 1.9           I reviewed the patient's new clinical results.        Assessment:  1.  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 Acute blood loss anemia post-op (requiring transfusion)  4. Acute on chronic kidney injury  5. Paroxysmal atrial fibrillation  6. Coronary artery disease with prior stenting   7. EF 40-45% by echo 11/16  8.  PVD with carotid disease  9. Diabetes     Plan:  -Continue Coreg 3.125 bid.  Discontinue Diltiazem gtt.   -Still anemic and Hgb 7.7 currently - bleeding issues post-op requiring transfusion.  Start anticoagulation only when ok with surgery.  When appropriate, would recommend Heparin drip given significant kidney injury currently.  -Will follow.  Thanks     Romeo Agee MD  07/18/17  8:32 AM

## 2017-07-18 NOTE — PLAN OF CARE
Problem: Fall Risk (Adult)  Goal: Identify Related Risk Factors and Signs and Symptoms  Outcome: Ongoing (interventions implemented as appropriate)  Goal: Absence of Falls  Outcome: Ongoing (interventions implemented as appropriate)    Problem: Pain, Acute (Adult)  Goal: Identify Related Risk Factors and Signs and Symptoms  Outcome: Ongoing (interventions implemented as appropriate)  Goal: Acceptable Pain Control/Comfort Level  Outcome: Ongoing (interventions implemented as appropriate)

## 2017-07-19 ENCOUNTER — APPOINTMENT (OUTPATIENT)
Dept: GENERAL RADIOLOGY | Facility: HOSPITAL | Age: 57
End: 2017-07-19

## 2017-07-19 ENCOUNTER — APPOINTMENT (OUTPATIENT)
Dept: CT IMAGING | Facility: HOSPITAL | Age: 57
End: 2017-07-19
Attending: INTERNAL MEDICINE

## 2017-07-19 LAB
ABO + RH BLD: NORMAL
ALBUMIN SERPL-MCNC: 2.2 G/DL (ref 3.5–5.2)
ALBUMIN/GLOB SERPL: 0.7 G/DL
ALP SERPL-CCNC: 77 U/L (ref 39–117)
ALT SERPL W P-5'-P-CCNC: 6 U/L (ref 1–41)
ANION GAP SERPL CALCULATED.3IONS-SCNC: 16.1 MMOL/L
APTT PPP: 41.2 SECONDS (ref 22.7–35.4)
APTT PPP: 43.9 SECONDS (ref 22.7–35.4)
APTT PPP: 54.5 SECONDS (ref 22.7–35.4)
APTT PPP: 59.3 SECONDS (ref 22.7–35.4)
ARTERIAL PATENCY WRIST A: POSITIVE
ARTERIAL PATENCY WRIST A: POSITIVE
AST SERPL-CCNC: 16 U/L (ref 1–40)
ATMOSPHERIC PRESS: 751 MMHG
ATMOSPHERIC PRESS: 753.4 MMHG
BACTERIA UR QL AUTO: ABNORMAL /HPF
BASE EXCESS BLDA CALC-SCNC: -11.6 MMOL/L (ref 0–2)
BASE EXCESS BLDA CALC-SCNC: -9.8 MMOL/L (ref 0–2)
BASOPHILS # BLD AUTO: 0.01 10*3/MM3 (ref 0–0.2)
BASOPHILS # BLD AUTO: 0.01 10*3/MM3 (ref 0–0.2)
BASOPHILS NFR BLD AUTO: 0.1 % (ref 0–1.5)
BASOPHILS NFR BLD AUTO: 0.1 % (ref 0–1.5)
BDY SITE: ABNORMAL
BDY SITE: ABNORMAL
BH BB BLOOD EXPIRATION DATE: NORMAL
BH BB BLOOD TYPE BARCODE: 600
BH BB DISPENSE STATUS: NORMAL
BH BB PRODUCT CODE: NORMAL
BH BB UNIT NUMBER: NORMAL
BILIRUB SERPL-MCNC: 0.2 MG/DL (ref 0.1–1.2)
BILIRUB UR QL STRIP: NEGATIVE
BUN BLD-MCNC: 41 MG/DL (ref 6–20)
BUN/CREAT SERPL: 9.6 (ref 7–25)
CALCIUM SPEC-SCNC: 7.6 MG/DL (ref 8.6–10.5)
CHLORIDE SERPL-SCNC: 110 MMOL/L (ref 98–107)
CLARITY UR: CLEAR
CO2 SERPL-SCNC: 13.9 MMOL/L (ref 22–29)
COLOR UR: YELLOW
CREAT BLD-MCNC: 4.28 MG/DL (ref 0.76–1.27)
DEPRECATED RDW RBC AUTO: 55.3 FL (ref 37–54)
DEPRECATED RDW RBC AUTO: 55.6 FL (ref 37–54)
EOSINOPHIL # BLD AUTO: 0.07 10*3/MM3 (ref 0–0.7)
EOSINOPHIL # BLD AUTO: 0.22 10*3/MM3 (ref 0–0.7)
EOSINOPHIL NFR BLD AUTO: 0.6 % (ref 0.3–6.2)
EOSINOPHIL NFR BLD AUTO: 1.8 % (ref 0.3–6.2)
ERYTHROCYTE [DISTWIDTH] IN BLOOD BY AUTOMATED COUNT: 15.9 % (ref 11.5–14.5)
ERYTHROCYTE [DISTWIDTH] IN BLOOD BY AUTOMATED COUNT: 16.1 % (ref 11.5–14.5)
GAS FLOW AIRWAY: 3.5 LPM
GAS FLOW AIRWAY: 6 LPM
GFR SERPL CREATININE-BSD FRML MDRD: 14 ML/MIN/1.73
GLOBULIN UR ELPH-MCNC: 3.2 GM/DL
GLUCOSE BLD-MCNC: 113 MG/DL (ref 65–99)
GLUCOSE BLDC GLUCOMTR-MCNC: 111 MG/DL (ref 70–130)
GLUCOSE BLDC GLUCOMTR-MCNC: 136 MG/DL (ref 70–130)
GLUCOSE BLDC GLUCOMTR-MCNC: 138 MG/DL (ref 70–130)
GLUCOSE BLDC GLUCOMTR-MCNC: 163 MG/DL (ref 70–130)
GLUCOSE UR STRIP-MCNC: NEGATIVE MG/DL
GRAN CASTS URNS QL MICRO: ABNORMAL /LPF
HCO3 BLDA-SCNC: 14.8 MMOL/L (ref 22–28)
HCO3 BLDA-SCNC: 15.3 MMOL/L (ref 22–28)
HCT VFR BLD AUTO: 26.8 % (ref 40.4–52.2)
HCT VFR BLD AUTO: 29 % (ref 40.4–52.2)
HGB BLD-MCNC: 9.2 G/DL (ref 13.7–17.6)
HGB BLD-MCNC: 9.4 G/DL (ref 13.7–17.6)
HGB UR QL STRIP.AUTO: ABNORMAL
HYALINE CASTS UR QL AUTO: ABNORMAL /LPF
IMM GRANULOCYTES # BLD: 0.02 10*3/MM3 (ref 0–0.03)
IMM GRANULOCYTES # BLD: 0.06 10*3/MM3 (ref 0–0.03)
IMM GRANULOCYTES NFR BLD: 0.2 % (ref 0–0.5)
IMM GRANULOCYTES NFR BLD: 0.5 % (ref 0–0.5)
INR PPP: 1.18 (ref 0.9–1.1)
KETONES UR QL STRIP: NEGATIVE
LEUKOCYTE ESTERASE UR QL STRIP.AUTO: NEGATIVE
LYMPHOCYTES # BLD AUTO: 0.49 10*3/MM3 (ref 0.9–4.8)
LYMPHOCYTES # BLD AUTO: 0.57 10*3/MM3 (ref 0.9–4.8)
LYMPHOCYTES NFR BLD AUTO: 4.1 % (ref 19.6–45.3)
LYMPHOCYTES NFR BLD AUTO: 4.6 % (ref 19.6–45.3)
MCH RBC QN AUTO: 31 PG (ref 27–32.7)
MCH RBC QN AUTO: 32.2 PG (ref 27–32.7)
MCHC RBC AUTO-ENTMCNC: 32.4 G/DL (ref 32.6–36.4)
MCHC RBC AUTO-ENTMCNC: 34.3 G/DL (ref 32.6–36.4)
MCV RBC AUTO: 93.7 FL (ref 79.8–96.2)
MCV RBC AUTO: 95.7 FL (ref 79.8–96.2)
MODALITY: ABNORMAL
MODALITY: ABNORMAL
MONOCYTES # BLD AUTO: 0.94 10*3/MM3 (ref 0.2–1.2)
MONOCYTES # BLD AUTO: 1.19 10*3/MM3 (ref 0.2–1.2)
MONOCYTES NFR BLD AUTO: 7.8 % (ref 5–12)
MONOCYTES NFR BLD AUTO: 9.7 % (ref 5–12)
NEUTROPHILS # BLD AUTO: 10.34 10*3/MM3 (ref 1.9–8.1)
NEUTROPHILS # BLD AUTO: 10.43 10*3/MM3 (ref 1.9–8.1)
NEUTROPHILS NFR BLD AUTO: 84.5 % (ref 42.7–76)
NEUTROPHILS NFR BLD AUTO: 86 % (ref 42.7–76)
NITRITE UR QL STRIP: NEGATIVE
PCO2 BLDA: 29.9 MM HG (ref 35–45)
PCO2 BLDA: 34.6 MM HG (ref 35–45)
PH BLDA: 7.24 PH UNITS (ref 7.35–7.45)
PH BLDA: 7.32 PH UNITS (ref 7.35–7.45)
PH UR STRIP.AUTO: <=5 [PH] (ref 5–8)
PLATELET # BLD AUTO: 147 10*3/MM3 (ref 140–500)
PLATELET # BLD AUTO: 150 10*3/MM3 (ref 140–500)
PMV BLD AUTO: 9.4 FL (ref 6–12)
PMV BLD AUTO: 9.5 FL (ref 6–12)
PO2 BLDA: 48.3 MM HG (ref 80–100)
PO2 BLDA: 65.8 MM HG (ref 80–100)
POTASSIUM BLD-SCNC: 4.3 MMOL/L (ref 3.5–5.2)
PROT SERPL-MCNC: 5.4 G/DL (ref 6–8.5)
PROT UR QL STRIP: ABNORMAL
PROTHROMBIN TIME: 14.6 SECONDS (ref 11.7–14.2)
RBC # BLD AUTO: 2.86 10*6/MM3 (ref 4.6–6)
RBC # BLD AUTO: 3.03 10*6/MM3 (ref 4.6–6)
RBC # UR: ABNORMAL /HPF
REF LAB TEST METHOD: ABNORMAL
SAO2 % BLDCOA: 81.1 % (ref 92–99)
SAO2 % BLDCOA: 89.1 % (ref 92–99)
SODIUM BLD-SCNC: 140 MMOL/L (ref 136–145)
SP GR UR STRIP: 1.01 (ref 1–1.03)
SQUAMOUS #/AREA URNS HPF: ABNORMAL /HPF
TOTAL RATE: 20 BREATHS/MINUTE
TOTAL RATE: 24 BREATHS/MINUTE
UNIT  ABO: NORMAL
UNIT  RH: NORMAL
UROBILINOGEN UR QL STRIP: ABNORMAL
VANCOMYCIN SERPL-MCNC: 19.9 MCG/ML (ref 5–40)
WBC NRBC COR # BLD: 12.02 10*3/MM3 (ref 4.5–10.7)
WBC NRBC COR # BLD: 12.33 10*3/MM3 (ref 4.5–10.7)
WBC UR QL AUTO: ABNORMAL /HPF

## 2017-07-19 PROCEDURE — 25010000002 NALOXONE PER 1 MG: Performed by: SURGERY

## 2017-07-19 PROCEDURE — 36600 WITHDRAWAL OF ARTERIAL BLOOD: CPT

## 2017-07-19 PROCEDURE — 80053 COMPREHEN METABOLIC PANEL: CPT | Performed by: INTERNAL MEDICINE

## 2017-07-19 PROCEDURE — 71250 CT THORAX DX C-: CPT

## 2017-07-19 PROCEDURE — 99233 SBSQ HOSP IP/OBS HIGH 50: CPT | Performed by: INTERNAL MEDICINE

## 2017-07-19 PROCEDURE — 85730 THROMBOPLASTIN TIME PARTIAL: CPT | Performed by: SURGERY

## 2017-07-19 PROCEDURE — 87040 BLOOD CULTURE FOR BACTERIA: CPT | Performed by: INTERNAL MEDICINE

## 2017-07-19 PROCEDURE — 82803 BLOOD GASES ANY COMBINATION: CPT

## 2017-07-19 PROCEDURE — 25010000002 HEPARIN (PORCINE) PER 1000 UNITS: Performed by: SURGERY

## 2017-07-19 PROCEDURE — 85025 COMPLETE CBC W/AUTO DIFF WBC: CPT | Performed by: SURGERY

## 2017-07-19 PROCEDURE — 80202 ASSAY OF VANCOMYCIN: CPT | Performed by: SURGERY

## 2017-07-19 PROCEDURE — 71010 HC CHEST PA OR AP: CPT

## 2017-07-19 PROCEDURE — 81001 URINALYSIS AUTO W/SCOPE: CPT | Performed by: INTERNAL MEDICINE

## 2017-07-19 PROCEDURE — 94660 CPAP INITIATION&MGMT: CPT

## 2017-07-19 PROCEDURE — 94799 UNLISTED PULMONARY SVC/PX: CPT

## 2017-07-19 PROCEDURE — 25010000002 VANCOMYCIN: Performed by: SURGERY

## 2017-07-19 PROCEDURE — 82962 GLUCOSE BLOOD TEST: CPT

## 2017-07-19 RX ORDER — SODIUM BICARBONATE 650 MG/1
1300 TABLET ORAL 4 TIMES DAILY
Status: DISCONTINUED | OUTPATIENT
Start: 2017-07-19 | End: 2017-07-21

## 2017-07-19 RX ADMIN — HEPARIN SODIUM 16 UNITS/KG/HR: 10000 INJECTION, SOLUTION INTRAVENOUS at 13:56

## 2017-07-19 RX ADMIN — OXYCODONE HYDROCHLORIDE AND ACETAMINOPHEN 1 TABLET: 5; 325 TABLET ORAL at 03:04

## 2017-07-19 RX ADMIN — CARVEDILOL 3.12 MG: 3.12 TABLET, FILM COATED ORAL at 08:15

## 2017-07-19 RX ADMIN — ATORVASTATIN CALCIUM 40 MG: 20 TABLET, FILM COATED ORAL at 08:16

## 2017-07-19 RX ADMIN — OXYCODONE HYDROCHLORIDE AND ACETAMINOPHEN 1 TABLET: 5; 325 TABLET ORAL at 15:19

## 2017-07-19 RX ADMIN — HEPARIN SODIUM 17.2 UNITS/KG/HR: 10000 INJECTION, SOLUTION INTRAVENOUS at 15:23

## 2017-07-19 RX ADMIN — FAMOTIDINE 20 MG: 20 TABLET, FILM COATED ORAL at 08:15

## 2017-07-19 RX ADMIN — SODIUM BICARBONATE 1300 MG: 650 TABLET ORAL at 18:23

## 2017-07-19 RX ADMIN — SERTRALINE 100 MG: 100 TABLET, FILM COATED ORAL at 08:15

## 2017-07-19 RX ADMIN — HEPARIN SODIUM 18.2 UNITS/KG/HR: 10000 INJECTION, SOLUTION INTRAVENOUS at 23:51

## 2017-07-19 RX ADMIN — OXYCODONE HYDROCHLORIDE AND ACETAMINOPHEN 1 TABLET: 5; 325 TABLET ORAL at 21:28

## 2017-07-19 RX ADMIN — VANCOMYCIN HYDROCHLORIDE 500 MG: 500 INJECTION, POWDER, LYOPHILIZED, FOR SOLUTION INTRAVENOUS at 15:18

## 2017-07-19 RX ADMIN — SODIUM BICARBONATE 1300 MG: 650 TABLET ORAL at 20:19

## 2017-07-19 RX ADMIN — OXYCODONE HYDROCHLORIDE AND ACETAMINOPHEN 1 TABLET: 5; 325 TABLET ORAL at 07:24

## 2017-07-19 RX ADMIN — CARVEDILOL 3.12 MG: 3.12 TABLET, FILM COATED ORAL at 20:19

## 2017-07-19 RX ADMIN — NALOXONE HYDROCHLORIDE 0.4 MG: 0.4 INJECTION, SOLUTION INTRAMUSCULAR; INTRAVENOUS; SUBCUTANEOUS at 00:42

## 2017-07-19 RX ADMIN — TAMSULOSIN HYDROCHLORIDE 0.8 MG: 0.4 CAPSULE ORAL at 08:15

## 2017-07-19 RX ADMIN — SODIUM BICARBONATE 1300 MG: 650 TABLET ORAL at 08:15

## 2017-07-19 RX ADMIN — HEPARIN SODIUM 13.2 UNITS/KG/HR: 10000 INJECTION, SOLUTION INTRAVENOUS at 01:16

## 2017-07-19 RX ADMIN — NICOTINE 1 PATCH: 14 PATCH, EXTENDED RELEASE TRANSDERMAL at 22:33

## 2017-07-19 RX ADMIN — GABAPENTIN 200 MG: 100 CAPSULE ORAL at 08:16

## 2017-07-19 RX ADMIN — HEPARIN SODIUM 16.2 UNITS/KG/HR: 10000 INJECTION, SOLUTION INTRAVENOUS at 07:26

## 2017-07-19 RX ADMIN — ASPIRIN 81 MG: 81 TABLET ORAL at 08:16

## 2017-07-19 RX ADMIN — VITAMIN D, TAB 1000IU (100/BT) 5000 UNITS: 25 TAB at 08:14

## 2017-07-19 NOTE — PROGRESS NOTES
INFECTIOUS DISEASES PROGRESS NOTE    CC: f/u MRSA stump infection    S: Febrile, overnight was oversedated and required Narcan since that time he has been on high flow oxygen.  Patient reports a dry cough but states he does not feel short of breath.  No rhinorrhea or sore throat.  No abdominal pain nausea vomiting or diarrhea.  Tolerating antibiotics without a rash.  Continues to report stump pain.    O:  Physical Exam:  Temp:  [96.7 °F (35.9 °C)-102.1 °F (38.9 °C)] 96.7 °F (35.9 °C)  Heart Rate:  [62-83] 63  Resp:  [16-20] 20  BP: (150-183)/(56-84) 165/73   96% 6L HF    Physical Exam   No apparent distress  Regular rate and rhythm no lower extremity edema   Soft nontender nondistended  Left stump warm to the touch, upper thigh with a blister lesion, incision without purulence, some swelling present  No rashes    Diagnostics:    Lab Results   Component Value Date    WBC 12.33 (H) 07/19/2017    HGB 9.2 (L) 07/19/2017    HCT 26.8 (L) 07/19/2017    MCV 93.7 07/19/2017     07/19/2017     Lab Results   Component Value Date    GLUCOSE 113 (H) 07/19/2017    BUN 41 (H) 07/19/2017    CREATININE 4.28 (H) 07/19/2017    EGFRIFNONA 14 (L) 07/19/2017    EGFRIFAFRI  09/15/2016      Comment:      <15 Indicative of kidney failure.    BCR 9.6 07/19/2017    K 4.3 07/19/2017    CO2 13.9 (L) 07/19/2017    CALCIUM 7.6 (L) 07/19/2017    ALBUMIN 2.20 (L) 07/19/2017    LABIL2 0.7 07/19/2017    AST 16 07/19/2017    ALT 6 07/19/2017     Microbiology:  7/16 C diff negative  7/16 BCx NGTDx2  6/16 Wound cx MRSA    Assessment/Plan   1. Left stump osteomyelitis with abscess status post revision of the left above-the-knee amputation on 7/14. Prior wound cultures with MRSA  - continue vancomycin dosing per pharmacy  - Abx stop date was August 25, 2017    2.  Fever etiology unclear  - Check blood cultures ×2  - If the patient continues to have fever we'll considering adding gram-negative coverage with cefepime  - If he continues to have fever  without an unknown source we'll repeat CAT scan of his left lower extremity    3.  Acute hypoxic respiratory failure  - Chest x-ray concerning for possible pneumonia  - Obtain a CAT scan of the chest is positive for pneumonia will add gram-negative coverage with cefepime     4. Type 2 diabetes complicating above     5. Peripheral vascular disease complicating above     6. Acute on CKD III, worse  - Nephrology following        Eugenia Jung MD  8:03 AM  07/19/17

## 2017-07-19 NOTE — CONSULTS
"CC: Paroxysmal atrial fibrillation.    Interval History:   No complaints of chest pain or dyspnea.    Vital Signs  Temp:  [97 °F (36.1 °C)-102.1 °F (38.9 °C)] 99.8 °F (37.7 °C)  Heart Rate:  [61-83] 64  Resp:  [16-20] 16  BP: (132-183)/(56-84) 150/59    Intake/Output Summary (Last 24 hours) at 07/19/17 0712  Last data filed at 07/19/17 0633   Gross per 24 hour   Intake           630.84 ml   Output             2500 ml   Net         -1869.16 ml     Flowsheet Rows         First Filed Value    Admission Height  74\" (188 cm) Documented at 07/14/2017 1002    Admission Weight  220 lb (99.8 kg) Documented at 07/14/2017 1002          PHYSICAL EXAM:  General: No acute distress  Resp:NL Rate, unlabored, clear  CV:NL rate and rhythm, NL PMI, Nl S1 and S2, no Mumur, no gallop, no rub, No JVD. Normal pedal pulses On the right  ABD:Nl sounds, no masses or tenderness, nondistended, no quarding or rebound  Neuro: alert,cooperative and oriented  Extr: No edema or cyanosis, moves all extremitiesLeft above-the-knee amputation      Results Review:      Results from last 7 days  Lab Units 07/19/17  0603   SODIUM mmol/L 140   POTASSIUM mmol/L 4.3   CHLORIDE mmol/L 110*   CO2 mmol/L 13.9*   BUN mg/dL 41*   CREATININE mg/dL 4.28*   GLUCOSE mg/dL 113*   CALCIUM mg/dL 7.6*       Results from last 7 days  Lab Units 07/16/17  2333   TROPONIN T ng/mL 0.047*       Results from last 7 days  Lab Units 07/19/17  0603   WBC 10*3/mm3 12.33*   HEMOGLOBIN g/dL 9.2*   HEMATOCRIT % 26.8*   PLATELETS 10*3/mm3 150       Results from last 7 days  Lab Units 07/19/17  0603 07/18/17  2350   INR   --  1.18*   APTT seconds 43.9* 41.2*           Results from last 7 days  Lab Units 07/18/17  0452   MAGNESIUM mg/dL 1.9         @LABRCNT(bnp)@  I reviewed the patient's new clinical results.  I personally viewed and interpreted the patient's EKG/Telemetry data        Medication Review:   Meds reviewed      heparin (porcine) 10.2 Units/kg/hr Last Rate: 13.2 Units/kg/hr " (07/19/17 0116)   Pharmacy to dose vancomycin         Assessment/Plan  1.  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 Restart eliquis when okay with surgery.  2.  Acute blood loss anemia post-op (requiring transfusion)  3  Acute on chronic kidney injury  4.  Paroxysmal atrial fibrillation.  In and out of atrial fibrillation continue beta blocker.  Restart eliquis when okay with surgery.  5.  Coronary artery disease with prior stenting , LV EF 40-45% by echo 11/16.  No angina or heart failure continue same  6.  PVD with carotid disease  7.  Diabetes     I believe he follows with [facility] cardiology.        Mikey Hurt MD  07/19/17  7:12 AM

## 2017-07-19 NOTE — PROGRESS NOTES
"   LOS: 5 days    Patient Care Team:  JOELLE Pak as PCP - General (Family Medicine)  Prosper Oh MD as Consulting Physician (Cardiology)    Chief Complaint:  No chief complaint on file.      Subjective  Follow up KRISTA on CKD IV.      Interval History:   Just back from CT chest.  Febrile last night.  Pain left AKA stump.  Blister flatter.  Eating poorly.       Objective     Vital Signs  Temp:  [96.7 °F (35.9 °C)-102.1 °F (38.9 °C)] 96.7 °F (35.9 °C)  Heart Rate:  [61-83] 63  Resp:  [16-20] 20  BP: (135-183)/(56-84) 165/73    Flowsheet Rows         First Filed Value    Admission Height  74\" (188 cm) Documented at 07/14/2017 1002    Admission Weight  220 lb (99.8 kg) Documented at 07/14/2017 1002          I/O this shift:  In: 240 [P.O.:240]  Out: 800 [Urine:800]  I/O last 3 completed shifts:  In: 990.8 [P.O.:700; I.V.:18.2; Blood:272.7]  Out: 3300 [Urine:3300]    Intake/Output Summary (Last 24 hours) at 07/19/17 1440  Last data filed at 07/19/17 1346   Gross per 24 hour   Intake           770.84 ml   Output             2850 ml   Net         -2079.16 ml       Physical Exam:  General Appearance: awake, but falls asleep easily.   Skin: warm and dry  Neck: supple, no JVD, trachea midline  Lungs: Decreased bs left base.   Heart: RRR, normal S1 and S2, no S3, no rub  Abdomen: soft, non-tender, + bs  Extremities: 1+ edema of the right leg, Left AKA staples.  Linear blister  Flat.        Results Review:      Results from last 7 days  Lab Units 07/19/17  0603 07/18/17  0452 07/17/17  0458   SODIUM mmol/L 140 137 137   POTASSIUM mmol/L 4.3 4.1 4.4   CHLORIDE mmol/L 110* 107 111*   CO2 mmol/L 13.9* 14.1* 13.3*   BUN mg/dL 41* 42* 41*   CREATININE mg/dL 4.28* 4.23* 4.14*   CALCIUM mg/dL 7.6* 7.7* 7.7*   BILIRUBIN mg/dL 0.2  --   --    ALK PHOS U/L 77  --   --    ALT (SGPT) U/L 6  --   --    AST (SGOT) U/L 16  --   --    GLUCOSE mg/dL 113* 83 95       Estimated Creatinine Clearance: 26.1 mL/min (by C-G formula " based on Cr of 4.28).      Results from last 7 days  Lab Units 07/18/17  0452 07/17/17  0458 07/16/17  2333   MAGNESIUM mg/dL 1.9 1.7 1.6   PHOSPHORUS mg/dL 5.4* 4.9*  --          Results from last 7 days  Lab Units 07/18/17  0452   URIC ACID mg/dL 9.4*         Results from last 7 days  Lab Units 07/19/17  0603 07/18/17  2350 07/18/17  0452 07/17/17  0458 07/16/17  0518   WBC 10*3/mm3 12.33* 12.02* 7.75 8.85 7.62   HEMOGLOBIN g/dL 9.2* 9.4* 7.7* 8.2* 9.0*   PLATELETS 10*3/mm3 150 147 139* 133* 152         Results from last 7 days  Lab Units 07/18/17  2350   INR  1.18*         Imaging Results (last 24 hours)     Procedure Component Value Units Date/Time    XR Chest 1 View [[id number]] Collected:  07/19/17 0139     Updated:  07/19/17 0144    Narrative:       PORTABLE CHEST X-RAY     CLINICAL HISTORY: shortness of breath; R26.89-Other abnormalities of  gait and mobility; M86.60-Other chronic osteomyelitis, unspecified site     COMPARISON: 09/15/2016.     FINDINGS: Portable AP view of the chest was obtained with overlying  monitor leads in place. The lungs are fairly well inflated. Right  diaphragm is elevated, unchanged. There are some mild fibrotic changes  felt to be chronic. Hazy opacities in the left lung base, this may be  related to an area of atelectasis, pneumonia, possibly small left  effusion. Right lung is clear. Heart size and vascularity are felt to be  normal considering technique. A right-sided PICC line has been placed  and extends near the level of the right brachiocephalic-SVC junction.       Impression:       Left lung base opacity as discussed. Follow-up suggested        This report was finalized on 7/19/2017 1:41 AM by Raul Thomas MD.       CT Chest Without Contrast [[id number]] Updated:  07/19/17 1434          aspirin 81 mg Oral Daily   atorvastatin 40 mg Oral Daily   carvedilol 3.125 mg Oral Q12H   cholecalciferol 5,000 Units Oral Daily   famotidine 20 mg Intravenous Daily   Or      famotidine  20 mg Oral Daily   insulin aspart 0-9 Units Subcutaneous 4x Daily With Meals & Nightly   nicotine 1 patch Transdermal Q24H   sertraline 100 mg Oral Daily   sodium bicarbonate 1,300 mg Oral 4x Daily   tamsulosin 0.8 mg Oral Daily   vancomycin 500 mg Intravenous Once   Vancomycin Pharmacy Intermittent Dosing  Does not apply Daily       heparin (porcine) 10.2 Units/kg/hr Last Rate: 16 Units/kg/hr (07/19/17 1356)   Pharmacy to dose vancomycin         Medication Review:   Current Facility-Administered Medications   Medication Dose Route Frequency Provider Last Rate Last Dose   • aspirin EC tablet 81 mg  81 mg Oral Daily Mark Hawthorne MD   81 mg at 07/19/17 0816   • atorvastatin (LIPITOR) tablet 40 mg  40 mg Oral Daily Mark Hawthorne MD   40 mg at 07/19/17 0816   • carvedilol (COREG) tablet 3.125 mg  3.125 mg Oral Q12H Romeo Agee MD   3.125 mg at 07/19/17 0815   • cholecalciferol (VITAMIN D3) tablet 5,000 Units  5,000 Units Oral Daily Heidi Medel MD   5,000 Units at 07/19/17 0814   • dextrose (D50W) solution 25 g  25 g Intravenous Q15 Min PRN Mark Hawthorne MD       • dextrose (GLUTOSE) oral gel 15 g  15 g Oral Q15 Min PRN Mark Hawthorne MD       • famotidine (PEPCID) injection 20 mg  20 mg Intravenous Daily Mark Hawthorne MD        Or   • famotidine (PEPCID) tablet 20 mg  20 mg Oral Daily Mark Hawthorne MD   20 mg at 07/19/17 0815   • glucagon (human recombinant) (GLUCAGEN DIAGNOSTIC) injection 1 mg  1 mg Subcutaneous Q15 Min PRN Mark Hawthorne MD       • heparin 14930 units/250 mL (100 units/mL) in 0.45 % NaCl infusion  10.2 Units/kg/hr Intravenous Titrated Mark Hawthorne MD 15.69 mL/hr at 07/19/17 1356 16 Units/kg/hr at 07/19/17 1356   • insulin aspart (novoLOG) injection 0-9 Units  0-9 Units Subcutaneous 4x Daily With Meals & Nightly Mark Hawthorne MD   2 Units at 07/16/17 0810   • morphine injection 4 mg  4 mg Intravenous Q2H PRN  Leticia Stanton MD   4 mg at 07/15/17 1235   • naloxone (NARCAN) injection 0.4 mg  0.4 mg Intravenous Q5 Min PRN Leticia Stanton MD   0.4 mg at 07/19/17 0042   • nicotine (NICODERM CQ) 14 MG/24HR patch 1 patch  1 patch Transdermal Q24H Leticia Stanton MD   1 patch at 07/18/17 2152   • nitroglycerin (NITROSTAT) SL tablet 0.4 mg  0.4 mg Sublingual Q5 Min PRN Mark Hawthorne MD       • ondansetron (ZOFRAN) tablet 4 mg  4 mg Oral Q6H PRN Mrak Hawthorne MD        Or   • ondansetron ODT (ZOFRAN-ODT) disintegrating tablet 4 mg  4 mg Oral Q6H PRN Mark Hawthorne MD        Or   • ondansetron (ZOFRAN) injection 4 mg  4 mg Intravenous Q6H PRN Mark Hawthorne MD   4 mg at 07/16/17 1048   • oxyCODONE-acetaminophen (PERCOCET)  MG per tablet 2 tablet  2 tablet Oral Q6H PRN Mark Hawthorne MD   2 tablet at 07/18/17 1315   • oxyCODONE-acetaminophen (PERCOCET) 5-325 MG per tablet 1 tablet  1 tablet Oral Q4H PRN Mark Hawthorne MD   1 tablet at 07/19/17 0724   • Pharmacy to dose vancomycin   Does not apply Continuous PRN Mark Hawthorne MD       • sennosides-docusate sodium (SENOKOT-S) 8.6-50 MG tablet 2 tablet  2 tablet Oral BID PRN Mark Hawthorne MD       • sertraline (ZOLOFT) tablet 100 mg  100 mg Oral Daily Mark Hawthorne MD   100 mg at 07/19/17 0815   • sodium bicarbonate tablet 1,300 mg  1,300 mg Oral 4x Daily Heidi Medel MD       • tamsulosin (FLOMAX) 24 hr capsule 0.8 mg  0.8 mg Oral Daily Heidi Medel MD   0.8 mg at 07/19/17 0815   • vancomycin 500 mg/100 mL 0.9% NS IVPB (mbp)  500 mg Intravenous Once Mark Hawthorne MD       • Vancomycin Pharmacy Intermittent Dosing   Does not apply Daily Mark Hawthorne MD           Assessment/Plan   1.  Acute kidney injury on chronic kidney disease IV.  Creatinine not really changed today. Urine output excellent.    Urinary retention,  Garcia placed 7/17.  On flomax.   2. Fever with LLL  infiltrate.  CT chest now.  ID following. WBC rising. On Vanc  3.  Revision left AKA infected stump. POD 5.  4.  ABLA.  PRBC yesterday with increase in Hg.   5.  Peripheral vascular disease and carotid disease  6.  Hypotension resolved. BP now rising.   7.  Metabolic acidosis, anion gap.  On PO bicarb. Increase to qid.   8. DM2.  levemir dc yesterday. Regular diet  9.  Untreated NORY due to inability to tolerate mask.    10. Vit D deficiency.   11. PAF. On beta blocker.  Eliquis when ok with vascular.  On heparin drip for now.   12. Lethargy.  DC neurontin for now, especially with his NORY and predisposition to somnolence.     Plan:  1. Increase bicarb to qid.  IV bicarb critical shortage.   2. Consult pulmonary    Heidi Medel MD  07/19/17  2:40 PM

## 2017-07-19 NOTE — SIGNIFICANT NOTE
07/19/17 1346   Rehab Treatment   Discipline physical therapist   Treatment Not Performed patient unavailable for treatment  (Leaving the floor to CT . WIll follow up tomorrow.)   Rehab Evaluation   Evaluation Not Performed patient unavailable for evaluation   Recommendation   PT - Next Appointment 07/20/17
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
## 2017-07-19 NOTE — PLAN OF CARE
Problem: Patient Care Overview (Adult)  Goal: Plan of Care Review  Outcome: Ongoing (interventions implemented as appropriate)    07/19/17 1442   Coping/Psychosocial Response Interventions   Plan Of Care Reviewed With patient   Outcome Evaluation   Outcome Summary/Follow up Plan VSS. Pain controlled with PRN PO pain medication. O2 weaned down to 4L hiflo. Dressing changed to L AKA revision as ordered, no redness, swelling or drainage noted, staples intact. Patient t recieve vanc today per pharmacy. Continues on heparin gtt, rated adjusted per protocol. Will continue to monitor.        Goal: Adult Individualization and Mutuality  Outcome: Ongoing (interventions implemented as appropriate)    Problem: Fall Risk (Adult)  Goal: Absence of Falls  Outcome: Ongoing (interventions implemented as appropriate)    Problem: Pain, Acute (Adult)  Goal: Acceptable Pain Control/Comfort Level  Outcome: Ongoing (interventions implemented as appropriate)    Problem: Infection, Risk/Actual (Adult)  Goal: Infection Prevention/Resolution  Outcome: Ongoing (interventions implemented as appropriate)

## 2017-07-20 LAB
ANION GAP SERPL CALCULATED.3IONS-SCNC: 14.9 MMOL/L
APTT PPP: 80.3 SECONDS (ref 22.7–35.4)
BASOPHILS # BLD AUTO: 0.02 10*3/MM3 (ref 0–0.2)
BASOPHILS NFR BLD AUTO: 0.2 % (ref 0–1.5)
BUN BLD-MCNC: 42 MG/DL (ref 6–20)
BUN/CREAT SERPL: 11.1 (ref 7–25)
CALCIUM SPEC-SCNC: 7.9 MG/DL (ref 8.6–10.5)
CHLORIDE SERPL-SCNC: 108 MMOL/L (ref 98–107)
CO2 SERPL-SCNC: 17.1 MMOL/L (ref 22–29)
CREAT BLD-MCNC: 3.77 MG/DL (ref 0.76–1.27)
DEPRECATED RDW RBC AUTO: 55.7 FL (ref 37–54)
EOSINOPHIL # BLD AUTO: 0.27 10*3/MM3 (ref 0–0.7)
EOSINOPHIL NFR BLD AUTO: 2.2 % (ref 0.3–6.2)
ERYTHROCYTE [DISTWIDTH] IN BLOOD BY AUTOMATED COUNT: 16 % (ref 11.5–14.5)
GFR SERPL CREATININE-BSD FRML MDRD: 17 ML/MIN/1.73
GLUCOSE BLD-MCNC: 165 MG/DL (ref 65–99)
GLUCOSE BLDC GLUCOMTR-MCNC: 101 MG/DL (ref 70–130)
GLUCOSE BLDC GLUCOMTR-MCNC: 132 MG/DL (ref 70–130)
GLUCOSE BLDC GLUCOMTR-MCNC: 149 MG/DL (ref 70–130)
GLUCOSE BLDC GLUCOMTR-MCNC: 175 MG/DL (ref 70–130)
GLUCOSE BLDC GLUCOMTR-MCNC: 266 MG/DL (ref 70–130)
GLUCOSE BLDC GLUCOMTR-MCNC: 52 MG/DL (ref 70–130)
GLUCOSE BLDC GLUCOMTR-MCNC: 57 MG/DL (ref 70–130)
GLUCOSE BLDC GLUCOMTR-MCNC: 58 MG/DL (ref 70–130)
HCT VFR BLD AUTO: 25.3 % (ref 40.4–52.2)
HGB BLD-MCNC: 8.3 G/DL (ref 13.7–17.6)
IMM GRANULOCYTES # BLD: 0.06 10*3/MM3 (ref 0–0.03)
IMM GRANULOCYTES NFR BLD: 0.5 % (ref 0–0.5)
LYMPHOCYTES # BLD AUTO: 1.05 10*3/MM3 (ref 0.9–4.8)
LYMPHOCYTES NFR BLD AUTO: 8.6 % (ref 19.6–45.3)
MCH RBC QN AUTO: 31.2 PG (ref 27–32.7)
MCHC RBC AUTO-ENTMCNC: 32.8 G/DL (ref 32.6–36.4)
MCV RBC AUTO: 95.1 FL (ref 79.8–96.2)
MONOCYTES # BLD AUTO: 0.98 10*3/MM3 (ref 0.2–1.2)
MONOCYTES NFR BLD AUTO: 8.1 % (ref 5–12)
NEUTROPHILS # BLD AUTO: 9.78 10*3/MM3 (ref 1.9–8.1)
NEUTROPHILS NFR BLD AUTO: 80.4 % (ref 42.7–76)
PLATELET # BLD AUTO: 141 10*3/MM3 (ref 140–500)
PMV BLD AUTO: 10.1 FL (ref 6–12)
POTASSIUM BLD-SCNC: 4.1 MMOL/L (ref 3.5–5.2)
RBC # BLD AUTO: 2.66 10*6/MM3 (ref 4.6–6)
SODIUM BLD-SCNC: 140 MMOL/L (ref 136–145)
VANCOMYCIN SERPL-MCNC: 16.8 MCG/ML (ref 5–40)
WBC NRBC COR # BLD: 12.16 10*3/MM3 (ref 4.5–10.7)

## 2017-07-20 PROCEDURE — 63710000001 INSULIN ASPART PER 5 UNITS: Performed by: SURGERY

## 2017-07-20 PROCEDURE — 85025 COMPLETE CBC W/AUTO DIFF WBC: CPT | Performed by: SURGERY

## 2017-07-20 PROCEDURE — 80048 BASIC METABOLIC PNL TOTAL CA: CPT | Performed by: SURGERY

## 2017-07-20 PROCEDURE — 99232 SBSQ HOSP IP/OBS MODERATE 35: CPT | Performed by: INTERNAL MEDICINE

## 2017-07-20 PROCEDURE — 99233 SBSQ HOSP IP/OBS HIGH 50: CPT | Performed by: INTERNAL MEDICINE

## 2017-07-20 PROCEDURE — 94799 UNLISTED PULMONARY SVC/PX: CPT

## 2017-07-20 PROCEDURE — 25010000002 CEFEPIME: Performed by: INTERNAL MEDICINE

## 2017-07-20 PROCEDURE — 82962 GLUCOSE BLOOD TEST: CPT

## 2017-07-20 PROCEDURE — 92610 EVALUATE SWALLOWING FUNCTION: CPT | Performed by: SPEECH-LANGUAGE PATHOLOGIST

## 2017-07-20 PROCEDURE — 80202 ASSAY OF VANCOMYCIN: CPT | Performed by: SURGERY

## 2017-07-20 PROCEDURE — 25010000002 HEPARIN (PORCINE) PER 1000 UNITS: Performed by: SURGERY

## 2017-07-20 PROCEDURE — 94660 CPAP INITIATION&MGMT: CPT

## 2017-07-20 PROCEDURE — 97110 THERAPEUTIC EXERCISES: CPT

## 2017-07-20 PROCEDURE — 85730 THROMBOPLASTIN TIME PARTIAL: CPT | Performed by: SURGERY

## 2017-07-20 RX ORDER — AMLODIPINE BESYLATE 5 MG/1
5 TABLET ORAL
Status: DISCONTINUED | OUTPATIENT
Start: 2017-07-20 | End: 2017-07-23

## 2017-07-20 RX ADMIN — INSULIN ASPART 6 UNITS: 100 INJECTION, SOLUTION INTRAVENOUS; SUBCUTANEOUS at 08:38

## 2017-07-20 RX ADMIN — SODIUM BICARBONATE 1300 MG: 650 TABLET ORAL at 21:40

## 2017-07-20 RX ADMIN — FAMOTIDINE 20 MG: 20 TABLET, FILM COATED ORAL at 08:39

## 2017-07-20 RX ADMIN — AMLODIPINE BESYLATE 5 MG: 5 TABLET ORAL at 12:15

## 2017-07-20 RX ADMIN — OXYCODONE HYDROCHLORIDE AND ACETAMINOPHEN 2 TABLET: 10; 325 TABLET ORAL at 01:57

## 2017-07-20 RX ADMIN — OXYCODONE HYDROCHLORIDE AND ACETAMINOPHEN 1 TABLET: 5; 325 TABLET ORAL at 21:40

## 2017-07-20 RX ADMIN — OXYCODONE HYDROCHLORIDE AND ACETAMINOPHEN 1 TABLET: 5; 325 TABLET ORAL at 12:29

## 2017-07-20 RX ADMIN — VITAMIN D, TAB 1000IU (100/BT) 5000 UNITS: 25 TAB at 08:39

## 2017-07-20 RX ADMIN — SERTRALINE 100 MG: 100 TABLET, FILM COATED ORAL at 08:38

## 2017-07-20 RX ADMIN — SODIUM BICARBONATE 1300 MG: 650 TABLET ORAL at 08:39

## 2017-07-20 RX ADMIN — VANCOMYCIN HYDROCHLORIDE 750 MG: 750 INJECTION, POWDER, LYOPHILIZED, FOR SOLUTION INTRAVENOUS at 12:15

## 2017-07-20 RX ADMIN — CARVEDILOL 3.12 MG: 3.12 TABLET, FILM COATED ORAL at 08:39

## 2017-07-20 RX ADMIN — DEXTROSE MONOHYDRATE 25 G: 25 INJECTION, SOLUTION INTRAVENOUS at 14:18

## 2017-07-20 RX ADMIN — DEXTROSE MONOHYDRATE 25 G: 25 INJECTION, SOLUTION INTRAVENOUS at 11:39

## 2017-07-20 RX ADMIN — SODIUM BICARBONATE 1300 MG: 650 TABLET ORAL at 12:16

## 2017-07-20 RX ADMIN — SODIUM BICARBONATE 1300 MG: 650 TABLET ORAL at 17:22

## 2017-07-20 RX ADMIN — NICOTINE 1 PATCH: 14 PATCH, EXTENDED RELEASE TRANSDERMAL at 21:42

## 2017-07-20 RX ADMIN — CARVEDILOL 3.12 MG: 3.12 TABLET, FILM COATED ORAL at 21:40

## 2017-07-20 RX ADMIN — ASPIRIN 81 MG: 81 TABLET ORAL at 08:39

## 2017-07-20 RX ADMIN — TAMSULOSIN HYDROCHLORIDE 0.8 MG: 0.4 CAPSULE ORAL at 08:39

## 2017-07-20 RX ADMIN — HEPARIN SODIUM 18.2 UNITS/KG/HR: 10000 INJECTION, SOLUTION INTRAVENOUS at 03:05

## 2017-07-20 RX ADMIN — ATORVASTATIN CALCIUM 40 MG: 20 TABLET, FILM COATED ORAL at 08:38

## 2017-07-20 RX ADMIN — HEPARIN SODIUM 18.2 UNITS/KG/HR: 10000 INJECTION, SOLUTION INTRAVENOUS at 18:53

## 2017-07-20 RX ADMIN — CEFEPIME 2 G: 2 INJECTION, POWDER, FOR SOLUTION INTRAVENOUS at 12:15

## 2017-07-20 NOTE — PROGRESS NOTES
INFECTIOUS DISEASES PROGRESS NOTE    CC: f/u pna    S:   Down to 3 L NC  Dry cough  No f/c/ns    O:  Physical Exam:  Temp:  [96.2 °F (35.7 °C)-98.7 °F (37.1 °C)] 96.3 °F (35.7 °C)  Heart Rate:  [62-70] 62  Resp:  [12-20] 16  BP: (151-176)/(70-80) 151/80  Physical Exam   GENERAL: Awake and alert, in no acute distress.   EYES:  No conjunctival injection. No lid lag.   HEART: Regular rate and rhythm. No peripheral edema.   LUNGS: Clear to auscultation with maybe scant dry rale in R base, normal respiratory effort.   GI: Soft, nontender, nondistended. No appreciable organomegaly.   SKIN: Warm and dry without cutaneous eruptions blister on L AKA stump  PSYCHIATRIC: Appropriate mood, affect, insight, and judgment.        Diagnostics:    WBC 12.2 (P80, L 9, M8, E2)  H/H 8/25    Cr 3.77    CT chest, personally reviewed: multifocal PNA     Estimated Creatinine Clearance: 29.7 mL/min (by C-G formula based on Cr of 3.77).    Assessment/Plan      1. Left stump osteomyelitis with abscess status post revision of the left above-the-knee amputation on 7/14. Prior wound cultures with MRSA  - continue vancomycin dosing per pharmacy  - Abx stop date was August 25, 2017     2.  Fever  bcx ngtd  Add cefepime     3.  Acute hypoxic respiratory failure 2/2 aspiration pneumonitis vs pneumonia  - Chest x-ray concerning for possible pneumonia  - CT chest with opacities but probably sterile chemical aspiration pneumonitis.  He is down to 3L and AF despite no adjustments in abx.  While sorting through this will add cefepime 2g iv q24 as dosed for KRISTA  - check PCT (albeit can be high from KRISTA) and sputum cx if can produce sputum      4. Type 2 diabetes complicating above      5. Peripheral vascular disease complicating above      6. Acute on CKD III, worse  - Nephrology following      Felipe Foster MD  10:01 AM  07/20/17
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
## 2017-07-20 NOTE — PLAN OF CARE
Problem: Patient Care Overview (Adult)  Goal: Plan of Care Review  Outcome: Ongoing (interventions implemented as appropriate)    07/20/17 1403   Coping/Psychosocial Response Interventions   Plan Of Care Reviewed With patient   Outcome Evaluation   Outcome Summary/Follow up Plan VSS. Patient blood sugar dropped to 52 @ lunch, orange juice given with no changed, folllowed up with D50, blood sugar increased to 101. Cefepime and Vanc given today as ordered. Creatinine better today. Will continue to monitor.       Goal: Adult Individualization and Mutuality  Outcome: Ongoing (interventions implemented as appropriate)    Problem: Fall Risk (Adult)  Goal: Absence of Falls  Outcome: Ongoing (interventions implemented as appropriate)    Problem: Pain, Acute (Adult)  Goal: Acceptable Pain Control/Comfort Level  Outcome: Ongoing (interventions implemented as appropriate)    Problem: Infection, Risk/Actual (Adult)  Goal: Infection Prevention/Resolution  Outcome: Ongoing (interventions implemented as appropriate)

## 2017-07-20 NOTE — PLAN OF CARE
"Problem: Patient Care Overview (Adult)  Goal: Plan of Care Review  Outcome: Ongoing (interventions implemented as appropriate)    07/20/17 0959   Coping/Psychosocial Response Interventions   Plan Of Care Reviewed With patient   Outcome Evaluation   Outcome Summary/Follow up Plan Pt with improved ambulation endurance despite reports of increased fatigue. Demo'd improved sequencing of \"lift/lower\" strategy. HEP reviewed, teachback successful.            "

## 2017-07-20 NOTE — PLAN OF CARE
Problem: Patient Care Overview (Adult)  Goal: Plan of Care Review  Outcome: Ongoing (interventions implemented as appropriate)    07/20/17 1455   Coping/Psychosocial Response Interventions   Plan Of Care Reviewed With patient         Problem: Inpatient SLP  Goal: Dysphagia- Patient will safely consume diet as per recommendation with no signs/symptoms of aspiration  Outcome: Ongoing (interventions implemented as appropriate)    07/20/17 1455   Safely Consume Diet   Safely Consume Diet- SLP, Time to Achieve by discharge

## 2017-07-20 NOTE — PROGRESS NOTES
[facility] INPATIENT PROGRESS NOTE         [address]    7/20/2017      PATIENT IDENTIFICATION:   Name:  Jay Blackwood      MRN:  [id number]     57 y.o.  male             LOS 6    Reason for visit: f/u PNA      SUBJECTIVE:    No cp, productive cough.  Less soa.    Objective   OBJECTIVE:    Vital Sign Min/Max for last 24 hours  Temp  Min: 96.2 °F (35.7 °C)  Max: 98.7 °F (37.1 °C)   BP  Min: 151/80  Max: 176/78   Pulse  Min: 62  Max: 70   Resp  Min: 12  Max: 20   SpO2  Min: 92 %  Max: 96 %   Flow (L/min)  Min: 3  Max: 6   No Data Recorded                         Body mass index is 27.46 kg/(m^2).    Intake/Output Summary (Last 24 hours) at 07/20/17 1046  Last data filed at 07/20/17 0544   Gross per 24 hour   Intake             1160 ml   Output             1750 ml   Net             -590 ml         Exam:  GEN:  No distress, appears stated age  EYES:   PERRL, anicteric sclera  ENT:    External ears/nose normal, OP clear  NECK:  No adenopathy, midline trachea  LUNGS: Normal chest on inspection, palpation and scattered course BS at bases on auscultation  CV:  Normal S1S2, without murmur  ABD:  Non tender, non distended, no hepatosplenomegaly, +BS  EXT:  No edema, cyanosis or clubbing    Scheduled meds:    amLODIPine 5 mg Oral Q24H   aspirin 81 mg Oral Daily   atorvastatin 40 mg Oral Daily   carvedilol 3.125 mg Oral Q12H   cefepime 2 g Intravenous Q24H   cholecalciferol 5,000 Units Oral Daily   famotidine 20 mg Intravenous Daily   Or      famotidine 20 mg Oral Daily   insulin aspart 0-9 Units Subcutaneous 4x Daily With Meals & Nightly   nicotine 1 patch Transdermal Q24H   sertraline 100 mg Oral Daily   sodium bicarbonate 1,300 mg Oral 4x Daily   tamsulosin 0.8 mg Oral Daily   Vancomycin Pharmacy Intermittent Dosing  Does not apply Daily     IV meds:                        heparin (porcine) 10.2 Units/kg/hr Last Rate: 18.2 Units/kg/hr (07/20/17 0305)   Pharmacy to dose vancomycin       Data  Review:    Results from last 7 days  Lab Units 07/20/17  0542 07/19/17  0603 07/18/17  0452 07/17/17  0458 07/16/17  2333 07/16/17  0518   SODIUM mmol/L 140 140 137 137  --  138   POTASSIUM mmol/L 4.1 4.3 4.1 4.4 4.4 4.3   CHLORIDE mmol/L 108* 110* 107 111*  --  109*   CO2 mmol/L 17.1* 13.9* 14.1* 13.3*  --  14.9*   BUN mg/dL 42* 41* 42* 41*  --  33*   CREATININE mg/dL 3.77* 4.28* 4.23* 4.14*  --  3.63*   GLUCOSE mg/dL 165* 113* 83 95  --  130*   CALCIUM mg/dL 7.9* 7.6* 7.7* 7.7*  --  7.7*         Estimated Creatinine Clearance: 29.7 mL/min (by C-G formula based on Cr of 3.77).    Results from last 7 days  Lab Units 07/20/17  0542 07/19/17  0603 07/18/17  2350 07/18/17  0452 07/17/17  0458   WBC 10*3/mm3 12.16* 12.33* 12.02* 7.75 8.85   HEMOGLOBIN g/dL 8.3* 9.2* 9.4* 7.7* 8.2*   PLATELETS 10*3/mm3 141 150 147 139* 133*       Results from last 7 days  Lab Units 07/18/17  2350   INR  1.18*       Results from last 7 days  Lab Units 07/19/17  0603   ALT (SGPT) U/L 6   AST (SGOT) U/L 16       Results from last 7 days  Lab Units 07/19/17  0705 07/19/17  0050   PH, ARTERIAL pH units 7.240* 7.318*   PO2 ART mm Hg 65.8* 48.3*   PCO2, ARTERIAL mm Hg 34.6* 29.9*   HCO3 ART mmol/L 14.8* 15.3*             Micro reviewed    CT chest viewed    Assessment   ASSESSMENT:   Bilateral lower lobe pneumonia/pneumonitis with signs of potential aspiration component  Acute hypoxemic respiratory failure secondary to pneumonia  NORY: Previously intolerant positive airway pressure  Metabolic acidosis with anion gap  Left stump osteomyelitis with 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  Continue clearance measures.  Encourage quit smoking.    Dwight Smith MD  Pulmonary and Critical Care Medicine  [facility], [facility]  7/20/2017    10:46 AM

## 2017-07-20 NOTE — PROGRESS NOTES
Continued Stay Note  [facility]     Patient Name: Jay Blackwood  MRN: [id number]  Today's Date: 7/20/2017    Admit Date: 7/14/2017          Discharge Plan       07/20/17 1218    Case Management/Social Work Plan    Plan Home with [facility]    Patient/Family In Agreement With Plan yes    Additional Comments Spoke with pt and significant other at bedside who confirm  plan is to return home with [facility].  Deny further needs at this time.  CCP will continue to follow. TONE Broussard RN              Discharge Codes     None            Sirisha Broussard

## 2017-07-20 NOTE — PROGRESS NOTES
"   LOS: 6 days    Patient Care Team:  JOELLE Pak as PCP - General (Family Medicine)  Prosper Oh MD as Consulting Physician (Cardiology)    Chief Complaint:  No chief complaint on file.      Subjective follow-up acute kidney injury on chronic kidney disease    Interval History:   Patient is feeling much better, no chest pain or shortness of air, no orthopnea or PND, no nausea or vomiting.  He is tolerating his oral intake without any problems and he has currently Garcia catheter anchored in place.      Review of Systems:   As noted above    Objective     Vital Signs  Temp:  [96.3 °F (35.7 °C)-98.7 °F (37.1 °C)] 96.5 °F (35.8 °C)  Heart Rate:  [55-70] 68  Resp:  [12-20] 16  BP: (143-176)/(64-80) 159/72    Flowsheet Rows         First Filed Value    Admission Height  74\" (188 cm) Documented at 07/14/2017 1002    Admission Weight  220 lb (99.8 kg) Documented at 07/14/2017 1002          I/O this shift:  In: -   Out: 650 [Urine:650]  I/O last 3 completed shifts:  In: 1690.8 [P.O.:1200; I.V.:218.2; Blood:272.7]  Out: 4200 [Urine:4200]    Intake/Output Summary (Last 24 hours) at 07/20/17 1756  Last data filed at 07/20/17 1458   Gross per 24 hour   Intake              920 ml   Output             1650 ml   Net             -730 ml       Physical Exam:  General Appearance: alert, oriented x 3, in mild distress, morbidly obese,   Skin: warm and dry  Neck: supple, no JVD, trachea midline  Lungs: Bilateral rhonchi, unlabored breathing effort  Heart: RRR, normal S1 and S2, no S3, no rub  Abdomen: soft, non-tender, no palpable bladder, present bowel sounds to auscultation  : Garcia catheter anchored in place  Extremities: 1+ edema of the right leg, many well-healed scars, he had left AKA stump is wrapped  Neuro: normal speech and mental status      Results Review:      Results from last 7 days  Lab Units 07/20/17  0542 07/19/17  0603 07/18/17  0452   SODIUM mmol/L 140 140 137   POTASSIUM mmol/L 4.1 4.3 4.1 "   CHLORIDE mmol/L 108* 110* 107   CO2 mmol/L 17.1* 13.9* 14.1*   BUN mg/dL 42* 41* 42*   CREATININE mg/dL 3.77* 4.28* 4.23*   CALCIUM mg/dL 7.9* 7.6* 7.7*   BILIRUBIN mg/dL  --  0.2  --    ALK PHOS U/L  --  77  --    ALT (SGPT) U/L  --  6  --    AST (SGOT) U/L  --  16  --    GLUCOSE mg/dL 165* 113* 83       Estimated Creatinine Clearance: 29.7 mL/min (by C-G formula based on Cr of 3.77).      Results from last 7 days  Lab Units 07/18/17  0452 07/17/17  0458 07/16/17  2333   MAGNESIUM mg/dL 1.9 1.7 1.6   PHOSPHORUS mg/dL 5.4* 4.9*  --          Results from last 7 days  Lab Units 07/18/17  0452   URIC ACID mg/dL 9.4*         Results from last 7 days  Lab Units 07/20/17  0542 07/19/17  0603 07/18/17  2350 07/18/17  0452 07/17/17  0458   WBC 10*3/mm3 12.16* 12.33* 12.02* 7.75 8.85   HEMOGLOBIN g/dL 8.3* 9.2* 9.4* 7.7* 8.2*   PLATELETS 10*3/mm3 141 150 147 139* 133*         Results from last 7 days  Lab Units 07/18/17  2350   INR  1.18*         Imaging Results (last 24 hours)     ** No results found for the last 24 hours. **          amLODIPine 5 mg Oral Q24H   aspirin 81 mg Oral Daily   atorvastatin 40 mg Oral Daily   carvedilol 3.125 mg Oral Q12H   cefepime 2 g Intravenous Q24H   cholecalciferol 5,000 Units Oral Daily   famotidine 20 mg Intravenous Daily   Or      famotidine 20 mg Oral Daily   insulin aspart 0-9 Units Subcutaneous 4x Daily With Meals & Nightly   nicotine 1 patch Transdermal Q24H   sertraline 100 mg Oral Daily   sodium bicarbonate 1,300 mg Oral 4x Daily   tamsulosin 0.8 mg Oral Daily   Vancomycin Pharmacy Intermittent Dosing  Does not apply Daily       heparin (porcine) 10.2 Units/kg/hr Last Rate: 18.2 Units/kg/hr (07/20/17 0305)   Pharmacy to dose vancomycin         Medication Review:   Current Facility-Administered Medications   Medication Dose Route Frequency Provider Last Rate Last Dose   • amLODIPine (NORVASC) tablet 5 mg  5 mg Oral Q24H Romeo Agee MD   5 mg at 07/20/17 1215   • aspirin EC  tablet 81 mg  81 mg Oral Daily Mark Hawthorne MD   81 mg at 07/20/17 0839   • atorvastatin (LIPITOR) tablet 40 mg  40 mg Oral Daily Mark Hawthorne MD   40 mg at 07/20/17 0838   • carvedilol (COREG) tablet 3.125 mg  3.125 mg Oral Q12H Romeo Agee MD   3.125 mg at 07/20/17 0839   • cefepime (MAXIPIME) 2 g/100 mL 0.9% NS (mbp)  2 g Intravenous Q24H Felipe Foster MD   2 g at 07/20/17 1215   • cholecalciferol (VITAMIN D3) tablet 5,000 Units  5,000 Units Oral Daily Heidi Medel MD   5,000 Units at 07/20/17 0839   • dextrose (D50W) solution 25 g  25 g Intravenous Q15 Min PRN Mark Hawthorne MD   25 g at 07/20/17 1418   • dextrose (GLUTOSE) oral gel 15 g  15 g Oral Q15 Min PRN Mark Hawthorne MD       • famotidine (PEPCID) injection 20 mg  20 mg Intravenous Daily Mark Hawthorne MD        Or   • famotidine (PEPCID) tablet 20 mg  20 mg Oral Daily Mark Hawthorne MD   20 mg at 07/20/17 0839   • glucagon (human recombinant) (GLUCAGEN DIAGNOSTIC) injection 1 mg  1 mg Subcutaneous Q15 Min PRN Mark Hawthorne MD       • heparin 07968 units/250 mL (100 units/mL) in 0.45 % NaCl infusion  10.2 Units/kg/hr Intravenous Titrated Mark Hawthorne MD 17.85 mL/hr at 07/20/17 0305 18.2 Units/kg/hr at 07/20/17 0305   • insulin aspart (novoLOG) injection 0-9 Units  0-9 Units Subcutaneous 4x Daily With Meals & Nightly Mark Hawthorne MD   6 Units at 07/20/17 0838   • morphine injection 4 mg  4 mg Intravenous Q2H PRN Leticia Stanton MD   4 mg at 07/15/17 1235   • naloxone (NARCAN) injection 0.4 mg  0.4 mg Intravenous Q5 Min PRN Leticia Stanton MD   0.4 mg at 07/19/17 0042   • nicotine (NICODERM CQ) 14 MG/24HR patch 1 patch  1 patch Transdermal Q24H Leticia Stanton MD   1 patch at 07/19/17 2233   • nitroglycerin (NITROSTAT) SL tablet 0.4 mg  0.4 mg Sublingual Q5 Min PRN Mark Hawthorne MD       • ondansetron (ZOFRAN) tablet 4 mg  4 mg Oral Q6H  PRN Mark Hawthorne MD        Or   • ondansetron ODT (ZOFRAN-ODT) disintegrating tablet 4 mg  4 mg Oral Q6H PRN Mark Hawthorne MD        Or   • ondansetron (ZOFRAN) injection 4 mg  4 mg Intravenous Q6H PRN Mark Hawthorne MD   4 mg at 07/16/17 1048   • oxyCODONE-acetaminophen (PERCOCET)  MG per tablet 2 tablet  2 tablet Oral Q6H PRN Mark Hawthorne MD   2 tablet at 07/20/17 0157   • oxyCODONE-acetaminophen (PERCOCET) 5-325 MG per tablet 1 tablet  1 tablet Oral Q4H PRN Mark Hawthorne MD   1 tablet at 07/20/17 1229   • Pharmacy to dose vancomycin   Does not apply Continuous PRN Mark Hawthorne MD       • sennosides-docusate sodium (SENOKOT-S) 8.6-50 MG tablet 2 tablet  2 tablet Oral BID PRN Mark Hawthorne MD       • sertraline (ZOLOFT) tablet 100 mg  100 mg Oral Daily Mark Hawthorne MD   100 mg at 07/20/17 0838   • sodium bicarbonate tablet 1,300 mg  1,300 mg Oral 4x Daily Heidi Medel MD   1,300 mg at 07/20/17 1722   • tamsulosin (FLOMAX) 24 hr capsule 0.8 mg  0.8 mg Oral Daily Heidi Medel MD   0.8 mg at 07/20/17 0839   • Vancomycin Pharmacy Intermittent Dosing   Does not apply Daily Mark Hawthorne MD           Assessment/Plan   1.  Acute kidney injury on chronic kidney disease, the he has questionable urinary retention, he had the previous fistula surgery with hypotension related to poor oral intake also sepsis syndrome and compromise or total regulation from the ACE inhibitor.  Creatinine is Down to 3.77 improving could be related to a component of urinary retention  2.  Urinary retention, Garcia catheter was anchored in place  3.  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  4.  ABL A  5.  Peripheral vascular disease and carotid disease  6.  Hypotension resolved  7.  Metabolic acidosis, treated with sodium bicarbonate      Plan:  1.  We'll continue the same treatment  2.  Surveillance labs          Hiro Roberson MD  07/20/17  5:56  PM

## 2017-07-20 NOTE — PROGRESS NOTES
"Pharmacokinetic Consult - Vancomycin Dosing (Follow-up Note)  Patient: Jay Blackwood  : 1960  MRN: [id number]  Admit date: 2017  9:49 AM    Day 7 during admit  Consult for Dr. Hawthorne  Treating: MRSA + LLE stump osteomyelitis  Goal trough: 15-20 mcg/ml    Relevant clinical data and objective history reviewed:  57 y.o. male 74\" (188 cm) 213 lb 14.4 oz (97 kg)  ID following, plans for vanco until     Creatinine   Date Value Ref Range Status   2017 3.77 (H) 0.76 - 1.27 mg/dL Final   2017 4.28 (H) 0.76 - 1.27 mg/dL Final   2017 4.23 (H) 0.76 - 1.27 mg/dL Final     BUN   Date Value Ref Range Status   2017 42 (H) 6 - 20 mg/dL Final     Estimated Creatinine Clearance: 29.7 mL/min (by C-G formula based on Cr of 3.77).    Lab Results   Component Value Date    WBC 12.16 (H) 2017    WBC 12.33 (H) 2017    WBC 12.02 (H) 2017     Temp:  [96.2 °F (35.7 °C)-98.7 °F (37.1 °C)] 96.3 °F (35.7 °C)  Heart Rate:  [62-70] 62  Resp:  [12-20] 16  BP: (151-176)/(70-80) 151/80    Baseline cultures/labs/radiology:   blood cx x2: NGTD   C.diff negative   MRI with evidence of osteomyelitis per ID clinic note on 7/3  6/16 wound cx (previous admission) MRSA    IV Anti-Infectives     Ordered     Dose/Rate Route Frequency Start Stop    17 1023  cefepime (MAXIPIME) 2 g/100 mL 0.9% NS (mbp)     Ordering Provider:  Felipe Foster MD    2 g Intravenous Every 24 Hours 17 1100      17 1255  vancomycin 500 mg/100 mL 0.9% NS IVPB (mbp)     Ordering Provider:  Mark Hawthorne MD    500 mg  over 50 Minutes Intravenous Once 17 1330 17 1608    17 1507  vancomycin 500 mg/100 mL 0.9% NS IVPB (mbp)     Ordering Provider:  Mark Hawthorne MD    500 mg  over 60 Minutes Intravenous Once 17 1545 17 1914    17 0849  Vancomycin Pharmacy Intermittent Dosing     Ordering Provider:  Mark Hawthorne MD     Does not apply " Daily 07/16/17 0930      07/14/17 1816  Pharmacy to dose vancomycin     Ordering Provider:  Mark Hawthorne MD     Does not apply Continuous PRN 07/14/17 1816      07/14/17 1001  ceFAZolin in dextrose (ANCEF) IVPB solution 2 g     Ordering Provider:  Mark Hawthorne MD    2 g Intravenous Once 07/14/17 1045 07/14/17 1335         Vancomycin dosing history:   Pt initiated on vanc outpt on 7/6 - at the time was on 1250 q24; home med rec notes 750 daily   7/14: 1500 mg q24 started - given at 2016  7/15: timing changed to 1400 - given at 1445  7/16 1410 random level: 25 mcg/ml; dosing held  7/17 0458 random level: 23.3 mcg/ml  7/18 0452 random level: 18.6 mcg/ml; Vancomycin 500mg IV @ 1814  7/19 0603= 19.9 mcg/ml; 500mg IV x1 @ 1518  7/20 0542 random: 16.8 mcg/ml    Assessment/Plan  1. Scr improved today but still elevated. UOP good. Continue to dose intermittently for now. Will give vancomycin 750mg IV x1 today.  2. Recheck random level in AM tomorrow.   Pharmacy will continue to follow daily while on vancomycin and adjust as needed.     Thank you,  Fabiana Madsen, PharmD, BCPS  07/20/17 10:39 AM

## 2017-07-20 NOTE — THERAPY TREATMENT NOTE
Acute Care - Physical Therapy Treatment Note  [facility]     Patient Name: Jay Blackwood  : 1960  MRN: [id number]  Today's Date: 2017  Onset of Illness/Injury or Date of Surgery Date: 17     Referring Physician: Christoph Rivers Date: 2017    Visit Dx:    ICD-10-CM ICD-9-CM   1. Decreased mobility R26.89 781.99   2. Chronic osteomyelitis M86.60 730.10     Patient Active Problem List   Diagnosis   • OA (osteoarthritis) of knee   • Diabetes mellitus   • Hypertension   • A-fib   • Neuropathic pain   • PVD (peripheral vascular disease)   • HLD (hyperlipidemia)   • Chronic osteomyelitis of left femur   • Chronic osteomyelitis               Adult Rehabilitation Note       17 0930 17 0911       Rehab Assessment/Intervention    Discipline physical therapy assistant  -SHARI physical therapy assistant  -SHARI     Document Type therapy note (daily note)  -SHARI therapy note (daily note)  -SHARI     Subjective Information agree to therapy  -SHARI agree to therapy  -SHARI     Patient Effort, Rehab Treatment good  -SHARI good  -SHARI     Precautions/Limitations fall precautions  -SHARI fall precautions  -SHARI     Specific Treatment Considerations (L)AKA Revision  -SHARI (L)AKA Revision  -SHARI     Recorded by [SHARI] Sai Stock PTA [SHARI] Sai Stock PTA     Vital Signs    Pre SpO2 (%) 98  -SHARI      O2 Delivery Pre Treatment supplemental O2   3L  -SHARI      Post SpO2 (%) 99  -SHARI      O2 Delivery Post Treatment supplemental O2   3L  -SHARI      Recorded by [SHARI] Sai Stock PTA      Pain Assessment    Pain Assessment 0-10  -SHARI 0-10  -SHARI     Pain Score 7  -SHARI 7  -SHARI     Post Pain Score 7  -SHARI      Pain Type Chronic pain  -SHARI Surgical pain  -SHARI     Pain Location Neck  -SHARI Leg  -SHARI     Pain Orientation  Left;Upper  -SHARI     Pain Intervention(s) Ambulation/increased activity;Repositioned;Rest  -SHARI Ambulation/increased activity;Repositioned;Rest  -SHARI     Recorded by [SHARI] Sai Stock PTA [SHARI] Sai Stock PTA     Cognitive  Assessment/Intervention    Current Cognitive/Communication Assessment functional  -SHARI functional  -SHARI     Orientation Status oriented x 4  -SHARI oriented x 4  -SHARI     Follows Commands/Answers Questions 100% of the time  -SHARI 100% of the time  -SHARI     Personal Safety WNL/WFL  -SHARI WNL/WFL  -SHARI     Personal Safety Interventions fall prevention program maintained;gait belt;nonskid shoes/slippers when out of bed  -SHARI fall prevention program maintained;gait belt;nonskid shoes/slippers when out of bed  -SHARI     Recorded by [SHARI] Sai Stock PTA [SHARI] Sai Stock PTA     Bed Mobility, Assessment/Treatment    Bed Mobility, Assistive Device bed rails;head of bed elevated  -SHARI bed rails;head of bed elevated  -SHAIR     Bed Mobility, Scoot/Bridge, [place] conditional independence  -SHARI supervision required  -SHARI     Bed Mob, Supine to Sit, [place] conditional independence  -SHARI not tested  -SHARI     Bed Mobility, Safety Issues decreased use of legs for bridging/pushing  -SHARI decreased use of legs for bridging/pushing  -SHARI     Bed Mobility, Impairments strength decreased  -SHARI strength decreased  -SHARI     Recorded by [SHARI] Sai Stock PTA [SHARI] Sai Stock PTA     Transfer Assessment/Treatment    Transfers, Sit-Stand [place] minimum assist (75% patient effort);verbal cues required;nonverbal cues required (demo/gesture)  -SHARI contact guard assist;verbal cues required  -SHARI     Transfers, Stand-Sit [place] contact guard assist;verbal cues required  -SHARI contact guard assist;verbal cues required  -SHARI     Transfers, Sit-Stand-Sit, Assist Device rolling walker  -SHARI rolling walker  -SHARI     Transfer, Impairments strength decreased;impaired balance  -SHARI strength decreased;impaired balance  -SHARI     Transfer, Comment Pt somewhat lethargic requiring increased assist initially, improved as session progressed.   -SHARI      Recorded by [SHARI] Sai Stock PTA [SHARI] Sai Stock PTA     Gait Assessment/Treatment    Gait, [place] Level  "contact guard assist;verbal cues required  -SHARI contact guard assist;verbal cues required  -SHARI     Gait, Assistive Device rolling walker  -SHARI rolling walker  -SHARI     Gait, Distance (Feet) 60  -SHARI 8  -SHARI     Gait, Gait Deviations edil decreased;limb motion velocity decreased;step length decreased  -SHARI edil decreased;limb motion velocity decreased;step length decreased  -SHARI     Gait, Safety Issues step length decreased;balance decreased during turns  -SHARI step length decreased;balance decreased during turns  -SHARI     Gait, Impairments strength decreased;impaired balance  -SHARI strength decreased;impaired balance  -SHARI     Gait, Comment Pt reported increased (R)leg fatigue following gait training, HEP reviewed for improved strength and endurance.   -SHARI Emphasis placed on \"lift/lower\" technique rather than \"hop\" to inhibit traumatic impact(s) on (R)foot.  -SHARI     Recorded by [SHARI] Sai Stock PTA [SHARI] Sai Stock PTA     Balance Skills Training    Standing-Level of Assistance  Contact guard  -SHARI     Static Standing Balance Support  assistive device  -SHARI     Standing-Balance Activities  Weight Shift A-P   static  -SHARI     Standing Balance # of Minutes  4  -SHARI     Recorded by  [SHARI] Sai Stock PTA     Therapy Exercises    Right Lower Extremity AROM:;10 reps;supine;heel slides;hip abduction/adduction;ankle pumps/circles  -SHARI AROM:;10 reps;supine;heel slides;hip abduction/adduction;ankle pumps/circles  -SHARI     Left Lower Extremity AROM:;10 reps;hip flexion;hip extension;hip abduction/adduction  -SHARI AROM:;10 reps;hip flexion;hip extension;hip abduction/adduction  -SHARI     Recorded by [SHARI] aSi Stock PTA [SHARI] Sai Stock PTA     Positioning and Restraints    Pre-Treatment Position in bed  -SHARI in bed  -SHARI     Post Treatment Position bed  -SHARI chair  -SHARI     In Bed supine;call light within reach;encouraged to call for assist;exit alarm on  -SHARI      In Chair  reclined;call light within reach;encouraged to call for assist;with " nsg   w/ SRNA Shreya  -SHARI     Recorded by [SHARI] Sai Stock, QUINTON [SHARI] Sai Stock PTA       User Key  (r) = Recorded By, (t) = Taken By, (c) = Cosigned By    Initials Name Effective Dates    SHARI Stock PTA 04/24/15 -                 IP PT Goals       07/17/17 1310          Transfer Training PT LTG    Transfer Training PT LTG, Date Established 07/17/17  -KH (r) GERA (t) KH (c)      Transfer Training PT LTG, Time to Achieve 1 wk  -KH (r) GERA (t) KH (c)      Transfer Training PT LTG, Activity Type all transfers  -KH (r) GERA (t) KH (c)      Transfer Training PT LTG, [place] Level contact guard assist  -KH (r) GERA (t) KH (c)      Transfer Training PT LTG, Assist Device --   Appropriate AD  -KH (r) GERA (t) KH (c)      Gait Training PT LTG    Gait Training Goal PT LTG, Date Established 07/17/17  -KH (r) GERA (t) KH (c)      Gait Training Goal PT LTG, Time to Achieve 1 wk  -KH (r) GERA (t) KH (c)      Gait Training Goal PT LTG, [place] Level contact guard assist;2 person assist required  -KH (r) GERA (t) KH (c)      Gait Training Goal PT LTG, Assist Device --   Appropriate AD  -KH (r) GERA (t) KH (c)      Gait Training Goal PT LTG, Distance to Achieve 20  -KH (r) GERA (t) KH (c)        User Key  (r) = Recorded By, (t) = Taken By, (c) = Cosigned By    Initials Name Provider Type    ARMANI Sifuentes, PT Physical Therapist    GERA Flowers, PT Student PT Student          Physical Therapy Education     Title: PT OT SLP Therapies (Done)     Topic: Physical Therapy (Done)     Point: Mobility training (Done)    Learning Progress Summary    Learner Readiness Method Response Comment Documented by Status   Patient Acceptance E DEEJAY,SALO MCCARTHY 07/20/17 0958 Done    Acceptance E SALO VILLALBA 07/18/17 0935 Done    Acceptance E SALO VILLALBA 07/17/17 1310 Done               Point: Home exercise program (Done)    Learning Progress Summary    Learner Readiness Method Response Comment Documented by Status   Patient Acceptance E DEEJAY,SALO MCCARTHY  "07/20/17 0958 Done    Acceptance E VU,NR  SHARI 07/18/17 0935 Done    Acceptance E VU,NR   07/17/17 1310 Done               Point: Body mechanics (Done)    Learning Progress Summary    Learner Readiness Method Response Comment Documented by Status   Patient Acceptance E VU,NR  SHARI 07/20/17 0958 Done    Acceptance E VU,NR  SHARI 07/18/17 0935 Done    Acceptance E VU,NR  GERA 07/17/17 1310 Done               Point: Precautions (Done)    Learning Progress Summary    Learner Readiness Method Response Comment Documented by Status   Patient Acceptance E VU,NR  SHARI 07/20/17 0958 Done    Acceptance E VU,NR  SHARI 07/18/17 0935 Done    Acceptance E VU,NR  GERA 07/17/17 1310 Done                      User Key     Initials Effective Dates Name Provider Type Discipline    SHARI 04/24/15 -  Sai Stock, PTA Physical Therapy Assistant PT     05/08/17 -  Reggie Flowers, PT Student PT Student PT                    PT Recommendation and Plan  Anticipated Equipment Needs At Discharge: walker  Anticipated Discharge Disposition: inpatient rehabilitation facility, home with home health  Planned Therapy Interventions: balance training, gait training, home exercise program, patient/family education, strengthening, transfer training  PT Frequency: daily  Plan of Care Review  Plan Of Care Reviewed With: patient  Outcome Summary/Follow up Plan: Pt with improved ambulation endurance despite reports of increased fatigue.  Demo'd improved sequencing of \"lift/lower\" strategy.  HEP reviewed, teachback successful.            Outcome Measures       07/20/17 1000 07/18/17 0900 07/17/17 1300    How much help from another person do you currently need...    Turning from your back to your side while in flat bed without using bedrails? 4  -SHARI 4  -SHARI 4  -KH (r) GERA (t) KH (c)    Moving from lying on back to sitting on the side of a flat bed without bedrails? 4  -SHARI 3  -SHARI 4  -KH (r) GERA (t) KH (c)    Moving to and from a bed to a chair (including a wheelchair)? 3  -SHARI 3  " -SHARI 3  -KH (r) GERA (t) KH (c)    Standing up from a chair using your arms (e.g., wheelchair, bedside chair)? 3  -SHARI 3  -SHARI 3  -KH (r) GERA (t) KH (c)    Climbing 3-5 steps with a railing? 1  -SHARI 1  -SHARI 1  -KH (r) GERA (t) KH (c)    To walk in hospital room? 3  -SHARI 3  -SHARI 2  -KH (r) GERA (t) KH (c)    AM-PAC 6 Clicks Score 18  -SHARI 17  -SHARI 17  -KH (r) GERA (t)    Functional Assessment    Outcome Measure Options AM-PAC 6 Clicks Basic Mobility (PT)  -SHARI AM-PAC 6 Clicks Basic Mobility (PT)  -SHARI AM-PAC 6 Clicks Basic Mobility (PT)  -KH (r) GERA (t) KH (c)      User Key  (r) = Recorded By, (t) = Taken By, (c) = Cosigned By    Initials Name Provider Type     Salma Sifuentes, PT Physical Therapist    SHARI Stock PTA Physical Therapy Assistant    GERA Flowers, PT Student PT Student           Time Calculation:         PT Charges       07/20/17 0958          Time Calculation    Start Time 0930  -SHARI      Stop Time 0954  -SHARI      Time Calculation (min) 24 min  -SHARI      PT Received On 07/20/17  -SHARI      PT - Next Appointment 07/21/17  -SHARI        User Key  (r) = Recorded By, (t) = Taken By, (c) = Cosigned By    Initials Name Provider Type    SHARI Stock PTA Physical Therapy Assistant          Therapy Charges for Today     Code Description Service Date Service Provider Modifiers Qty    [id number] HC PT THER PROC EA 15 MIN 7/20/2017 Sai Stock PTA GP 2          PT G-Codes  Outcome Measure Options: AM-PAC 6 Clicks Basic Mobility (PT)    Sai Stock PTA  7/20/2017

## 2017-07-20 NOTE — PROGRESS NOTES
Name: Jay Blackwood ADMIT: 2017   : 1960  PCP: JOELLE Pak    MRN: [id number] LOS: 6 days   AGE/SEX: 57 y.o. male  ROOM: [facility]     Subjective     HPI: 57 y.o. male status post revision left AKA for chronic refractory osteomyelitis with MRSA.  Patient did better overnight.  No fever.  He has a nonproductive cough.    Review of Systems    Objective     Vital Signs  Temp:  [96.2 °F (35.7 °C)-98.7 °F (37.1 °C)] 96.3 °F (35.7 °C)  Heart Rate:  [62-70] 62  Resp:  [12-20] 16  BP: (151-176)/(70-80) 151/80         Physical Exam  Vascular: Blister from adhesive stable.  No drainage.      Results from last 7 days  Lab Units 17  0542 17  0603 17  2350 17  0452 17  0458 17  0518 07/15/17  0439   WBC 10*3/mm3 12.16* 12.33* 12.02* 7.75 8.85 7.62 11.23*   HEMOGLOBIN g/dL 8.3* 9.2* 9.4* 7.7* 8.2* 9.0* 6.5*   PLATELETS 10*3/mm3 141 150 147 139* 133* 152 167     Results from last 7 days  Lab Units 17  0542 17  0603 17  0452 17  0458 17  2333 17  0518 07/15/17  0439   SODIUM mmol/L 140 140 137 137  --  138 133*   POTASSIUM mmol/L 4.1 4.3 4.1 4.4 4.4 4.3 4.4   CHLORIDE mmol/L 108* 110* 107 111*  --  109* 100   CO2 mmol/L 17.1* 13.9* 14.1* 13.3*  --  14.9* 18.4*   BUN mg/dL 42* 41* 42* 41*  --  33* 35*   CREATININE mg/dL 3.77* 4.28* 4.23* 4.14*  --  3.63* 3.14*   GLUCOSE mg/dL 165* 113* 83 95  --  130* 291*   Estimated Creatinine Clearance: 29.7 mL/min (by C-G formula based on Cr of 3.77).  Results from last 7 days  Lab Units 17  2350   PROTIME Seconds 14.6*   INR  1.18*       Billin, Post Op Global      Assessment/Plan     Active Hospital Problems (** Indicates Principal Problem)    Diagnosis Date Noted   • **Chronic osteomyelitis of left femur [M86.652] 2017   • Chronic osteomyelitis [M86.60] 2017   • A-fib [I48.91] 10/26/2016   • Diabetes mellitus [E11.9] 10/26/2016   • PVD (peripheral vascular disease) [I73.9]  10/26/2016      Resolved Hospital Problems    Diagnosis Date Noted Date Resolved   No resolved problems to display.        Assessment & Plan  57 y.o. male status post revision of left AKA.    Acute blood loss anemia: Transfused.  Hemoglobin has declined a bit today.  He is on a heparin drip.  No evidence of clinical bleeding at the level of his surgery.  No plans for transfusion today.  We will check CBC in a.m.    Mild persistent leukocytosis with white blood cell count of 12.  Potentially related to poor pulmonary toilet or even aspiration pneumonia.  Appreciate pulmonology's assistance in managing.    Acute kidney injury.  Creatinine actually down today from yesterday for the first time.  Hopefully this has plateaued and will continue to improve.    Keep on heparin drip now on 7 oral anticoagulant until we are more certain about the stability of his hemoglobin.  Perhaps one more day?    I'm not sure with his multiple comorbidities that he'll be able to go home.  ? placement      Mark Hawthorne MD  07/20/17  7:47 AM  Office Number [phone]

## 2017-07-20 NOTE — PLAN OF CARE
Problem: Patient Care Overview (Adult)  Goal: Plan of Care Review    07/19/17 0634     Coping/Psychosocial Response Interventions   Plan Of Care Reviewed With --  patient   Patient Care Overview   Progress no change --    Outcome Evaluation   Outcome Summary/Follow up Plan --  vitals stable. pain controlled with prn meds, 0@ weaned to 4L nasal cannula, no redness swelling or drainage from insertion site, patient now therapeutic on heparin infusion, continue to monitor         Problem: Fall Risk (Adult)  Goal: Absence of Falls  Outcome: Ongoing (interventions implemented as appropriate)    Problem: Pain, Acute (Adult)  Goal: Acceptable Pain Control/Comfort Level  Outcome: Ongoing (interventions implemented as appropriate)    Problem: Infection, Risk/Actual (Adult)  Goal: Infection Prevention/Resolution  Outcome: Ongoing (interventions implemented as appropriate)

## 2017-07-20 NOTE — PROGRESS NOTES
LOS: 6 days   Patient Care Team:  JOELLE Pak as PCP - General (Family Medicine)  Prosper Oh MD as Consulting Physician (Cardiology)    Chief Complaint: Follow-up for paroxysmal atrial fibrillation.      Interval History: Sinus rhythm.  No CP or SOA.      Vital Signs:  Temp:  [96.2 °F (35.7 °C)-98.7 °F (37.1 °C)] 96.3 °F (35.7 °C)  Heart Rate:  [62-70] 62  Resp:  [12-20] 16  BP: (151-176)/(70-80) 151/80    Intake/Output Summary (Last 24 hours) at 07/20/17 0847  Last data filed at 07/20/17 0544   Gross per 24 hour   Intake             1160 ml   Output             2150 ml   Net             -990 ml       Physical Exam:   General Appearance:    No acute distress, alert and oriented x4   Lungs:     Clear to auscultation bilaterally     Heart:    Regular rhythm and normal rate.  No murmurs, gallops, or       rubs.   Abdomen:     Soft, non-tender, non-distended.    Extremities:   Status post left AKA     Results Review:      Results from last 7 days  Lab Units 07/20/17  0542   SODIUM mmol/L 140   POTASSIUM mmol/L 4.1   CHLORIDE mmol/L 108*   CO2 mmol/L 17.1*   BUN mg/dL 42*   CREATININE mg/dL 3.77*   GLUCOSE mg/dL 165*   CALCIUM mg/dL 7.9*       Results from last 7 days  Lab Units 07/16/17  2333   TROPONIN T ng/mL 0.047*       Results from last 7 days  Lab Units 07/20/17  0542   WBC 10*3/mm3 12.16*   HEMOGLOBIN g/dL 8.3*   HEMATOCRIT % 25.3*   PLATELETS 10*3/mm3 141       Results from last 7 days  Lab Units 07/20/17  0542 07/19/17  2133 07/19/17  1321  07/18/17  2350   INR   --   --   --   --  1.18*   APTT seconds 80.3* 54.5* 59.3*  < > 41.2*   < > = values in this interval not displayed.        Results from last 7 days  Lab Units 07/18/17  0452   MAGNESIUM mg/dL 1.9           I reviewed the patient's new clinical results.        Assessment:  1.  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 Acute blood loss anemia post-op (requiring  transfusion)  4. Acute on chronic kidney injury  5. Paroxysmal atrial fibrillation  6. Coronary artery disease with prior stenting   7. EF 40-45% by echo 11/16  8. PVD with carotid disease  9. Diabetes    Plan:  -On Heparin drip for now.  Watch for bleeding and follow hemoglobin.  -NSR - continue Coreg 3.125 mg bid.  -Restart Norvasc 5 for HTN.  -Nephrology following for kidney issues.      Romeo Agee MD  07/20/17  8:47 AM

## 2017-07-21 ENCOUNTER — APPOINTMENT (OUTPATIENT)
Dept: GENERAL RADIOLOGY | Facility: HOSPITAL | Age: 57
End: 2017-07-21

## 2017-07-21 PROBLEM — Z79.4 DIABETES MELLITUS TYPE 2, INSULIN DEPENDENT (HCC): Status: ACTIVE | Noted: 2017-07-21

## 2017-07-21 PROBLEM — E11.9 DIABETES MELLITUS TYPE 2, INSULIN DEPENDENT (HCC): Status: ACTIVE | Noted: 2017-07-21

## 2017-07-21 LAB
ALBUMIN SERPL-MCNC: 2.1 G/DL (ref 3.5–5.2)
ANION GAP SERPL CALCULATED.3IONS-SCNC: 13.9 MMOL/L
APTT PPP: 61 SECONDS (ref 22.7–35.4)
ARTERIAL PATENCY WRIST A: POSITIVE
ATMOSPHERIC PRESS: 747.8 MMHG
BACTERIA SPEC AEROBE CULT: NORMAL
BACTERIA SPEC AEROBE CULT: NORMAL
BASE EXCESS BLDA CALC-SCNC: -8.3 MMOL/L (ref 0–2)
BASOPHILS # BLD AUTO: 0.02 10*3/MM3 (ref 0–0.2)
BASOPHILS NFR BLD AUTO: 0.2 % (ref 0–1.5)
BDY SITE: ABNORMAL
BUN BLD-MCNC: 37 MG/DL (ref 6–20)
BUN/CREAT SERPL: 10.7 (ref 7–25)
CALCIUM SPEC-SCNC: 7.8 MG/DL (ref 8.6–10.5)
CHLORIDE SERPL-SCNC: 112 MMOL/L (ref 98–107)
CO2 SERPL-SCNC: 17.1 MMOL/L (ref 22–29)
CREAT BLD-MCNC: 3.46 MG/DL (ref 0.76–1.27)
DEPRECATED RDW RBC AUTO: 55.4 FL (ref 37–54)
EOSINOPHIL # BLD AUTO: 0.09 10*3/MM3 (ref 0–0.7)
EOSINOPHIL NFR BLD AUTO: 0.8 % (ref 0.3–6.2)
ERYTHROCYTE [DISTWIDTH] IN BLOOD BY AUTOMATED COUNT: 16 % (ref 11.5–14.5)
GAS FLOW AIRWAY: 3 LPM
GFR SERPL CREATININE-BSD FRML MDRD: 18 ML/MIN/1.73
GLUCOSE BLD-MCNC: 166 MG/DL (ref 65–99)
GLUCOSE BLDC GLUCOMTR-MCNC: 132 MG/DL (ref 70–130)
GLUCOSE BLDC GLUCOMTR-MCNC: 169 MG/DL (ref 70–130)
GLUCOSE BLDC GLUCOMTR-MCNC: 184 MG/DL (ref 70–130)
GLUCOSE BLDC GLUCOMTR-MCNC: 191 MG/DL (ref 70–130)
GLUCOSE BLDC GLUCOMTR-MCNC: 208 MG/DL (ref 70–130)
GLUCOSE BLDC GLUCOMTR-MCNC: 210 MG/DL (ref 70–130)
HCO3 BLDA-SCNC: 17 MMOL/L (ref 22–28)
HCT VFR BLD AUTO: 24.5 % (ref 40.4–52.2)
HGB BLD-MCNC: 8.1 G/DL (ref 13.7–17.6)
IMM GRANULOCYTES # BLD: 0.15 10*3/MM3 (ref 0–0.03)
IMM GRANULOCYTES NFR BLD: 1.4 % (ref 0–0.5)
LYMPHOCYTES # BLD AUTO: 0.62 10*3/MM3 (ref 0.9–4.8)
LYMPHOCYTES NFR BLD AUTO: 5.8 % (ref 19.6–45.3)
MAGNESIUM SERPL-MCNC: 1.7 MG/DL (ref 1.6–2.6)
MCH RBC QN AUTO: 31.2 PG (ref 27–32.7)
MCHC RBC AUTO-ENTMCNC: 33.1 G/DL (ref 32.6–36.4)
MCV RBC AUTO: 94.2 FL (ref 79.8–96.2)
MODALITY: ABNORMAL
MONOCYTES # BLD AUTO: 1.46 10*3/MM3 (ref 0.2–1.2)
MONOCYTES NFR BLD AUTO: 13.6 % (ref 5–12)
NEUTROPHILS # BLD AUTO: 8.4 10*3/MM3 (ref 1.9–8.1)
NEUTROPHILS NFR BLD AUTO: 78.2 % (ref 42.7–76)
PCO2 BLDA: 33.1 MM HG (ref 35–45)
PH BLDA: 7.32 PH UNITS (ref 7.35–7.45)
PHOSPHATE SERPL-MCNC: 4.8 MG/DL (ref 2.5–4.5)
PLATELET # BLD AUTO: 154 10*3/MM3 (ref 140–500)
PMV BLD AUTO: 9.7 FL (ref 6–12)
PO2 BLDA: 75.5 MM HG (ref 80–100)
POTASSIUM BLD-SCNC: 3.7 MMOL/L (ref 3.5–5.2)
RBC # BLD AUTO: 2.6 10*6/MM3 (ref 4.6–6)
SAO2 % BLDCOA: 94 % (ref 92–99)
SET MECH RESP RATE: 20
SODIUM BLD-SCNC: 143 MMOL/L (ref 136–145)
URATE SERPL-MCNC: 9.2 MG/DL (ref 3.4–7)
VANCOMYCIN SERPL-MCNC: 19.9 MCG/ML (ref 5–40)
WBC NRBC COR # BLD: 10.74 10*3/MM3 (ref 4.5–10.7)

## 2017-07-21 PROCEDURE — 82803 BLOOD GASES ANY COMBINATION: CPT

## 2017-07-21 PROCEDURE — 80069 RENAL FUNCTION PANEL: CPT | Performed by: INTERNAL MEDICINE

## 2017-07-21 PROCEDURE — 36600 WITHDRAWAL OF ARTERIAL BLOOD: CPT

## 2017-07-21 PROCEDURE — 63710000001 INSULIN ASPART PER 5 UNITS: Performed by: SURGERY

## 2017-07-21 PROCEDURE — 74230 X-RAY XM SWLNG FUNCJ C+: CPT

## 2017-07-21 PROCEDURE — 94660 CPAP INITIATION&MGMT: CPT

## 2017-07-21 PROCEDURE — 63710000001 INSULIN DETEMER PER 5 UNITS: Performed by: HOSPITALIST

## 2017-07-21 PROCEDURE — 83735 ASSAY OF MAGNESIUM: CPT | Performed by: INTERNAL MEDICINE

## 2017-07-21 PROCEDURE — 99232 SBSQ HOSP IP/OBS MODERATE 35: CPT | Performed by: INTERNAL MEDICINE

## 2017-07-21 PROCEDURE — 85730 THROMBOPLASTIN TIME PARTIAL: CPT | Performed by: SURGERY

## 2017-07-21 PROCEDURE — 92611 MOTION FLUOROSCOPY/SWALLOW: CPT | Performed by: SPEECH-LANGUAGE PATHOLOGIST

## 2017-07-21 PROCEDURE — 92610 EVALUATE SWALLOWING FUNCTION: CPT | Performed by: SPEECH-LANGUAGE PATHOLOGIST

## 2017-07-21 PROCEDURE — 87070 CULTURE OTHR SPECIMN AEROBIC: CPT | Performed by: INTERNAL MEDICINE

## 2017-07-21 PROCEDURE — 97110 THERAPEUTIC EXERCISES: CPT

## 2017-07-21 PROCEDURE — 94799 UNLISTED PULMONARY SVC/PX: CPT

## 2017-07-21 PROCEDURE — 80202 ASSAY OF VANCOMYCIN: CPT | Performed by: SURGERY

## 2017-07-21 PROCEDURE — 85025 COMPLETE CBC W/AUTO DIFF WBC: CPT | Performed by: SURGERY

## 2017-07-21 PROCEDURE — 87205 SMEAR GRAM STAIN: CPT | Performed by: INTERNAL MEDICINE

## 2017-07-21 PROCEDURE — 84550 ASSAY OF BLOOD/URIC ACID: CPT | Performed by: INTERNAL MEDICINE

## 2017-07-21 PROCEDURE — 99231 SBSQ HOSP IP/OBS SF/LOW 25: CPT | Performed by: INTERNAL MEDICINE

## 2017-07-21 PROCEDURE — 82962 GLUCOSE BLOOD TEST: CPT

## 2017-07-21 PROCEDURE — 25010000002 CEFEPIME: Performed by: INTERNAL MEDICINE

## 2017-07-21 RX ORDER — SODIUM BICARBONATE 650 MG/1
1300 TABLET ORAL 3 TIMES DAILY
Status: DISCONTINUED | OUTPATIENT
Start: 2017-07-21 | End: 2017-07-25 | Stop reason: HOSPADM

## 2017-07-21 RX ORDER — HYDRALAZINE HYDROCHLORIDE 25 MG/1
25 TABLET, FILM COATED ORAL EVERY 8 HOURS SCHEDULED
Status: DISCONTINUED | OUTPATIENT
Start: 2017-07-21 | End: 2017-07-22

## 2017-07-21 RX ADMIN — SODIUM BICARBONATE 1300 MG: 650 TABLET ORAL at 13:04

## 2017-07-21 RX ADMIN — APIXABAN 5 MG: 5 TABLET, FILM COATED ORAL at 20:55

## 2017-07-21 RX ADMIN — CARVEDILOL 3.12 MG: 3.12 TABLET, FILM COATED ORAL at 13:05

## 2017-07-21 RX ADMIN — CARVEDILOL 3.12 MG: 3.12 TABLET, FILM COATED ORAL at 20:55

## 2017-07-21 RX ADMIN — SODIUM BICARBONATE 1300 MG: 650 TABLET ORAL at 20:55

## 2017-07-21 RX ADMIN — CEFEPIME 2 G: 2 INJECTION, POWDER, FOR SOLUTION INTRAVENOUS at 13:07

## 2017-07-21 RX ADMIN — APIXABAN 5 MG: 5 TABLET, FILM COATED ORAL at 13:05

## 2017-07-21 RX ADMIN — TAMSULOSIN HYDROCHLORIDE 0.8 MG: 0.4 CAPSULE ORAL at 13:06

## 2017-07-21 RX ADMIN — INSULIN ASPART 4 UNITS: 100 INJECTION, SOLUTION INTRAVENOUS; SUBCUTANEOUS at 13:14

## 2017-07-21 RX ADMIN — ASPIRIN 81 MG: 81 TABLET ORAL at 13:06

## 2017-07-21 RX ADMIN — ATORVASTATIN CALCIUM 40 MG: 20 TABLET, FILM COATED ORAL at 13:05

## 2017-07-21 RX ADMIN — VANCOMYCIN HYDROCHLORIDE 750 MG: 750 INJECTION, POWDER, LYOPHILIZED, FOR SOLUTION INTRAVENOUS at 13:07

## 2017-07-21 RX ADMIN — AMLODIPINE BESYLATE 5 MG: 5 TABLET ORAL at 13:05

## 2017-07-21 RX ADMIN — HYDRALAZINE HYDROCHLORIDE 25 MG: 25 TABLET ORAL at 22:05

## 2017-07-21 RX ADMIN — HYDRALAZINE HYDROCHLORIDE 25 MG: 25 TABLET ORAL at 17:42

## 2017-07-21 RX ADMIN — INSULIN DETEMIR 18 UNITS: 100 INJECTION, SOLUTION SUBCUTANEOUS at 20:58

## 2017-07-21 RX ADMIN — FAMOTIDINE 20 MG: 20 TABLET, FILM COATED ORAL at 13:05

## 2017-07-21 RX ADMIN — INSULIN ASPART 2 UNITS: 100 INJECTION, SOLUTION INTRAVENOUS; SUBCUTANEOUS at 20:58

## 2017-07-21 NOTE — PROGRESS NOTES
Continued Stay Note  [facility]     Patient Name: Jay Blackwood  MRN: [id number]  Today's Date: 7/21/2017    Admit Date: 7/14/2017          Discharge Plan       07/21/17 1620    Case Management/Social Work Plan    Plan Undetermined    Patient/Family In Agreement With Plan yes    Additional Comments Attempted to call sig other (Stephanie) several time and left voicemail to discuss possible SNF at discharge.  Will follow up Monday.  TONE Broussard RN              Discharge Codes     None            Sirisha Broussard

## 2017-07-21 NOTE — PROGRESS NOTES
Name: Jay Blackwood ADMIT: 2017   : 1960  PCP: JOELLE Pak    MRN: [id number] LOS: 7 days   AGE/SEX: 57 y.o. male  ROOM: [facility]     Subjective     HPI: 57 y.o. male status post left revision AKA.  Uneventful overnight.    Review of Systems    Objective     Vital Signs  Temp:  [96.2 °F (35.7 °C)-99.6 °F (37.6 °C)] 99.6 °F (37.6 °C)  Heart Rate:  [55-81] 72  Resp:  [16-20] 19  BP: (138-177)/(64-86) 138/86         Physical Exam  Vascular: Pulses still intact.  Dressing clean and dry.  Staple line okay.  No drainage.  No evidence of bleeding.      Results from last 7 days  Lab Units 17  0620 17  0542 17  0603 17  2350 17  0452 17  0458 17  0518   WBC 10*3/mm3 10.74* 12.16* 12.33* 12.02* 7.75 8.85 7.62   HEMOGLOBIN g/dL 8.1* 8.3* 9.2* 9.4* 7.7* 8.2* 9.0*   PLATELETS 10*3/mm3 154 141 150 147 139* 133* 152     Results from last 7 days  Lab Units 17  0620 17  0542 17  0603 17  0452 17  0458 17  2333 17  0518 07/15/17  0439   SODIUM mmol/L 143 140 140 137 137  --  138 133*   POTASSIUM mmol/L 3.7 4.1 4.3 4.1 4.4 4.4 4.3 4.4   CHLORIDE mmol/L 112* 108* 110* 107 111*  --  109* 100   CO2 mmol/L 17.1* 17.1* 13.9* 14.1* 13.3*  --  14.9* 18.4*   BUN mg/dL 37* 42* 41* 42* 41*  --  33* 35*   CREATININE mg/dL 3.46* 3.77* 4.28* 4.23* 4.14*  --  3.63* 3.14*   GLUCOSE mg/dL 166* 165* 113* 83 95  --  130* 291*   Estimated Creatinine Clearance: 32.3 mL/min (by C-G formula based on Cr of 3.46).  Results from last 7 days  Lab Units 17  2350   PROTIME Seconds 14.6*   INR  1.18*       Billin, Post Op Global      Assessment/Plan     Active Hospital Problems (** Indicates Principal Problem)    Diagnosis Date Noted   • **Chronic osteomyelitis of left femur [M86.652] 2017   • Chronic osteomyelitis [M86.60] 2017   • A-fib [I48.91] 10/26/2016   • Diabetes mellitus [E11.9] 10/26/2016   • PVD (peripheral vascular  disease) [I73.9] 10/26/2016      Resolved Hospital Problems    Diagnosis Date Noted Date Resolved   No resolved problems to display.        Assessment & Plan  57 y.o. male postoperative day #1 status post revision left AKA.  His multiple medical morbidities and preoperative level of function have extended hospital stay significantly.    Patient remains anemic.  This is in part due to acute blood loss from the surgery but he continues to drift down.  Should he be on IV iron?  Neupogen?  Defer to nephrology    Diabetes: Appreciate Dr. Lara's help.    ID managing antibiotics.  Start date August 25, 2017.  Cefepime was added for fever.  Possible pneumonia.    A. Fib:  I'm going to stop the heparin drip today and in start his home oral anticoagulation.    The more and more I see him the more more I suspect he will require subacute nursing or rehabilitation placement.  Will ask Dr. Moya to see      Mark Hawthorne MD  07/21/17  7:38 AM  Office Number [phone]

## 2017-07-21 NOTE — PROGRESS NOTES
"Pharmacokinetic Consult - Vancomycin Dosing (Follow-up Note)  Patient: Jay Blackwood  : 1960  MRN: [id number]  Admit date: 2017  9:49 AM    Day 8 during admit (on vanco prior to admission)  Consult for Dr Hawthorne  Treating: MRSA + LLE stump osteomyelitis  Goal trough: 15-20 mcg/ml    Relevant clinical data and objective history reviewed:  57 y.o. male 74\" (188 cm) 213 lb 14.4 oz (97 kg)  ID following, plans for vanco until     Creatinine   Date Value Ref Range Status   2017 3.46 (H) 0.76 - 1.27 mg/dL Final   2017 3.77 (H) 0.76 - 1.27 mg/dL Final   2017 4.28 (H) 0.76 - 1.27 mg/dL Final     BUN   Date Value Ref Range Status   2017 37 (H) 6 - 20 mg/dL Final     Estimated Creatinine Clearance: 32.3 mL/min (by C-G formula based on Cr of 3.46).    Lab Results   Component Value Date    WBC 10.74 (H) 2017    WBC 12.16 (H) 2017    WBC 12.33 (H) 2017     Temp:  [96.2 °F (35.7 °C)-99.6 °F (37.6 °C)] 99.6 °F (37.6 °C)  Heart Rate:  [55-81] 72  Resp:  [16-20] 19  BP: (138-177)/(64-86) 138/86    Baseline cultures/labs/radiology:   blood cx x2: NGTD   C.diff negative   MRI with evidence of osteomyelitis per ID clinic note on 7/3  6/16 wound cx (previous admission) MRSA    IV Anti-Infectives     Ordered     Dose/Rate Route Frequency Start Stop    17 1046  vancomycin 750 mg/250 mL 0.9% NS add-vantage     Ordering Provider:  Mark Hawthorne MD    750 mg  over 75 Minutes Intravenous Once 17 1130 17 1330    17 1023  cefepime (MAXIPIME) 2 g/100 mL 0.9% NS (mbp)     Ordering Provider:  Felipe Foster MD    2 g Intravenous Every 24 Hours 17 1100      17 0849  Vancomycin Pharmacy Intermittent Dosing     Ordering Provider:  Mark Hawthorne MD     Does not apply Daily 17 0930      17 1816  Pharmacy to dose vancomycin     Ordering Provider:  Mark Hawthorne MD     Does not apply Continuous PRN " 07/14/17 1816           Vancomycin dosing history:   Pt initiated on vanc outpt on 7/6 - at the time was on 1250 q24; home med rec notes 750 daily   7/14: 1500 mg q24 started - given at 2016  7/15: timing changed to 1400 - given at 1445  7/16 1410 random level: 25 mcg/ml; dosing held  7/17 0458 random level: 23.3 mcg/ml  7/18 0452 random level: 18.6 mcg/ml; Vancomycin 500mg IV @ 1814  7/19 0603= 19.9 mcg/ml; 500mg IV x1 @ 1518  7/20 0542 random: 16.8 mcg/ml; 750mg IV x1 @ 1215  7/21 0620 random: 19.9 mcg/ml    Assessment/Plan  1. Scr continues to improve, UOP appears stable. Has tolerated daily doses for 3 consecutive days. Will schedule 750mg IV q24h starting today and follow closely for changes.  2. Check trough prior to 3rd dose to evaluate (dose due at 1100 on 7/23).  Pharmacy will continue to follow daily while on vancomycin and adjust as needed.     Thank you,  Fabiana Madsen, PharmD, BCPS  07/21/17 7:48 AM

## 2017-07-21 NOTE — PROGRESS NOTES
Continued Stay Note  [facility]     Patient Name: Jay Blackwood  MRN: [id number]  Today's Date: 7/21/2017    Admit Date: 7/14/2017          Discharge Plan       07/21/17 1646    Case Management/Social Work Plan    Plan Home with HH vs SNF    Patient/Family In Agreement With Plan yes    Additional Comments Spoke with significant other, Stephanie, who states their first and second choices for SNF will be [facility] and [facility].  Spoke with Norma with [facility] who will follow and  with [facility] who will follow. TONE Broussard RN      07/21/17 1620    Case Management/Social Work Plan    Plan Undetermined    Patient/Family In Agreement With Plan yes    Additional Comments Attempted to call sig other (Stephanie) several time and left voicemail to discuss possible SNF at discharge.  Will follow up Monday.  TONE Broussard RN              Discharge Codes     None            Sirisha Broussard

## 2017-07-21 NOTE — PROGRESS NOTES
LOS: 7 days   Patient Care Team:  OJELLE Pak as PCP - General (Family Medicine)  Prosper Oh MD as Consulting Physician (Cardiology)    Chief Complaint: Follow-up for paroxysmal atrial fibrillation.      Interval History: Sinus rhythm.  Started back on Eliquis.     Vital Signs:  Temp:  [96.2 °F (35.7 °C)-99.6 °F (37.6 °C)] 99.6 °F (37.6 °C)  Heart Rate:  [55-81] 72  Resp:  [16-20] 19  BP: (138-177)/(64-86) 138/86    Intake/Output Summary (Last 24 hours) at 07/21/17 0845  Last data filed at 07/21/17 0542   Gross per 24 hour   Intake              360 ml   Output             1850 ml   Net            -1490 ml       Physical Exam:   General Appearance:    No acute distress, alert and oriented x4   Lungs:     Clear to auscultation bilaterally     Heart:    Regular rhythm and normal rate.  No murmurs, gallops, or       rubs.   Abdomen:     Soft, non-tender, non-distended.    Extremities:   Status post left AKA     Results Review:      Results from last 7 days  Lab Units 07/21/17  0620   SODIUM mmol/L 143   POTASSIUM mmol/L 3.7   CHLORIDE mmol/L 112*   CO2 mmol/L 17.1*   BUN mg/dL 37*   CREATININE mg/dL 3.46*   GLUCOSE mg/dL 166*   CALCIUM mg/dL 7.8*       Results from last 7 days  Lab Units 07/16/17  2333   TROPONIN T ng/mL 0.047*       Results from last 7 days  Lab Units 07/21/17  0620   WBC 10*3/mm3 10.74*   HEMOGLOBIN g/dL 8.1*   HEMATOCRIT % 24.5*   PLATELETS 10*3/mm3 154       Results from last 7 days  Lab Units 07/21/17  0620 07/20/17  0542 07/19/17  2133  07/18/17  2350   INR   --   --   --   --  1.18*   APTT seconds 61.0* 80.3* 54.5*  < > 41.2*   < > = values in this interval not displayed.        Results from last 7 days  Lab Units 07/21/17  0620   MAGNESIUM mg/dL 1.7           I reviewed the patient's new clinical results.        Assessment:  1.  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 Acute blood loss anemia post-op  (requiring transfusion)  4. Acute on chronic kidney injury  5. Paroxysmal atrial fibrillation  6. Coronary artery disease with prior stenting   7. EF 40-45% by echo 11/16  8. PVD with carotid disease  9. Diabetes    Plan:  -Started back on Eliquis per Dr. Hawthorne.  Off heparin.  -Continue Coreg - in NSR.      Will see prn over the weekend.  Please call if needed.  I will see back on Monday.     Romeo Agee MD  07/21/17  8:45 AM

## 2017-07-21 NOTE — PLAN OF CARE
Problem: Patient Care Overview (Adult)  Goal: Plan of Care Review  Outcome: Ongoing (interventions implemented as appropriate)    07/21/17 1158   Coping/Psychosocial Response Interventions   Plan Of Care Reviewed With patient   Outcome Evaluation   Outcome Summary/Follow up Plan VFSS penetration thin/mixed-rec mech soft, no mixed w/ NTL, meds w/ puree; may have water b/t meals         Problem: Inpatient SLP  Goal: Dysphagia- Patient will safely consume diet as per recommendation with no signs/symptoms of aspiration  Outcome: Ongoing (interventions implemented as appropriate)    07/21/17 1158   Safely Consume Diet   Safely Consume Diet- SLP, Time to Achieve by discharge   Safely Consume Diet- SLP, Additional Goal mech soft, no mixed/NTL   Safely Consume Diet- SLP, Outcome goal ongoing

## 2017-07-21 NOTE — PROGRESS NOTES
Name: Jay Blackwood ADMIT: 2017   : 1960  PCP: JOELLE Pak    MRN: [id number] LOS: 7 days   AGE/SEX: 57 y.o. male  ROOM: [facility]     Subjective   Subjective  Says he's doing okay.  Felt poorly earlier today.    Objective   Vital Signs  Temp:  [96.2 °F (35.7 °C)-99.6 °F (37.6 °C)] 99.6 °F (37.6 °C)  Heart Rate:  [55-81] 72  Resp:  [16-20] 19  BP: (138-177)/(64-86) 138/86  SpO2:  [88 %-98 %] 92 %  on  Flow (L/min):  [3] 3;   O2 Device: nasal cannula  Body mass index is 27.46 kg/(m^2).    Physical Exam   Constitutional: He is oriented to person, place, and time. No distress.   Cardiovascular: Normal rate and regular rhythm.    No murmur heard.  Pulmonary/Chest: Effort normal and breath sounds normal.   Abdominal: Soft. Bowel sounds are normal. He exhibits no distension. There is no tenderness.   Musculoskeletal: Normal range of motion. He exhibits no edema.   Left AKA   Neurological: He is alert and oriented to person, place, and time.   Skin: Skin is warm and dry. He is not diaphoretic.       Results Review:       I reviewed the patient's new clinical results.    Results from last 7 days  Lab Units 17  0620 17  0542 17  0603 17  2350 17  0452 17  0458 17  0518   WBC 10*3/mm3 10.74* 12.16* 12.33* 12.02* 7.75 8.85 7.62   HEMOGLOBIN g/dL 8.1* 8.3* 9.2* 9.4* 7.7* 8.2* 9.0*   PLATELETS 10*3/mm3 154 141 150 147 139* 133* 152     Results from last 7 days  Lab Units 17  0620 17  0542 17  0603 17  0452 17  0458 17  2333 17  0518 07/15/17  0439   SODIUM mmol/L 143 140 140 137 137  --  138 133*   POTASSIUM mmol/L 3.7 4.1 4.3 4.1 4.4 4.4 4.3 4.4   CHLORIDE mmol/L 112* 108* 110* 107 111*  --  109* 100   CO2 mmol/L 17.1* 17.1* 13.9* 14.1* 13.3*  --  14.9* 18.4*   BUN mg/dL 37* 42* 41* 42* 41*  --  33* 35*   CREATININE mg/dL 3.46* 3.77* 4.28* 4.23* 4.14*  --  3.63* 3.14*   GLUCOSE mg/dL 166* 165* 113* 83 95  --  130* 291*    Estimated Creatinine Clearance: 32.3 mL/min (by C-G formula based on Cr of 3.46).  Results from last 7 days  Lab Units 07/21/17  0620 07/20/17  0542 07/19/17  0603 07/18/17  0452 07/17/17  0458 07/16/17  2333 07/16/17  0518 07/15/17  0439   CALCIUM mg/dL 7.8* 7.9* 7.6* 7.7* 7.7*  --  7.7* 7.1*   ALBUMIN g/dL 2.10*  --  2.20* 2.50* 2.50*  --   --   --    MAGNESIUM mg/dL 1.7  --   --  1.9 1.7 1.6  --   --    PHOSPHORUS mg/dL 4.8*  --   --  5.4* 4.9*  --   --   --        Lab Results   Component Value Date    HGBA1C 7.89 (H) 10/26/2016     Glucose   Date/Time Value Ref Range Status   07/21/2017 0718 169 (H) 70 - 130 mg/dL Final   07/21/2017 0318 191 (H) 70 - 130 mg/dL Final   07/20/2017 2125 175 (H) 70 - 130 mg/dL Final   07/20/2017 1656 132 (H) 70 - 130 mg/dL Final   07/20/2017 1437 149 (H) 70 - 130 mg/dL Final   07/20/2017 1411 57 (L) 70 - 130 mg/dL Final   07/20/2017 1205 101 70 - 130 mg/dL Final   07/20/2017 1133 52 (L) 70 - 130 mg/dL Final         amLODIPine 5 mg Oral Q24H   apixaban 5 mg Oral Q12H   aspirin 81 mg Oral Daily   atorvastatin 40 mg Oral Daily   carvedilol 3.125 mg Oral Q12H   cefepime 2 g Intravenous Q24H   cholecalciferol 5,000 Units Oral Daily   famotidine 20 mg Intravenous Daily   Or      famotidine 20 mg Oral Daily   insulin aspart 0-9 Units Subcutaneous 4x Daily With Meals & Nightly   insulin detemir 15 Units Subcutaneous Nightly   nicotine 1 patch Transdermal Q24H   sertraline 100 mg Oral Daily   sodium bicarbonate 1,300 mg Oral 4x Daily   tamsulosin 0.8 mg Oral Daily   vancomycin 750 mg Intravenous Q24H       Pharmacy to dose vancomycin          Assessment/Plan   Assessment:     Active Hospital Problems (** Indicates Principal Problem)    Diagnosis Date Noted   • **Chronic osteomyelitis of left femur [M86.652] 07/14/2017   • Diabetes mellitus type 2, insulin dependent [E11.9, Z79.4] 07/21/2017   • Chronic osteomyelitis [M86.60] 07/14/2017   • A-fib [I48.91] 10/26/2016   • PVD (peripheral  vascular disease) [I73.9] 10/26/2016      Resolved Hospital Problems    Diagnosis Date Noted Date Resolved   No resolved problems to display.       Plan:   - POD#7 REVISION LT AMPUTATION ABOVE KNEE  - Continue patient's long acting insulin - LEVEMIR 18 units SC nightly.  - NOVOLOG scheduled with meals along with moderate dose correctional factor as needed.  - Check glucose QAC and QHS. For any BG less than 70 mg/dL, treat per hospital protocol  - HgbA1C and fasting BMP ordered for the morning.  Will review.    - NCS diet  - Nephrology following.      Carlin Barry MD  [facility]  07/21/17  9:56 AM

## 2017-07-21 NOTE — PLAN OF CARE
Problem: Patient Care Overview (Adult)  Goal: Plan of Care Review    07/21/17 1127   Outcome Evaluation   Outcome Summary/Follow up Plan (slowed coordination, lethargy preventing safe mobility today)

## 2017-07-21 NOTE — PLAN OF CARE
Problem: Patient Care Overview (Adult)  Goal: Plan of Care Review  Outcome: Ongoing (interventions implemented as appropriate)    07/21/17 0626   Coping/Psychosocial Response Interventions   Plan Of Care Reviewed With patient   Patient Care Overview   Progress improving   Outcome Evaluation   Outcome Summary/Follow up Plan VSS, bgm stable through the night, no insulin given, hep gtt remains therapeutic, dressing changed, no c/o pain, NAD, resting peacefully, continue to monitor.        Goal: Adult Individualization and Mutuality  Outcome: Ongoing (interventions implemented as appropriate)  Goal: Discharge Needs Assessment  Outcome: Ongoing (interventions implemented as appropriate)    07/21/17 0626   Discharge Needs Assessment   Discharge Disposition still a patient         Problem: Fall Risk (Adult)  Goal: Absence of Falls  Outcome: Ongoing (interventions implemented as appropriate)    07/21/17 0626   Fall Risk (Adult)   Absence of Falls making progress toward outcome         Problem: Pain, Acute (Adult)  Goal: Acceptable Pain Control/Comfort Level  Outcome: Ongoing (interventions implemented as appropriate)    07/21/17 0626   Pain, Acute (Adult)   Acceptable Pain Control/Comfort Level making progress toward outcome         Problem: Infection, Risk/Actual (Adult)  Goal: Infection Prevention/Resolution  Outcome: Ongoing (interventions implemented as appropriate)    07/21/17 0626   Infection, Risk/Actual (Adult)   Infection Prevention/Resolution making progress toward outcome

## 2017-07-21 NOTE — PROGRESS NOTES
INFECTIOUS DISEASES PROGRESS NOTE    CC: f/u MRSA stump infection    S: Afebrile, improving oxygenation.  Productive cough continues.  No abdominal pain nausea vomiting or diarrhea.  Tolerating medication without a rash.  Continues to have significant left lower extremity stump pain    O:  Physical Exam:  Temp:  [96.2 °F (35.7 °C)-99.6 °F (37.6 °C)] 99.6 °F (37.6 °C)  Heart Rate:  [55-81] 72  Resp:  [16-20] 19  BP: (138-177)/(64-86) 138/86   93% on 3 L nasal cannula    Physical Exam   No apparent distress  Regular rate and rhythm no lower extremity edema   Soft nontender nondistended  Left stump warm to the touch, upper thigh with a blister lesion, incision without purulence, some swelling present  No rashes    Antibiotics  Cefepime 2 g IV every 24 hours  Vancomycin 750 mg IV each 24 hours    Diagnostics:    Lab Results   Component Value Date    WBC 10.74 (H) 07/21/2017    HGB 8.1 (L) 07/21/2017    HCT 24.5 (L) 07/21/2017    MCV 94.2 07/21/2017     07/21/2017     Lab Results   Component Value Date    GLUCOSE 166 (H) 07/21/2017    BUN 37 (H) 07/21/2017    CREATININE 3.46 (H) 07/21/2017    EGFRIFNONA 18 (L) 07/21/2017    EGFRIFAFRI  09/15/2016      Comment:      <15 Indicative of kidney failure.    BCR 10.7 07/21/2017    K 3.7 07/21/2017    CO2 17.1 (L) 07/21/2017    CALCIUM 7.8 (L) 07/21/2017    ALBUMIN 2.10 (L) 07/21/2017    LABIL2 0.7 07/19/2017    AST 16 07/19/2017    ALT 6 07/19/2017     Microbiology:  7/21 SCx P  7/19 BCx NGTD x2   7/16 C diff negative  7/16 BCx NGTDx2  6/16 Wound cx MRSA    Assessment/Plan   1. Left stump osteomyelitis with abscess status post revision of the left above-the-knee amputation on 7/14. Prior wound cultures with MRSA  - continue vancomycin dosing per pharmacy  - Abx stop date was August 25, 2017    2.  Fever 2/2 #3 - resolved  - Blood cultures negative to date    3.  Healthcare associated pneumonia/aspiration pneumonia  - Continue empiric cefepime renally dosed.  Day 2  -  Follow-up sputum culture    4.  Acute hypoxic respiratory failure secondary to #3 above improving     5. Type 2 diabetes complicating above     6. Peripheral vascular disease complicating above     7. Acute on CKD III - improving  - Nephrology following    8.  Confusion - secondary to renal failure and decreased clearance of pain medicine?      Eugenia Jung MD  8:03 AM  07/21/17

## 2017-07-21 NOTE — CODE DOCUMENTATION
NIH 1 d/t right leg weakness p says is baseline.  Pt drowsy and nods off during conversation.  Call out to Dr Hawthorne.  Last pain med 9 pm last night.  B/p elevated.
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
## 2017-07-21 NOTE — PLAN OF CARE
Problem: Patient Care Overview (Adult)  Goal: Plan of Care Review  Outcome: Ongoing (interventions implemented as appropriate)    07/21/17 1824   Coping/Psychosocial Response Interventions   Plan Of Care Reviewed With patient   Patient Care Overview   Progress no change   Outcome Evaluation   Outcome Summary/Follow up Plan pt lethargic today. last pain med given last pm. pt increasingly more lethargic, confused to place and situation and dropping things often. pt unable to stay awake through conversations and meals. rapid response called around noon today. abg's drawn and NIH assessed. confusion is thought to be from decreased renal function. pt will arouse to voice. diet changed to mechanical soft nectar thick liquids per speech after vieo swallow today. oral care done and sips of water given between meals when pt is awake and alert. two isolated events of uncontrolled afib for only two minutes at a time, otherwise in sinus. b/p elevated today. hydralazine added per nephrology. narcotics held. continue to monitor       Goal: Discharge Needs Assessment  Outcome: Ongoing (interventions implemented as appropriate)    Problem: Fall Risk (Adult)  Goal: Absence of Falls  Outcome: Ongoing (interventions implemented as appropriate)    07/21/17 1824   Fall Risk (Adult)   Absence of Falls making progress toward outcome         Problem: Pain, Acute (Adult)  Goal: Acceptable Pain Control/Comfort Level  Outcome: Ongoing (interventions implemented as appropriate)    07/21/17 1824   Pain, Acute (Adult)   Acceptable Pain Control/Comfort Level making progress toward outcome         Problem: Infection, Risk/Actual (Adult)  Goal: Infection Prevention/Resolution  Outcome: Ongoing (interventions implemented as appropriate)    07/21/17 1824   Infection, Risk/Actual (Adult)   Infection Prevention/Resolution making progress toward outcome

## 2017-07-21 NOTE — CODE DOCUMENTATION
Awaiting Dr Hawthorne return call.  Primary RN to talk with him.  Pt resting comfortably in bed. RN Will continue to monitor.  Care returned to RN

## 2017-07-21 NOTE — PROGRESS NOTES
"   LOS: 7 days    Patient Care Team:  JOELLE Pak as PCP - General (Family Medicine)  Prosper Oh MD as Consulting Physician (Cardiology)    Chief Complaint:  No chief complaint on file.      Subjective   F/u KRISTA     Interval History:   Lethargic earlier and rapid response called to concern for CVA; more awake now but still very drowsy; denies soa, cp, n/v    Objective     Vital Signs  Temp:  [96.2 °F (35.7 °C)-99.8 °F (37.7 °C)] 99.8 °F (37.7 °C)  Heart Rate:  [64-81] 64  Resp:  [13-20] 15  BP: (138-202)/(73-86) 171/75    Flowsheet Rows         First Filed Value    Admission Height  74\" (188 cm) Documented at 07/14/2017 1002    Admission Weight  220 lb (99.8 kg) Documented at 07/14/2017 1002          I/O this shift:  In: 210 [P.O.:210]  Out: 1000 [Urine:1000]  I/O last 3 completed shifts:  In: 1280 [P.O.:1080; I.V.:200]  Out: 2850 [Urine:2850]    Intake/Output Summary (Last 24 hours) at 07/21/17 1602  Last data filed at 07/21/17 1434   Gross per 24 hour   Intake              570 ml   Output             2200 ml   Net            -1630 ml       Physical Exam:  gen sitting upright, drowsy but arousable and able to converse  Neck supple no jvd  Lungs CTA bilat no rales  CV RRR  abd soft nt/nd  +segal  vasc LLE AKA, trace RLE edema      Results Review:      Results from last 7 days  Lab Units 07/21/17  0620 07/20/17  0542 07/19/17  0603   SODIUM mmol/L 143 140 140   POTASSIUM mmol/L 3.7 4.1 4.3   CHLORIDE mmol/L 112* 108* 110*   CO2 mmol/L 17.1* 17.1* 13.9*   BUN mg/dL 37* 42* 41*   CREATININE mg/dL 3.46* 3.77* 4.28*   CALCIUM mg/dL 7.8* 7.9* 7.6*   BILIRUBIN mg/dL  --   --  0.2   ALK PHOS U/L  --   --  77   ALT (SGPT) U/L  --   --  6   AST (SGOT) U/L  --   --  16   GLUCOSE mg/dL 166* 165* 113*       Estimated Creatinine Clearance: 32.3 mL/min (by C-G formula based on Cr of 3.46).      Results from last 7 days  Lab Units 07/21/17  0620 07/18/17  0452 07/17/17  0458   MAGNESIUM mg/dL 1.7 1.9 1.7 "   PHOSPHORUS mg/dL 4.8* 5.4* 4.9*         Results from last 7 days  Lab Units 07/21/17  0620 07/18/17  0452   URIC ACID mg/dL 9.2* 9.4*         Results from last 7 days  Lab Units 07/21/17  0620 07/20/17  0542 07/19/17  0603 07/18/17  2350 07/18/17  0452   WBC 10*3/mm3 10.74* 12.16* 12.33* 12.02* 7.75   HEMOGLOBIN g/dL 8.1* 8.3* 9.2* 9.4* 7.7*   PLATELETS 10*3/mm3 154 141 150 147 139*         Results from last 7 days  Lab Units 07/18/17  2350   INR  1.18*         Imaging Results (last 24 hours)     Procedure Component Value Units Date/Time    FL Video Swallow [[id number]] Updated:  07/21/17 1009          amLODIPine 5 mg Oral Q24H   apixaban 5 mg Oral Q12H   aspirin 81 mg Oral Daily   atorvastatin 40 mg Oral Daily   carvedilol 3.125 mg Oral Q12H   cefepime 2 g Intravenous Q24H   cholecalciferol 5,000 Units Oral Daily   famotidine 20 mg Intravenous Daily   Or      famotidine 20 mg Oral Daily   insulin aspart 0-9 Units Subcutaneous 4x Daily With Meals & Nightly   insulin detemir 18 Units Subcutaneous Nightly   nicotine 1 patch Transdermal Q24H   sertraline 100 mg Oral Daily   sodium bicarbonate 1,300 mg Oral 4x Daily   tamsulosin 0.8 mg Oral Daily   vancomycin 750 mg Intravenous Q24H       Pharmacy to dose vancomycin        Medication Review:   Current Facility-Administered Medications   Medication Dose Route Frequency Provider Last Rate Last Dose   • amLODIPine (NORVASC) tablet 5 mg  5 mg Oral Q24H Romeo Agee MD   5 mg at 07/21/17 1305   • apixaban (ELIQUIS) tablet 5 mg  5 mg Oral Q12H Mark Hawthorne MD   5 mg at 07/21/17 1305   • aspirin EC tablet 81 mg  81 mg Oral Daily Mark Hawthorne MD   81 mg at 07/21/17 1306   • atorvastatin (LIPITOR) tablet 40 mg  40 mg Oral Daily Mark Hawthorne MD   40 mg at 07/21/17 1305   • carvedilol (COREG) tablet 3.125 mg  3.125 mg Oral Q12H Romeo Agee MD   3.125 mg at 07/21/17 3944   • cefepime (MAXIPIME) 2 g/100 mL 0.9% NS (mbp)  2 g Intravenous  Q24H Felipe Foster MD   2 g at 07/21/17 1307   • cholecalciferol (VITAMIN D3) tablet 5,000 Units  5,000 Units Oral Daily Heidi Medel MD   5,000 Units at 07/20/17 0839   • dextrose (D50W) solution 25 g  25 g Intravenous Q15 Min PRN Mark Hawthorne MD   25 g at 07/20/17 1418   • dextrose (GLUTOSE) oral gel 15 g  15 g Oral Q15 Min PRN Mark Hawthorne MD       • famotidine (PEPCID) injection 20 mg  20 mg Intravenous Daily Mark Hawthorne MD        Or   • famotidine (PEPCID) tablet 20 mg  20 mg Oral Daily Mark Hawthorne MD   20 mg at 07/21/17 1305   • glucagon (human recombinant) (GLUCAGEN DIAGNOSTIC) injection 1 mg  1 mg Subcutaneous Q15 Min PRN Mark Hawthorne MD       • insulin aspart (novoLOG) injection 0-9 Units  0-9 Units Subcutaneous 4x Daily With Meals & Nightly Mark Hawthorne MD   4 Units at 07/21/17 1314   • insulin detemir (LEVEMIR) injection 18 Units  18 Units Subcutaneous Nightly Carlin Barry MD       • morphine injection 4 mg  4 mg Intravenous Q2H PRN Leticia Stanton MD   4 mg at 07/15/17 1235   • naloxone (NARCAN) injection 0.4 mg  0.4 mg Intravenous Q5 Min PRN Leticia Stanton MD   0.4 mg at 07/19/17 0042   • nicotine (NICODERM CQ) 14 MG/24HR patch 1 patch  1 patch Transdermal Q24H Leticia Stanton MD   1 patch at 07/20/17 2142   • nitroglycerin (NITROSTAT) SL tablet 0.4 mg  0.4 mg Sublingual Q5 Min PRN Mark Hawthorne MD       • ondansetron (ZOFRAN) tablet 4 mg  4 mg Oral Q6H PRN Mark Hawthorne MD        Or   • ondansetron ODT (ZOFRAN-ODT) disintegrating tablet 4 mg  4 mg Oral Q6H PRN Mark Hawthorne MD        Or   • ondansetron (ZOFRAN) injection 4 mg  4 mg Intravenous Q6H PRN Mark Hawthorne MD   4 mg at 07/16/17 1048   • oxyCODONE-acetaminophen (PERCOCET)  MG per tablet 2 tablet  2 tablet Oral Q6H PRN Mark Hawthorne MD   2 tablet at 07/20/17 0157   • oxyCODONE-acetaminophen (PERCOCET) 5-325 MG  per tablet 1 tablet  1 tablet Oral Q4H PRN Mark Hawthorne MD   1 tablet at 07/20/17 2140   • Pharmacy to dose vancomycin   Does not apply Continuous PRN Mark Hawthorne MD       • sennosides-docusate sodium (SENOKOT-S) 8.6-50 MG tablet 2 tablet  2 tablet Oral BID PRN Mark Hawthorne MD       • sertraline (ZOLOFT) tablet 100 mg  100 mg Oral Daily Mark Hawthorne MD   100 mg at 07/20/17 0838   • sodium bicarbonate tablet 1,300 mg  1,300 mg Oral 4x Daily Heidi Meedl MD   1,300 mg at 07/21/17 1304   • tamsulosin (FLOMAX) 24 hr capsule 0.8 mg  0.8 mg Oral Daily Heidi Medel MD   0.8 mg at 07/21/17 1306   • vancomycin 750 mg/250 mL 0.9% NS add-vantage  750 mg Intravenous Q24H Mark Hawthorne MD   750 mg at 07/21/17 1307       Assessment/Plan      1.  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  Urinary retention - segal in place since MON per RN; on flomax, too drowsy for VT  3.  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  4.  HTN - BP elevated  5.  Peripheral vascular disease and carotid disease  6.  acute encephalopathy - etiol unclear; doubt uremic as azotemia improving; no CO2 retention on ABG; last pain meds last night  7.  Metabolic acidosis - poor response to PO sodium bicarb  8.  Anemia - Hb down to 8.1  9.  Sepsis, PNA - cefepime & vanc; pharmacy dosing latter    Plan  - add hydralazine 25 TID   - hold off voiding trial til more alert   - renal fcn slowly improving      Principal Problem:    Chronic osteomyelitis of left femur  Active Problems:    Diabetes mellitus    A-fib    PVD (peripheral vascular disease)    Chronic osteomyelitis              Mikey Clarke MD  07/21/17  4:02 PM

## 2017-07-21 NOTE — PROGRESS NOTES
[facility] INPATIENT PROGRESS NOTE         [address]    7/21/2017      PATIENT IDENTIFICATION:   Name:  Jay Blackwood      MRN:  [id number]     57 y.o.  male             LOS 7    Reason for visit: f/u PNA      SUBJECTIVE:    No cp, productive cough.  Less soa.    Objective   OBJECTIVE:    Vital Sign Min/Max for last 24 hours  Temp  Min: 96.2 °F (35.7 °C)  Max: 99.6 °F (37.6 °C)   BP  Min: 138/86  Max: 177/84   Pulse  Min: 55  Max: 81   Resp  Min: 16  Max: 20   SpO2  Min: 88 %  Max: 98 %   Flow (L/min)  Min: 3  Max: 3   No Data Recorded                         Body mass index is 27.46 kg/(m^2).    Intake/Output Summary (Last 24 hours) at 07/21/17 1100  Last data filed at 07/21/17 0956   Gross per 24 hour   Intake              570 ml   Output             1850 ml   Net            -1280 ml         Exam:  GEN:  No distress, appears stated age  EYES:   PERRL, anicteric sclera  ENT:    External ears/nose normal, OP clear  NECK:  No adenopathy, midline trachea  LUNGS: Normal chest on inspection, palpation and scattered course BS at bases on auscultation  CV:  Normal S1S2, without murmur  ABD:  Non tender, non distended, no hepatosplenomegaly, +BS  EXT:  No edema, cyanosis or clubbing    Scheduled meds:      amLODIPine 5 mg Oral Q24H   apixaban 5 mg Oral Q12H   aspirin 81 mg Oral Daily   atorvastatin 40 mg Oral Daily   carvedilol 3.125 mg Oral Q12H   cefepime 2 g Intravenous Q24H   cholecalciferol 5,000 Units Oral Daily   famotidine 20 mg Intravenous Daily   Or      famotidine 20 mg Oral Daily   insulin aspart 0-9 Units Subcutaneous 4x Daily With Meals & Nightly   insulin detemir 18 Units Subcutaneous Nightly   nicotine 1 patch Transdermal Q24H   sertraline 100 mg Oral Daily   sodium bicarbonate 1,300 mg Oral 4x Daily   tamsulosin 0.8 mg Oral Daily   vancomycin 750 mg Intravenous Q24H     IV meds:                          Pharmacy to dose vancomycin      Data Review:    Results from last 7 days  Lab  Units 07/21/17  0620 07/20/17  0542 07/19/17  0603 07/18/17  0452 07/17/17  0458   SODIUM mmol/L 143 140 140 137 137   POTASSIUM mmol/L 3.7 4.1 4.3 4.1 4.4   CHLORIDE mmol/L 112* 108* 110* 107 111*   CO2 mmol/L 17.1* 17.1* 13.9* 14.1* 13.3*   BUN mg/dL 37* 42* 41* 42* 41*   CREATININE mg/dL 3.46* 3.77* 4.28* 4.23* 4.14*   GLUCOSE mg/dL 166* 165* 113* 83 95   CALCIUM mg/dL 7.8* 7.9* 7.6* 7.7* 7.7*         Estimated Creatinine Clearance: 32.3 mL/min (by C-G formula based on Cr of 3.46).    Results from last 7 days  Lab Units 07/21/17  0620 07/20/17  0542 07/19/17  0603 07/18/17  2350 07/18/17  0452   WBC 10*3/mm3 10.74* 12.16* 12.33* 12.02* 7.75   HEMOGLOBIN g/dL 8.1* 8.3* 9.2* 9.4* 7.7*   PLATELETS 10*3/mm3 154 141 150 147 139*       Results from last 7 days  Lab Units 07/18/17  2350   INR  1.18*       Results from last 7 days  Lab Units 07/19/17  0603   ALT (SGPT) U/L 6   AST (SGOT) U/L 16       Results from last 7 days  Lab Units 07/19/17  0705 07/19/17  0050   PH, ARTERIAL pH units 7.240* 7.318*   PO2 ART mm Hg 65.8* 48.3*   PCO2, ARTERIAL mm Hg 34.6* 29.9*   HCO3 ART mmol/L 14.8* 15.3*             Micro reviewed    CT chest viewed    Assessment   ASSESSMENT:   Bilateral lower lobe pneumonia/pneumonitis with signs of potential aspiration component  Acute hypoxemic respiratory failure secondary to pneumonia  NORY: Previously intolerant positive airway pressure  Metabolic acidosis with anion gap  Left stump osteomyelitis with abscess status post revision and left above-knee amputation 7/14  History MRSA  Diabetes mellitus type 2  Acute on chronic kidney disease  Tobacco abuse: Suspect component of COPD      PLAN:  Abx per ID  Continue clearance measures.  Encourage quit smoking.    Dwight Smith MD  Pulmonary and Critical Care Medicine  [facility], [facility]  7/21/2017    11:00 AM
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
## 2017-07-21 NOTE — DISCHARGE PLACEMENT REQUEST
"Jay Degroot (57 y.o. Male)     Date of Birth Social Security Number Address Home Phone MRN    1960  [id number] [facility] [id number] [phone] [id number]    [employer] Marital Status          None Single       Admission Date Admission Type Admitting Provider Attending Provider Department, Room/Bed    7/14/17 Elective Mark Hawthorne MD Thomas, Bradley Glynn, MD [address], [address]    Discharge Date Discharge Disposition Discharge Destination                      Attending Provider: Mark Hawthorne MD     Allergies:  Penicillins    Isolation:  Contact   Infection:  MRSA (06/18/17)   Code Status:  FULL    Ht:  74\" (188 cm)   Wt:  213 lb 14.4 oz (97 kg)    Admission Cmt:  None   Principal Problem:  Chronic osteomyelitis of left femur [M86.652]                 Active Insurance as of 7/14/2017     Primary Coverage     Payor Plan Insurance Group Employer/Plan Group    ANTHEM MEDICARE REPLACEMENT ANTH MEDICARE ADVANTAGE [id number]     Payor Plan Address Payor Plan Phone Number Effective From Effective To    [address] [phone] 1/1/2017     [place] [address]       Subscriber Name Subscriber Birth Date Member ID       JAY DEGROOT 1960 [id number]                 Emergency Contacts      (Rel.) Home Phone Work Phone Mobile Phone    Stephanie Quick (Significant Other) [phone] -- [phone]            "

## 2017-07-21 NOTE — THERAPY TREATMENT NOTE
Acute Care - Physical Therapy Treatment Note  [facility]     Patient Name: Jay Blackwood  : 1960  MRN: [id number]  Today's Date: 2017  Onset of Illness/Injury or Date of Surgery Date: 17     Referring Physician: Christoph    Admreg Date: 2017    Visit Dx:    ICD-10-CM ICD-9-CM   1. Decreased mobility R26.89 781.99   2. Chronic osteomyelitis M86.60 730.10     Patient Active Problem List   Diagnosis   • OA (osteoarthritis) of knee   • Diabetes mellitus   • Hypertension   • A-fib   • Neuropathic pain   • PVD (peripheral vascular disease)   • HLD (hyperlipidemia)   • Chronic osteomyelitis of left femur   • Chronic osteomyelitis               Adult Rehabilitation Note       17 1119 17 0930       Rehab Assessment/Intervention    Discipline physical therapy assistant  -JW physical therapy assistant  -SHARI     Document Type therapy note (daily note)  -JW therapy note (daily note)  -SHARI     Subjective Information agree to therapy   confused  -JW agree to therapy  -SHARI     Patient Effort, Rehab Treatment  good  -SHARI     Symptoms Noted During/After Treatment other (see comments)   unble to focus on task  -JW      Precautions/Limitations fall precautions  -JW fall precautions  -SHARI     Specific Treatment Considerations L AKA Revision  -JW (L)AKA Revision  -SHARI     Recorded by [JW] Kelli Rodriguez PTA [SHARI] Sai Stock PTA     Vital Signs    Pre SpO2 (%) 95  -JW 98  -SHARI     O2 Delivery Pre Treatment supplemental O2  -JW supplemental O2   3L  -SHARI     Post SpO2 (%)  99  -SHARI     O2 Delivery Post Treatment  supplemental O2   3L  -SHARI     Recorded by [JW] Kelli Rodriguez PTA [SHARI] Sai Stock PTA     Pain Assessment    Pain Assessment No/denies pain  -JW 0-10  -SHARI     Pain Score  7  -SHARI     Post Pain Score  7  -SHARI     Pain Type  Chronic pain  -SHARI     Pain Location Incision  -JW Neck  -SHARI     Pain Orientation Left  -JW      Pain Intervention(s)  Ambulation/increased activity;Repositioned;Rest  -SHARI      Recorded by [JW] Kelli Rodriguez PTA [SHARI] Sai Stock PTA     Cognitive Assessment/Intervention    Current Cognitive/Communication Assessment impaired  -JW functional  -SHARI     Orientation Status oriented to;person;required verbal cueing (specifiy in comments)  -JW oriented x 4  -SHARI     Follows Commands/Answers Questions 50% of the time  -% of the time  -SHARI     Personal Safety decreased insight to deficits  -JW WNL/WFL  -SHARI     Personal Safety Interventions fall prevention program maintained  - fall prevention program maintained;gait belt;nonskid shoes/slippers when out of bed  -SHARI     Recorded by [JW] Kelli Rodriguez PTA [SHARI] Sai Stock PTA     Bed Mobility, Assessment/Treatment    Bed Mobility, Assistive Device bed rails  -JW bed rails;head of bed elevated  -SHARI     Bed Mobility, Scoot/Bridge, [place] moderate assist (50% patient effort);2 person assist required  - conditional independence  -SHARI     Bed Mob, Supine to Sit, [place] minimum assist (75% patient effort)  - conditional independence  -SHARI     Bed Mobility, Safety Issues decreased use of arms for pushing/pulling;decreased use of legs for bridging/pushing  -JW decreased use of legs for bridging/pushing  -SHARI     Bed Mobility, Impairments other (see comments)  -JW strength decreased  -SHARI     Bed Mobility, Comment Jerking movements,falling backward in sitting, not using UEs effectively for support  -JW      Recorded by [JW] Kelli Rodriguez PTA [SHARI] Sai Stock PTA     Transfer Assessment/Treatment    Transfers, Sit-Stand [place] not tested  -JW minimum assist (75% patient effort);verbal cues required;nonverbal cues required (demo/gesture)  -SHARI     Transfers, Stand-Sit [place]  contact guard assist;verbal cues required  -SHARI     Transfers, Sit-Stand-Sit, Assist Device  rolling walker  -SHARI     Transfer, Impairments  strength decreased;impaired balance  -SHARI     Transfer, Comment  Pt somewhat lethargic requiring  increased assist initially, improved as session progressed.   -SHARI     Recorded by [JW] Kelli Rodriguez PTA [SHARI] Sai Stock PTA     Gait Assessment/Treatment    Gait, [place] Level not tested  - contact guard assist;verbal cues required  -SHARI     Gait, Assistive Device  rolling walker  -SHARI     Gait, Distance (Feet)  60  -SHARI     Gait, Gait Deviations  edil decreased;limb motion velocity decreased;step length decreased  -SHARI     Gait, Safety Issues  step length decreased;balance decreased during turns  -SHARI     Gait, Impairments  strength decreased;impaired balance  -SHARI     Gait, Comment  Pt reported increased (R)leg fatigue following gait training, HEP reviewed for improved strength and endurance.   -SHARI     Recorded by [CADY] Kelli Rodriguez PTA [SHARI] Sai Stock PTA     Therapy Exercises    Right Lower Extremity  AROM:;10 reps;supine;heel slides;hip abduction/adduction;ankle pumps/circles  -SHARI     Left Lower Extremity  AROM:;10 reps;hip flexion;hip extension;hip abduction/adduction  -SHARI     Recorded by  [SHARI] Sai Stock PTA     Positioning and Restraints    Pre-Treatment Position in bed  -JW in bed  -SHARI     Post Treatment Position bed  - bed  -SHARI     In Bed supine;call light within reach;encouraged to call for assist;exit alarm on;notified nsg  -JW supine;call light within reach;encouraged to call for assist;exit alarm on  -SHARI     Recorded by [CADY] Kelli Rodriguez PTA [SHARI] Sai Stock PTA       User Key  (r) = Recorded By, (t) = Taken By, (c) = Cosigned By    Initials Name Effective Dates     Kelli Rodriguez PTA 02/18/16 -     SHARI Sai Stock PTA 04/24/15 -                 IP PT Goals       07/17/17 1310          Transfer Training PT LTG    Transfer Training PT LTG, Date Established 07/17/17  -KH (r) GERA (t) KH (c)      Transfer Training PT LTG, Time to Achieve 1 wk  -KH (r) GERA (t) KH (c)      Transfer Training PT LTG, Activity Type all transfers  -KH (r) GERA (t) KH (c)      Transfer Training PT  LTG, [place] Level contact guard assist  -KH (r) GERA (t) KH (c)      Transfer Training PT LTG, Assist Device --   Appropriate AD  -KH (r) GERA (t) KH (c)      Gait Training PT LTG    Gait Training Goal PT LTG, Date Established 07/17/17  -KH (r) GERA (t) KH (c)      Gait Training Goal PT LTG, Time to Achieve 1 wk  -KH (r) GERA (t) KH (c)      Gait Training Goal PT LTG, [place] Level contact guard assist;2 person assist required  -KH (r) GERA (t) KH (c)      Gait Training Goal PT LTG, Assist Device --   Appropriate AD  -KH (r) GERA (t) KH (c)      Gait Training Goal PT LTG, Distance to Achieve 20  -KH (r) GERA (t) KH (c)        User Key  (r) = Recorded By, (t) = Taken By, (c) = Cosigned By    Initials Name Provider Type    ARMANI Sifuentes, PT Physical Therapist    GERA Flowers, PT Student PT Student          Physical Therapy Education     Title: PT OT SLP Therapies (Active)     Topic: Physical Therapy (Active)     Point: Mobility training (Done)    Learning Progress Summary    Learner Readiness Method Response Comment Documented by Status   Patient Acceptance E VU,NR  SHARI 07/20/17 0958 Done    Acceptance E VU,NR  SHARI 07/18/17 0935 Done    Acceptance E VU,SALO  GERA 07/17/17 1310 Done               Point: Home exercise program (Done)    Learning Progress Summary    Learner Readiness Method Response Comment Documented by Status   Patient Acceptance E VU,NR  SHARI 07/20/17 0958 Done    Acceptance E VU,NR  SHARI 07/18/17 0935 Done    Acceptance E DEEJAY,SALO  GERA 07/17/17 1310 Done               Point: Body mechanics (Done)    Learning Progress Summary    Learner Readiness Method Response Comment Documented by Status   Patient Acceptance E VU,NR  SHARI 07/20/17 0958 Done    Acceptance E VU,NR  SHARI 07/18/17 0935 Done    Acceptance E VU,NR  GERA 07/17/17 1310 Done               Point: Precautions (Active)    Learning Progress Summary    Learner Readiness Method Response Comment Documented by Status   Patient Acceptance E NR  JW 07/21/17  1127 Active    Acceptance E VU,NR  SHARI 07/20/17 0958 Done    Acceptance E VU,NR  SHARI 07/18/17 0935 Done    Acceptance E VU,NR  GERA 07/17/17 1310 Done                      User Key     Initials Effective Dates Name Provider Type Discipline     02/18/16 -  Kelli Rodriguez PTA Physical Therapy Assistant PT    SHARI 04/24/15 -  Sai Stock PTA Physical Therapy Assistant PT    GERA 05/08/17 -  Reggie Flowers, PT Student PT Student PT                    PT Recommendation and Plan  Anticipated Equipment Needs At Discharge: walker  Anticipated Discharge Disposition: inpatient rehabilitation facility, home with home health  Planned Therapy Interventions: balance training, gait training, home exercise program, patient/family education, strengthening, transfer training  PT Frequency: daily  Plan of Care Review  Outcome Summary/Follow up Plan:  (slowed coordinaation, lethargy preventing safemobility today)          Outcome Measures       07/21/17 1100 07/20/17 1000       How much help from another person do you currently need...    Turning from your back to your side while in flat bed without using bedrails? 4  -JW 4  -SHARI     Moving from lying on back to sitting on the side of a flat bed without bedrails? 2  -JW 4  -SHARI     Moving to and from a bed to a chair (including a wheelchair)? 2  -JW 3  -SHARI     Standing up from a chair using your arms (e.g., wheelchair, bedside chair)? 2  -JW 3  -SHARI     Climbing 3-5 steps with a railing? 1  -JW 1  -SHARI     To walk in hospital room? 2  -JW 3  -SHARI     AM-PAC 6 Clicks Score 13  -JW 18  -SHARI     Functional Assessment    Outcome Measure Options  AM-PAC 6 Clicks Basic Mobility (PT)  -SHARI       User Key  (r) = Recorded By, (t) = Taken By, (c) = Cosigned By    Initials Name Provider Type     Kelli Rodriguez PTA Physical Therapy Assistant    SHARI Stock PTA Physical Therapy Assistant           Time Calculation:         PT Charges       07/21/17 1113          Time Calculation    Start Time 1055   -CADY      Stop Time 1113  -CADY      Time Calculation (min) 18 min  -CADY      PT Received On 07/21/17  -CADY      PT - Next Appointment 07/22/17  -CADY        User Key  (r) = Recorded By, (t) = Taken By, (c) = Cosigned By    Initials Name Provider Type    CADY Rodriguez PTA Physical Therapy Assistant          Therapy Charges for Today     Code Description Service Date Service Provider Modifiers Qty    40315072962 HC PT THER PROC EA 15 MIN 7/21/2017 Kelli Rodriguez PTA GP 1    40380398065 HC PT THER SUPP EA 15 MIN 7/21/2017 Kelli Rodriguez PTA GP 1          PT G-Codes  Outcome Measure Options: AM-PAC 6 Clicks Basic Mobility (PT)    Kelli Rodriguez PTA  7/21/2017

## 2017-07-22 LAB
ALBUMIN SERPL-MCNC: 2.1 G/DL (ref 3.5–5.2)
ANION GAP SERPL CALCULATED.3IONS-SCNC: 12.9 MMOL/L
BUN BLD-MCNC: 34 MG/DL (ref 6–20)
BUN/CREAT SERPL: 10.4 (ref 7–25)
CALCIUM SPEC-SCNC: 8.2 MG/DL (ref 8.6–10.5)
CHLORIDE SERPL-SCNC: 115 MMOL/L (ref 98–107)
CO2 SERPL-SCNC: 19.1 MMOL/L (ref 22–29)
CREAT BLD-MCNC: 3.28 MG/DL (ref 0.76–1.27)
GFR SERPL CREATININE-BSD FRML MDRD: 20 ML/MIN/1.73
GLUCOSE BLD-MCNC: 82 MG/DL (ref 65–99)
GLUCOSE BLDC GLUCOMTR-MCNC: 114 MG/DL (ref 70–130)
GLUCOSE BLDC GLUCOMTR-MCNC: 161 MG/DL (ref 70–130)
GLUCOSE BLDC GLUCOMTR-MCNC: 191 MG/DL (ref 70–130)
GLUCOSE BLDC GLUCOMTR-MCNC: 86 MG/DL (ref 70–130)
HBA1C MFR BLD: 6.38 % (ref 4.8–5.6)
PHOSPHATE SERPL-MCNC: 3.1 MG/DL (ref 2.5–4.5)
POTASSIUM BLD-SCNC: 3.2 MMOL/L (ref 3.5–5.2)
SODIUM BLD-SCNC: 147 MMOL/L (ref 136–145)

## 2017-07-22 PROCEDURE — 83036 HEMOGLOBIN GLYCOSYLATED A1C: CPT | Performed by: HOSPITALIST

## 2017-07-22 PROCEDURE — 25810000003 DEXTROSE 5 % WITH KCL 20 MEQ 20-5 MEQ/L-% SOLUTION: Performed by: SURGERY

## 2017-07-22 PROCEDURE — 99232 SBSQ HOSP IP/OBS MODERATE 35: CPT | Performed by: INTERNAL MEDICINE

## 2017-07-22 PROCEDURE — 97110 THERAPEUTIC EXERCISES: CPT

## 2017-07-22 PROCEDURE — 25010000002 CEFEPIME: Performed by: INTERNAL MEDICINE

## 2017-07-22 PROCEDURE — 80069 RENAL FUNCTION PANEL: CPT | Performed by: INTERNAL MEDICINE

## 2017-07-22 PROCEDURE — 82962 GLUCOSE BLOOD TEST: CPT

## 2017-07-22 PROCEDURE — 63710000001 INSULIN ASPART PER 5 UNITS: Performed by: SURGERY

## 2017-07-22 RX ORDER — METOPROLOL TARTRATE 5 MG/5ML
INJECTION INTRAVENOUS
Status: DISPENSED
Start: 2017-07-22 | End: 2017-07-23

## 2017-07-22 RX ORDER — NYSTATIN 100000 [USP'U]/G
POWDER TOPICAL EVERY 12 HOURS SCHEDULED
Status: DISCONTINUED | OUTPATIENT
Start: 2017-07-22 | End: 2017-07-25 | Stop reason: HOSPADM

## 2017-07-22 RX ORDER — POTASSIUM CHLORIDE 750 MG/1
40 CAPSULE, EXTENDED RELEASE ORAL ONCE
Status: COMPLETED | OUTPATIENT
Start: 2017-07-22 | End: 2017-07-22

## 2017-07-22 RX ORDER — HYDRALAZINE HYDROCHLORIDE 50 MG/1
50 TABLET, FILM COATED ORAL EVERY 8 HOURS SCHEDULED
Status: DISCONTINUED | OUTPATIENT
Start: 2017-07-22 | End: 2017-07-23

## 2017-07-22 RX ORDER — POTASSIUM CHLORIDE, DEXTROSE MONOHYDRATE 150; 5 MG/100ML; G/100ML
75 INJECTION, SOLUTION INTRAVENOUS CONTINUOUS
Status: DISPENSED | OUTPATIENT
Start: 2017-07-22 | End: 2017-07-23

## 2017-07-22 RX ORDER — HYDRALAZINE HYDROCHLORIDE 50 MG/1
50 TABLET, FILM COATED ORAL ONCE
Status: COMPLETED | OUTPATIENT
Start: 2017-07-22 | End: 2017-07-22

## 2017-07-22 RX ADMIN — AMLODIPINE BESYLATE 5 MG: 5 TABLET ORAL at 09:24

## 2017-07-22 RX ADMIN — VITAMIN D, TAB 1000IU (100/BT) 5000 UNITS: 25 TAB at 09:24

## 2017-07-22 RX ADMIN — HYDRALAZINE HYDROCHLORIDE 50 MG: 50 TABLET, FILM COATED ORAL at 19:47

## 2017-07-22 RX ADMIN — INSULIN DETEMIR 18 UNITS: 100 INJECTION, SOLUTION SUBCUTANEOUS at 21:57

## 2017-07-22 RX ADMIN — SODIUM BICARBONATE 1300 MG: 650 TABLET ORAL at 09:24

## 2017-07-22 RX ADMIN — HYDRALAZINE HYDROCHLORIDE 25 MG: 25 TABLET ORAL at 13:59

## 2017-07-22 RX ADMIN — POTASSIUM CHLORIDE AND DEXTROSE MONOHYDRATE 50 ML/HR: 150; 5 INJECTION, SOLUTION INTRAVENOUS at 13:59

## 2017-07-22 RX ADMIN — CEFEPIME 2 G: 2 INJECTION, POWDER, FOR SOLUTION INTRAVENOUS at 10:24

## 2017-07-22 RX ADMIN — NYSTATIN: 100000 POWDER TOPICAL at 23:50

## 2017-07-22 RX ADMIN — NYSTATIN: 100000 POWDER TOPICAL at 16:22

## 2017-07-22 RX ADMIN — APIXABAN 5 MG: 5 TABLET, FILM COATED ORAL at 21:59

## 2017-07-22 RX ADMIN — FAMOTIDINE 20 MG: 20 TABLET, FILM COATED ORAL at 09:24

## 2017-07-22 RX ADMIN — VANCOMYCIN HYDROCHLORIDE 750 MG: 750 INJECTION, POWDER, LYOPHILIZED, FOR SOLUTION INTRAVENOUS at 11:53

## 2017-07-22 RX ADMIN — HYDRALAZINE HYDROCHLORIDE 25 MG: 25 TABLET ORAL at 06:58

## 2017-07-22 RX ADMIN — SODIUM BICARBONATE 1300 MG: 650 TABLET ORAL at 16:22

## 2017-07-22 RX ADMIN — CARVEDILOL 3.12 MG: 3.12 TABLET, FILM COATED ORAL at 21:58

## 2017-07-22 RX ADMIN — POTASSIUM CHLORIDE 40 MEQ: 750 CAPSULE, EXTENDED RELEASE ORAL at 11:54

## 2017-07-22 RX ADMIN — SODIUM BICARBONATE 1300 MG: 650 TABLET ORAL at 21:59

## 2017-07-22 RX ADMIN — NICOTINE 1 PATCH: 14 PATCH, EXTENDED RELEASE TRANSDERMAL at 22:00

## 2017-07-22 RX ADMIN — HYDRALAZINE HYDROCHLORIDE 50 MG: 50 TABLET, FILM COATED ORAL at 21:58

## 2017-07-22 RX ADMIN — ASPIRIN 81 MG: 81 TABLET ORAL at 09:23

## 2017-07-22 RX ADMIN — APIXABAN 5 MG: 5 TABLET, FILM COATED ORAL at 09:24

## 2017-07-22 RX ADMIN — ATORVASTATIN CALCIUM 40 MG: 20 TABLET, FILM COATED ORAL at 09:23

## 2017-07-22 RX ADMIN — CARVEDILOL 3.12 MG: 3.12 TABLET, FILM COATED ORAL at 09:24

## 2017-07-22 RX ADMIN — METOROPROLOL TARTRATE 5 MG: 5 INJECTION, SOLUTION INTRAVENOUS at 16:50

## 2017-07-22 RX ADMIN — SERTRALINE 100 MG: 100 TABLET, FILM COATED ORAL at 09:24

## 2017-07-22 RX ADMIN — INSULIN ASPART 2 UNITS: 100 INJECTION, SOLUTION INTRAVENOUS; SUBCUTANEOUS at 23:49

## 2017-07-22 RX ADMIN — NICOTINE 1 PATCH: 14 PATCH, EXTENDED RELEASE TRANSDERMAL at 00:05

## 2017-07-22 RX ADMIN — TAMSULOSIN HYDROCHLORIDE 0.8 MG: 0.4 CAPSULE ORAL at 09:24

## 2017-07-22 NOTE — PROGRESS NOTES
The patient is somewhat drowsy this morning although he answers all questions appropriately.  He states he's not having much in terms of pain.    His dressing is dry and intact.  No drainage noted.    Blood cultures remain negative so far.  Mixed bacteria noted on sputum cultures.    Multiple medical issues in addition to his chronic osteomyelitis, i.e. pneumonia, A. fib, chronic kidney disease.  Appreciate consultants help with this patient.

## 2017-07-22 NOTE — PLAN OF CARE
Problem: Patient Care Overview (Adult)  Goal: Plan of Care Review  Outcome: Ongoing (interventions implemented as appropriate)    07/22/17 0349   Coping/Psychosocial Response Interventions   Plan Of Care Reviewed With patient   Patient Care Overview   Progress progress toward functional goals as expected   Outcome Evaluation   Outcome Summary/Follow up Plan VSS, pt lethargic throughout shift, MD'S notified and is aware, NIH assessed. Patient did not receive any pain medication on this shift d/t decreased renal functions. Will continue to monitor.          Problem: Fall Risk (Adult)  Goal: Absence of Falls  Outcome: Ongoing (interventions implemented as appropriate)    Problem: Pain, Acute (Adult)  Goal: Acceptable Pain Control/Comfort Level  Outcome: Ongoing (interventions implemented as appropriate)    Problem: Infection, Risk/Actual (Adult)  Goal: Infection Prevention/Resolution  Outcome: Ongoing (interventions implemented as appropriate)

## 2017-07-22 NOTE — NURSING NOTE
Entered room wife giving patient drink of water.  Patient coughing after drink of water.  Educated patient diet restriction and speech therapy order for Nectar thick liquids which are at bedside.  Patient and

## 2017-07-22 NOTE — PROGRESS NOTES
"   LOS: 8 days    Patient Care Team:  JOELLE Pak as PCP - General (Family Medicine)  Prosper Oh MD as Consulting Physician (Cardiology)    Chief Complaint:  No chief complaint on file.      Subjective   F/u KRISTA     Interval History:   Discussed with nurse.  Patient is more alert today.    Sitting in bed.  Eating breakfast.  No distress.    Neck and shoulder pain.    denies soa, cp, n/v    Objective     Vital Signs  Temp:  [97.8 °F (36.6 °C)-100.1 °F (37.8 °C)] 99.3 °F (37.4 °C)  Heart Rate:  [60-79] 60  Resp:  [13-20] 20  BP: (171-202)/(65-96) 172/65    Flowsheet Rows         First Filed Value    Admission Height  74\" (188 cm) Documented at 07/14/2017 1002    Admission Weight  220 lb (99.8 kg) Documented at 07/14/2017 1002          I/O this shift:  In: 340 [P.O.:240; IV Piggyback:100]  Out: -   I/O last 3 completed shifts:  In: 210 [P.O.:210]  Out: 3775 [Urine:3775]    Intake/Output Summary (Last 24 hours) at 07/22/17 1050  Last data filed at 07/22/17 1024   Gross per 24 hour   Intake              340 ml   Output             2575 ml   Net            -2235 ml       Physical Exam:  gen sitting upright, No distress.  Neck supple no jvd  Lungs CTA bilat no rales  CV RRR  abd soft nt/nd  +segal  vasc LLE AKA, trace RLE edema      Results Review:      Results from last 7 days  Lab Units 07/22/17  0516 07/21/17  0620 07/20/17  0542 07/19/17  0603   SODIUM mmol/L 147* 143 140 140   POTASSIUM mmol/L 3.2* 3.7 4.1 4.3   CHLORIDE mmol/L 115* 112* 108* 110*   CO2 mmol/L 19.1* 17.1* 17.1* 13.9*   BUN mg/dL 34* 37* 42* 41*   CREATININE mg/dL 3.28* 3.46* 3.77* 4.28*   CALCIUM mg/dL 8.2* 7.8* 7.9* 7.6*   BILIRUBIN mg/dL  --   --   --  0.2   ALK PHOS U/L  --   --   --  77   ALT (SGPT) U/L  --   --   --  6   AST (SGOT) U/L  --   --   --  16   GLUCOSE mg/dL 82 166* 165* 113*       Estimated Creatinine Clearance: 34.1 mL/min (by C-G formula based on Cr of 3.28).      Results from last 7 days  Lab Units " 07/22/17  0516 07/21/17  0620 07/18/17  0452 07/17/17  0458   MAGNESIUM mg/dL  --  1.7 1.9 1.7   PHOSPHORUS mg/dL 3.1 4.8* 5.4* 4.9*         Results from last 7 days  Lab Units 07/21/17  0620 07/18/17  0452   URIC ACID mg/dL 9.2* 9.4*         Results from last 7 days  Lab Units 07/21/17  0620 07/20/17  0542 07/19/17  0603 07/18/17  2350 07/18/17  0452   WBC 10*3/mm3 10.74* 12.16* 12.33* 12.02* 7.75   HEMOGLOBIN g/dL 8.1* 8.3* 9.2* 9.4* 7.7*   PLATELETS 10*3/mm3 154 141 150 147 139*         Results from last 7 days  Lab Units 07/18/17  2350   INR  1.18*         Imaging Results (last 24 hours)     ** No results found for the last 24 hours. **          amLODIPine 5 mg Oral Q24H   apixaban 5 mg Oral Q12H   aspirin 81 mg Oral Daily   atorvastatin 40 mg Oral Daily   carvedilol 3.125 mg Oral Q12H   cefepime 2 g Intravenous Q24H   cholecalciferol 5,000 Units Oral Daily   famotidine 20 mg Intravenous Daily   Or      famotidine 20 mg Oral Daily   hydrALAZINE 25 mg Oral Q8H   insulin aspart 0-9 Units Subcutaneous 4x Daily With Meals & Nightly   insulin detemir 18 Units Subcutaneous Nightly   nicotine 1 patch Transdermal Q24H   sertraline 100 mg Oral Daily   sodium bicarbonate 1,300 mg Oral TID   tamsulosin 0.8 mg Oral Daily   vancomycin 750 mg Intravenous Q24H       Pharmacy to dose vancomycin        Medication Review:   Current Facility-Administered Medications   Medication Dose Route Frequency Provider Last Rate Last Dose   • amLODIPine (NORVASC) tablet 5 mg  5 mg Oral Q24H Romeo Agee MD   5 mg at 07/22/17 0924   • apixaban (ELIQUIS) tablet 5 mg  5 mg Oral Q12H Mark Hawthorne MD   5 mg at 07/22/17 0924   • aspirin EC tablet 81 mg  81 mg Oral Daily Mark Hawthorne MD   81 mg at 07/22/17 0923   • atorvastatin (LIPITOR) tablet 40 mg  40 mg Oral Daily Mark Hawthorne MD   40 mg at 07/22/17 0923   • carvedilol (COREG) tablet 3.125 mg  3.125 mg Oral Q12H Romeo Agee MD   3.125 mg at 07/22/17  0924   • cefepime (MAXIPIME) 2 g/100 mL 0.9% NS (mbp)  2 g Intravenous Q24H Felipe Foster MD   2 g at 07/22/17 1024   • cholecalciferol (VITAMIN D3) tablet 5,000 Units  5,000 Units Oral Daily Heidi Medel MD   5,000 Units at 07/22/17 0924   • dextrose (D50W) solution 25 g  25 g Intravenous Q15 Min PRN Mark Hawthorne MD   25 g at 07/20/17 1418   • dextrose (GLUTOSE) oral gel 15 g  15 g Oral Q15 Min PRN Mark Hawthorne MD       • famotidine (PEPCID) injection 20 mg  20 mg Intravenous Daily Mark Hawthorne MD        Or   • famotidine (PEPCID) tablet 20 mg  20 mg Oral Daily Mark Hawthorne MD   20 mg at 07/22/17 0924   • glucagon (human recombinant) (GLUCAGEN DIAGNOSTIC) injection 1 mg  1 mg Subcutaneous Q15 Min PRN Mark Hawthorne MD       • hydrALAZINE (APRESOLINE) tablet 25 mg  25 mg Oral Q8H Mikey Clarke MD   25 mg at 07/22/17 0658   • insulin aspart (novoLOG) injection 0-9 Units  0-9 Units Subcutaneous 4x Daily With Meals & Nightly Mark Hawthorne MD   2 Units at 07/21/17 2058   • insulin detemir (LEVEMIR) injection 18 Units  18 Units Subcutaneous Nightly Carlin Barry MD   18 Units at 07/21/17 2058   • morphine injection 4 mg  4 mg Intravenous Q2H PRN Leticia Stanton MD   4 mg at 07/15/17 1235   • naloxone (NARCAN) injection 0.4 mg  0.4 mg Intravenous Q5 Min PRN Leticia Stanton MD   0.4 mg at 07/19/17 0042   • nicotine (NICODERM CQ) 14 MG/24HR patch 1 patch  1 patch Transdermal Q24H Leticia Stanton MD   1 patch at 07/22/17 0005   • nitroglycerin (NITROSTAT) SL tablet 0.4 mg  0.4 mg Sublingual Q5 Min PRN Mark Hawthorne MD       • ondansetron (ZOFRAN) tablet 4 mg  4 mg Oral Q6H PRN Mark Hawthorne MD        Or   • ondansetron ODT (ZOFRAN-ODT) disintegrating tablet 4 mg  4 mg Oral Q6H PRN Mark Hawthorne MD        Or   • ondansetron (ZOFRAN) injection 4 mg  4 mg Intravenous Q6H PRN Mark Hawthorne MD   4 mg at  07/16/17 1048   • oxyCODONE-acetaminophen (PERCOCET)  MG per tablet 2 tablet  2 tablet Oral Q6H PRN Mark Hawthorne MD   2 tablet at 07/20/17 0157   • oxyCODONE-acetaminophen (PERCOCET) 5-325 MG per tablet 1 tablet  1 tablet Oral Q4H PRN Mark Hawthorne MD   1 tablet at 07/20/17 2140   • Pharmacy to dose vancomycin   Does not apply Continuous PRN Mark Hawthorne MD       • sennosides-docusate sodium (SENOKOT-S) 8.6-50 MG tablet 2 tablet  2 tablet Oral BID PRN Mark Hawthorne MD       • sertraline (ZOLOFT) tablet 100 mg  100 mg Oral Daily Mark Hawthorne MD   100 mg at 07/22/17 0924   • sodium bicarbonate tablet 1,300 mg  1,300 mg Oral TID Mikey Clarke MD   1,300 mg at 07/22/17 0924   • tamsulosin (FLOMAX) 24 hr capsule 0.8 mg  0.8 mg Oral Daily Heidi Medel MD   0.8 mg at 07/22/17 0924   • vancomycin 750 mg/250 mL 0.9% NS add-vantage  750 mg Intravenous Q24H Mark Hawthorne MD   750 mg at 07/21/17 1307       Assessment/Plan      1.  Acute kidney injury on chronic kidney disease stage 3-4 - questionable urinary retention vs ATN in setting of PNA   --  Creatinine is better. (peak 4.3; BL 2.0 as of 7/5/17).  Volume status is adequate.  2.  Urinary retention - segal in place, voiding trials soon.    3.  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  Covered.  4.  HTN - BP elevated.  In pain.  5.  Peripheral vascular disease and carotid disease.  6.  acute encephalopathy - better   7.  Metabolic acidosis -on by mouth sodium bicarb, stable.  8.  Anemia - Hb down to 8.1, multifactorial.  Including iron deficiency.  Iron saturation 7% on 7/17/17.  Consider IV iron if infectious issues are under control.  9.  Sepsis, PNA - cefepime & vanc; pharmacy dosing latter  10.  Hypernatremia, poor by mouth water intake due to dysphagia.  Patient hates thickened water.  Will add D5W with 20 potassium chloride at 75 cc an hour and reevaluate in a.m.  11.  Hypokalemia.  We'll  replace orally and IV and recheck in a.m.  Check magnesium.  Discussed with patient and nursing staff.  Will follow.    Sharron Mcneill MD  07/22/17  10:50 AM

## 2017-07-22 NOTE — PROGRESS NOTES
[facility] INPATIENT PROGRESS NOTE         [address]    7/22/2017      PATIENT IDENTIFICATION:   Name:  Jay Blackwood      MRN:  [id number]     57 y.o.  male             LOS 8    Reason for visit: f/u PNA      SUBJECTIVE:    No cp, productive cough.  Less soa. Wife at the bed side     Objective   OBJECTIVE:    Vital Sign Min/Max for last 24 hours  Temp  Min: 97.4 °F (36.3 °C)  Max: 100.1 °F (37.8 °C)   BP  Min: 172/65  Max: 194/90   Pulse  Min: 56  Max: 78   Resp  Min: 16  Max: 20   SpO2  Min: 90 %  Max: 96 %   Flow (L/min)  Min: 2  Max: 3   No Data Recorded           Body mass index is 27.46 kg/(m^2).    Intake/Output Summary (Last 24 hours) at 07/22/17 1607  Last data filed at 07/22/17 1356   Gross per 24 hour   Intake              460 ml   Output             2525 ml   Net            -2065 ml     \  Exam:  GEN:  No distress, appears stated age  EYES:   PERRL, anicteric sclera  ENT:    External ears/nose normal, OP clear  NECK:  No adenopathy, midline trachea  LUNGS: Normal chest on inspection, palpation and scattered course BS at bases on auscultation  CV:  Normal S1S2, without murmur  ABD:  Non tender, non distended, no hepatosplenomegaly, +BS  EXT:  No edema, cyanosis or clubbing    Scheduled meds:      amLODIPine 5 mg Oral Q24H   apixaban 5 mg Oral Q12H   aspirin 81 mg Oral Daily   atorvastatin 40 mg Oral Daily   carvedilol 3.125 mg Oral Q12H   cefepime 2 g Intravenous Q24H   cholecalciferol 5,000 Units Oral Daily   famotidine 20 mg Intravenous Daily   Or      famotidine 20 mg Oral Daily   hydrALAZINE 25 mg Oral Q8H   insulin aspart 0-9 Units Subcutaneous 4x Daily With Meals & Nightly   insulin detemir 18 Units Subcutaneous Nightly   nicotine 1 patch Transdermal Q24H   nystatin  Topical Q12H   sertraline 100 mg Oral Daily   sodium bicarbonate 1,300 mg Oral TID   tamsulosin 0.8 mg Oral Daily   vancomycin 750 mg Intravenous Q24H     IV meds:                          dextrose 5 % with KCl  20 mEq 50 mL/hr Last Rate: 50 mL/hr (07/22/17 1359)   Pharmacy to dose vancomycin       Data Review:    Results from last 7 days  Lab Units 07/22/17  0516 07/21/17  0620 07/20/17  0542 07/19/17  0603 07/18/17  0452   SODIUM mmol/L 147* 143 140 140 137   POTASSIUM mmol/L 3.2* 3.7 4.1 4.3 4.1   CHLORIDE mmol/L 115* 112* 108* 110* 107   CO2 mmol/L 19.1* 17.1* 17.1* 13.9* 14.1*   BUN mg/dL 34* 37* 42* 41* 42*   CREATININE mg/dL 3.28* 3.46* 3.77* 4.28* 4.23*   GLUCOSE mg/dL 82 166* 165* 113* 83   CALCIUM mg/dL 8.2* 7.8* 7.9* 7.6* 7.7*         Estimated Creatinine Clearance: 34.1 mL/min (by C-G formula based on Cr of 3.28).    Results from last 7 days  Lab Units 07/21/17  0620 07/20/17  0542 07/19/17  0603 07/18/17  2350 07/18/17  0452   WBC 10*3/mm3 10.74* 12.16* 12.33* 12.02* 7.75   HEMOGLOBIN g/dL 8.1* 8.3* 9.2* 9.4* 7.7*   PLATELETS 10*3/mm3 154 141 150 147 139*       Results from last 7 days  Lab Units 07/18/17  2350   INR  1.18*       Results from last 7 days  Lab Units 07/19/17  0603   ALT (SGPT) U/L 6   AST (SGOT) U/L 16       Results from last 7 days  Lab Units 07/21/17  1248 07/19/17  0705 07/19/17  0050   PH, ARTERIAL pH units 7.320* 7.240* 7.318*   PO2 ART mm Hg 75.5* 65.8* 48.3*   PCO2, ARTERIAL mm Hg 33.1* 34.6* 29.9*   HCO3 ART mmol/L 17.0* 14.8* 15.3*       Assessment   ASSESSMENT:   Bilateral lower lobe pneumonia/pneumonitis with signs of potential aspiration component  Acute hypoxemic respiratory failure secondary to pneumonia  NORY: Previously intolerant positive airway pressure  Metabolic acidosis with anion gap  Left stump osteomyelitis with abscess status post revision and left above-knee amputation 7/14  History MRSA  Diabetes mellitus type 2  Acute on chronic kidney disease  Tobacco abuse: Suspect component of COPD      PLAN:  Abx per ID  Continue clearance measures.  Encourage quit smoking.      David Mercer MD  Pulmonary and Critical Care Medicine  [facility],  [facility]  7/22/2017

## 2017-07-22 NOTE — PROGRESS NOTES
LOS: 8 days   Patient Care Team:  JOELLE Pak as PCP - General (Family Medicine)  Prosper Oh MD as Consulting Physician (Cardiology)    Chief Complaint: Follow-up for paroxysmal atrial fibrillation.      Interval History: Sinus rhythm.  Started back on Eliquis. I got called about HTN.      Vital Signs:  Temp:  [97.4 °F (36.3 °C)-100.1 °F (37.8 °C)] 97.9 °F (36.6 °C)  Heart Rate:  [56-78] 56  Resp:  [16-20] 18  BP: (172-194)/(65-96) 193/86    Intake/Output Summary (Last 24 hours) at 07/22/17 1641  Last data filed at 07/22/17 1356   Gross per 24 hour   Intake              460 ml   Output             2525 ml   Net            -2065 ml       Physical Exam:   General Appearance:    No acute distress, alert and oriented x4   Lungs:     Clear to auscultation bilaterally     Heart:    Regular rhythm and normal rate.  No murmurs, gallops, or       rubs.   Abdomen:     Soft, non-tender, non-distended.    Extremities:   Status post left AKA     Results Review:      Results from last 7 days  Lab Units 07/22/17  0516   SODIUM mmol/L 147*   POTASSIUM mmol/L 3.2*   CHLORIDE mmol/L 115*   CO2 mmol/L 19.1*   BUN mg/dL 34*   CREATININE mg/dL 3.28*   GLUCOSE mg/dL 82   CALCIUM mg/dL 8.2*       Results from last 7 days  Lab Units 07/16/17  2333   TROPONIN T ng/mL 0.047*       Results from last 7 days  Lab Units 07/21/17  0620   WBC 10*3/mm3 10.74*   HEMOGLOBIN g/dL 8.1*   HEMATOCRIT % 24.5*   PLATELETS 10*3/mm3 154       Results from last 7 days  Lab Units 07/21/17  0620 07/20/17  0542 07/19/17  2133  07/18/17  2350   INR   --   --   --   --  1.18*   APTT seconds 61.0* 80.3* 54.5*  < > 41.2*   < > = values in this interval not displayed.        Results from last 7 days  Lab Units 07/21/17  0620   MAGNESIUM mg/dL 1.7           I reviewed the patient's new clinical results.        Assessment:  1.  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 Acute blood  loss anemia post-op (requiring transfusion)  4. Acute on chronic kidney injury  5. Paroxysmal atrial fibrillation  6. Coronary artery disease with prior stenting   7. EF 40-45% by echo 11/16  8. PVD with carotid disease  9. Diabetes    Plan:  -Started back on Eliquis per Dr. Hawthorne.  Off heparin.  -Continue Coreg - in NSR.      He is hypertensive and not taking POs well.  I have substituted metoprolol tartrate 5 mg IV Q 6 hours for HTN    Christoph Martinez MD  07/22/17  4:41 PM

## 2017-07-22 NOTE — NURSING NOTE
Patient had taken off his dressing to his left stump, patient had dressing in his hand.  Staples intact, no redness, no drainage, no swelling noted.  Painted site with betadine, placed 4x4 gauze on incision, wrapped with kerlix x2.  Will continue to monitor.

## 2017-07-22 NOTE — PLAN OF CARE
Problem: Patient Care Overview (Adult)  Goal: Plan of Care Review  Outcome: Ongoing (interventions implemented as appropriate)    07/22/17 1223   Coping/Psychosocial Response Interventions   Plan Of Care Reviewed With patient   Patient Care Overview   Progress progress toward functional goals as expected         Problem: Fall Risk (Adult)  Goal: Absence of Falls  Outcome: Ongoing (interventions implemented as appropriate)    07/22/17 1223   Fall Risk (Adult)   Absence of Falls making progress toward outcome         Problem: Pain, Acute (Adult)  Goal: Acceptable Pain Control/Comfort Level  Outcome: Ongoing (interventions implemented as appropriate)    07/22/17 1223   Pain, Acute (Adult)   Acceptable Pain Control/Comfort Level making progress toward outcome         Problem: Infection, Risk/Actual (Adult)  Goal: Infection Prevention/Resolution  Outcome: Ongoing (interventions implemented as appropriate)    07/22/17 1223   Infection, Risk/Actual (Adult)   Infection Prevention/Resolution making progress toward outcome

## 2017-07-22 NOTE — PROGRESS NOTES
Name: Jay Blackwood ADMIT: 2017   : 1960  PCP: JOELLE Pak    MRN: [id number] LOS: 8 days   AGE/SEX: 57 y.o. male  ROOM: [facility]     Subjective   Subjective  Says he's doing okay.  Felt poorly earlier today.    Objective   Vital Signs  Temp:  [97.8 °F (36.6 °C)-100.1 °F (37.8 °C)] 99.3 °F (37.4 °C)  Heart Rate:  [60-79] 60  Resp:  [13-20] 20  BP: (171-202)/(65-96) 172/65  SpO2:  [90 %-95 %] 90 %  on  Flow (L/min):  [2-3] 2;   O2 Device: room air  Body mass index is 27.46 kg/(m^2).    Physical Exam   Constitutional: He is oriented to person, place, and time. No distress.   Cardiovascular: Normal rate and regular rhythm.    No murmur heard.  Pulmonary/Chest: Effort normal and breath sounds normal.   Abdominal: Soft. Bowel sounds are normal. He exhibits no distension. There is no tenderness.   Musculoskeletal: Normal range of motion. He exhibits no edema.   Left AKA   Neurological: He is alert and oriented to person, place, and time.   Skin: Skin is warm and dry. He is not diaphoretic.       Results Review:       I reviewed the patient's new clinical results.    Results from last 7 days  Lab Units 17  0620 17  0542 17  0603 17  2350 17  0452 17  0458 17  0518   WBC 10*3/mm3 10.74* 12.16* 12.33* 12.02* 7.75 8.85 7.62   HEMOGLOBIN g/dL 8.1* 8.3* 9.2* 9.4* 7.7* 8.2* 9.0*   PLATELETS 10*3/mm3 154 141 150 147 139* 133* 152       Results from last 7 days  Lab Units 17  0516 17  0620 17  0542 17  0603 17  0452 17  0458 17  2333 17  0518   SODIUM mmol/L 147* 143 140 140 137 137  --  138   POTASSIUM mmol/L 3.2* 3.7 4.1 4.3 4.1 4.4 4.4 4.3   CHLORIDE mmol/L 115* 112* 108* 110* 107 111*  --  109*   CO2 mmol/L 19.1* 17.1* 17.1* 13.9* 14.1* 13.3*  --  14.9*   BUN mg/dL 34* 37* 42* 41* 42* 41*  --  33*   CREATININE mg/dL 3.28* 3.46* 3.77* 4.28* 4.23* 4.14*  --  3.63*   GLUCOSE mg/dL 82 166* 165* 113* 83 95  --  130*    Estimated Creatinine Clearance: 34.1 mL/min (by C-G formula based on Cr of 3.28).    Results from last 7 days  Lab Units 07/22/17  0516 07/21/17  0620 07/20/17  0542 07/19/17  0603 07/18/17  0452 07/17/17  0458 07/16/17  2333 07/16/17  0518   CALCIUM mg/dL 8.2* 7.8* 7.9* 7.6* 7.7* 7.7*  --  7.7*   ALBUMIN g/dL 2.10* 2.10*  --  2.20* 2.50* 2.50*  --   --    MAGNESIUM mg/dL  --  1.7  --   --  1.9 1.7 1.6  --    PHOSPHORUS mg/dL 3.1 4.8*  --   --  5.4* 4.9*  --   --        Lab Results   Component Value Date    HGBA1C 6.38 (H) 07/22/2017    HGBA1C 7.89 (H) 10/26/2016     Glucose   Date/Time Value Ref Range Status   07/22/2017 0724 86 70 - 130 mg/dL Final   07/21/2017 2050 184 (H) 70 - 130 mg/dL Final   07/21/2017 1640 132 (H) 70 - 130 mg/dL Final   07/21/2017 1239 208 (H) 70 - 130 mg/dL Final   07/21/2017 1130 210 (H) 70 - 130 mg/dL Final   07/21/2017 0718 169 (H) 70 - 130 mg/dL Final   07/21/2017 0318 191 (H) 70 - 130 mg/dL Final   07/20/2017 2125 175 (H) 70 - 130 mg/dL Final         amLODIPine 5 mg Oral Q24H   apixaban 5 mg Oral Q12H   aspirin 81 mg Oral Daily   atorvastatin 40 mg Oral Daily   carvedilol 3.125 mg Oral Q12H   cefepime 2 g Intravenous Q24H   cholecalciferol 5,000 Units Oral Daily   famotidine 20 mg Intravenous Daily   Or      famotidine 20 mg Oral Daily   hydrALAZINE 25 mg Oral Q8H   insulin aspart 0-9 Units Subcutaneous 4x Daily With Meals & Nightly   insulin detemir 18 Units Subcutaneous Nightly   nicotine 1 patch Transdermal Q24H   sertraline 100 mg Oral Daily   sodium bicarbonate 1,300 mg Oral TID   tamsulosin 0.8 mg Oral Daily   vancomycin 750 mg Intravenous Q24H       Pharmacy to dose vancomycin          Assessment/Plan   Assessment:     Active Hospital Problems (** Indicates Principal Problem)    Diagnosis Date Noted   • **Chronic osteomyelitis of left femur [M86.652] 07/14/2017   • Diabetes mellitus type 2, insulin dependent [E11.9, Z79.4] 07/21/2017   • Chronic osteomyelitis [M86.60]  07/14/2017   • A-fib [I48.91] 10/26/2016   • PVD (peripheral vascular disease) [I73.9] 10/26/2016      Resolved Hospital Problems    Diagnosis Date Noted Date Resolved   No resolved problems to display.       Plan:   - POD#8 REVISION LT AMPUTATION ABOVE KNEE  - Continue patient's long acting insulin - LEVEMIR 18 units SC nightly.  - NOVOLOG scheduled with meals along with moderate dose correctional factor as needed.  - Check glucose QAC and QHS. For any BG less than 70 mg/dL, treat per hospital protocol.  BS slightly low this morning.  Will order a bedtime snack.  - HgbA1C at goal.    - NCS diet  - Nephrology following.      Carlin Barry MD  [facility]  07/22/17  10:17 AM

## 2017-07-22 NOTE — THERAPY TREATMENT NOTE
Acute Care - Physical Therapy Treatment Note  [facility]     Patient Name: Jya Blackwood  : 1960  MRN: [id number]  Today's Date: 2017  Onset of Illness/Injury or Date of Surgery Date: 17     Referring Physician: Christoph Rivers Date: 2017    Visit Dx:    ICD-10-CM ICD-9-CM   1. Decreased mobility R26.89 781.99   2. Chronic osteomyelitis M86.60 730.10     Patient Active Problem List   Diagnosis   • OA (osteoarthritis) of knee   • Diabetes mellitus   • Hypertension   • A-fib   • Neuropathic pain   • PVD (peripheral vascular disease)   • HLD (hyperlipidemia)   • Chronic osteomyelitis of left femur   • Chronic osteomyelitis   • Diabetes mellitus type 2, insulin dependent               Adult Rehabilitation Note       17 1300 17 1119 17 0930    Rehab Assessment/Intervention    Discipline physical therapy assistant  - physical therapy assistant  -JW physical therapy assistant  -SHARI    Document Type therapy note (daily note)  - therapy note (daily note)  - therapy note (daily note)  -SHARI    Subjective Information agree to therapy;weakness;fatigue;pain  - agree to therapy   confused  - agree to therapy  -SHARI    Patient Effort, Rehab Treatment   good  -SHARI    Symptoms Noted During/After Treatment none  - other (see comments)   unble to focus on task  -     Precautions/Limitations fall precautions  - fall precautions  -JW fall precautions  -SHARI    Precautions/Limitations, Vision WFL  -      Precautions/Limitations, Hearing WFL  -      Specific Treatment Considerations L AKA revision  - L AKA Revision  - (L)AKA Revision  -SHARI    Recorded by [RH] Sea Piedra, PTA [JW] Kelli Rodriguez, PTA [SHARI] Sai Stock PTA    Vital Signs    Pre SpO2 (%) 95  -RH 95  -JW 98  -SHARI    O2 Delivery Pre Treatment room air  -RH supplemental O2  -JW supplemental O2   3L  -SHARI    Post SpO2 (%)   99  -SHARI    O2 Delivery Post Treatment   supplemental O2   3L  -SHARI    Recorded by [RH]  Sea Piedra PTA [JW] Kelli Rodriguez PTA [SHARI] Sai Stock PTA    Pain Assessment    Pain Assessment 0-10  - No/denies pain  - 0-10  -SHARI    Pain Score 6  -RH  7  -SHARI    Post Pain Score   7  -SHARI    Pain Type   Chronic pain  -SHARI    Pain Location --   neck/shoulder/knee/R resudial limb  - Incision  - Neck  -SHARI    Pain Orientation  Left  -     Pain Intervention(s)   Ambulation/increased activity;Repositioned;Rest  -SHARI    Recorded by [RH] Sea Piedra PTA [JW] Kelli Rodriguez PTA [SHARI] Sai Stock PTA    Cognitive Assessment/Intervention    Current Cognitive/Communication Assessment functional  - impaired  - functional  -SHARI    Orientation Status  oriented to;person;required verbal cueing (specifiy in comments)  - oriented x 4  -SHARI    Follows Commands/Answers Questions 100% of the time;needs cueing  - 50% of the time  - 100% of the time  -SHARI    Personal Safety mild impairment  - decreased insight to deficits  - WNL/WFL  -SHARI    Personal Safety Interventions fall prevention program maintained  - fall prevention program maintained  - fall prevention program maintained;gait belt;nonskid shoes/slippers when out of bed  -HSARI    Recorded by [RH] Sea Piedra PTA [JW] Kelli Rodriguez PTA [SHARI] Sai Stock PTA    Bed Mobility, Assessment/Treatment    Bed Mobility, Assistive Device bed rails  - bed rails  - bed rails;head of bed elevated  -SHARI    Bed Mobility, Scoot/Bridge, [place] minimum assist (75% patient effort)  - moderate assist (50% patient effort);2 person assist required  - conditional independence  -SHARI    Bed Mob, Supine to Sit, [place] minimum assist (75% patient effort);moderate assist (50% patient effort)  - minimum assist (75% patient effort)  - conditional independence  -SHARI    Bed Mobility, Safety Issues  decreased use of arms for pushing/pulling;decreased use of legs for bridging/pushing  - decreased use of legs for bridging/pushing  -SHARI     Bed Mobility, Impairments  other (see comments)  -JW strength decreased  -SHARI    Bed Mobility, Comment  Jerking movements,falling backward in sitting, not using UEs effectively for support  -JW     Recorded by [] Sea Piedra PTA [JW] Kelli Rodriguez PTA [SHARI] Sai Stock PTA    Transfer Assessment/Treatment    Transfers, Sit-Stand [place]  not tested  -JW minimum assist (75% patient effort);verbal cues required;nonverbal cues required (demo/gesture)  -SHARI    Transfers, Stand-Sit [place]   contact guard assist;verbal cues required  -SHARI    Transfers, Sit-Stand-Sit, Assist Device   rolling walker  -SHARI    Transfer, Impairments   strength decreased;impaired balance  -SHARI    Transfer, Comment not tested  -  Pt somewhat lethargic requiring increased assist initially, improved as session progressed.   -SHARI    Recorded by [] Sea Piedra PTA [JW] Kelli Rodriguez PTA [SHARI] Sai Stock PTA    Gait Assessment/Treatment    Gait, [place] Level not appropriate to assess  -RH not tested  - contact guard assist;verbal cues required  -SHARI    Gait, Assistive Device   rolling walker  -SHARI    Gait, Distance (Feet)   60  -SHARI    Gait, Gait Deviations   edil decreased;limb motion velocity decreased;step length decreased  -SHARI    Gait, Safety Issues   step length decreased;balance decreased during turns  -SHARI    Gait, Impairments   strength decreased;impaired balance  -SHARI    Gait, Comment   Pt reported increased (R)leg fatigue following gait training, HEP reviewed for improved strength and endurance.   -SHARI    Recorded by [] Sea Piedra PTA [JW] Kelli Rodriguez PTA [SHARI] Sai Stock PTA    Balance Skills Training    Sitting-Level of Assistance Close supervision  -      Sitting-Balance Support Right upper extremity supported;Left upper extremity supported;Feet supported   R foot  -      Sitting-Balance Activities Forward lean;Head control activities (Comment);Trunk control activities   -RH      Sitting # of Minutes 10  -RH      Recorded by [RH] Sea Piedra PTA      Therapy Exercises    Right Lower Extremity   AROM:;10 reps;supine;heel slides;hip abduction/adduction;ankle pumps/circles  -SHARI    Left Lower Extremity   AROM:;10 reps;hip flexion;hip extension;hip abduction/adduction  -SHARI    Recorded by   [SHARI] Sai Stock PTA    Positioning and Restraints    Pre-Treatment Position in bed  -RH in bed  -JW in bed  -SHARI    Post Treatment Position bed  -RH bed  -JW bed  -SHARI    In Bed supine;exit alarm on  -RH supine;call light within reach;encouraged to call for assist;exit alarm on;notified nsg  -JW supine;call light within reach;encouraged to call for assist;exit alarm on  -SHARI    Recorded by [RH] Sea Piedra PTA [] Kelli Rodriguez PTA [SHARI] Sai Stock PTA      User Key  (r) = Recorded By, (t) = Taken By, (c) = Cosigned By    Initials Name Effective Dates     Kelli Rodriguez, QUINTON 02/18/16 -     RH Sea Piedra PTA 02/18/16 -     SHARI Sai Stock PTA 04/24/15 -                 IP PT Goals       07/17/17 1310          Transfer Training PT LTG    Transfer Training PT LTG, Date Established 07/17/17  -KH (r) GERA (t) KH (c)      Transfer Training PT LTG, Time to Achieve 1 wk  -KH (r) GERA (t) KH (c)      Transfer Training PT LTG, Activity Type all transfers  -KH (r) GERA (t) KH (c)      Transfer Training PT LTG, [place] Level contact guard assist  -KH (r) GERA (t) KH (c)      Transfer Training PT LTG, Assist Device --   Appropriate AD  -KH (r) GERA (t) KH (c)      Gait Training PT LTG    Gait Training Goal PT LTG, Date Established 07/17/17  -KH (r) GERA (t) KH (c)      Gait Training Goal PT LTG, Time to Achieve 1 wk  -KH (r) GERA (t) KH (c)      Gait Training Goal PT LTG, [place] Level contact guard assist;2 person assist required  -KH (r) GERA (t) KH (c)      Gait Training Goal PT LTG, Assist Device --   Appropriate AD  -KH (r) GERA (t) KH (c)      Gait Training Goal PT LTG, Distance to  Achieve 20  -KH (r) GERA (t) KH (c)        User Key  (r) = Recorded By, (t) = Taken By, (c) = Cosigned By    Initials Name Provider Type    ARMANI Sifuentes, PT Physical Therapist    GERA Flowers, PT Student PT Student          Physical Therapy Education     Title: PT OT SLP Therapies (Active)     Topic: Physical Therapy (Active)     Point: Mobility training (Done)    Learning Progress Summary    Learner Readiness Method Response Comment Documented by Status   Patient Acceptance E VU,NR  SHARI 07/20/17 0958 Done    Acceptance E VU,NR   07/18/17 0935 Done    Acceptance E VU,NR   07/17/17 1310 Done               Point: Home exercise program (Done)    Learning Progress Summary    Learner Readiness Method Response Comment Documented by Status   Patient Acceptance E VU,NR  SHARI 07/20/17 0958 Done    Acceptance E VU,NR   07/18/17 0935 Done    Acceptance E VU,NR   07/17/17 1310 Done               Point: Body mechanics (Done)    Learning Progress Summary    Learner Readiness Method Response Comment Documented by Status   Patient Acceptance E VU,NR  SHARI 07/20/17 0958 Done    Acceptance E VU,NR   07/18/17 0935 Done    Acceptance E VU,NR   07/17/17 1310 Done               Point: Precautions (Active)    Learning Progress Summary    Learner Readiness Method Response Comment Documented by Status   Patient Acceptance E NR  JW 07/21/17 1127 Active    Acceptance E VU,NR   07/20/17 0958 Done    Acceptance E VU,NR   07/18/17 0935 Done    Acceptance E VU,NR   07/17/17 1310 Done                      User Key     Initials Effective Dates Name Provider Type [facility] 02/18/16 -  Kelli Rodriguez PTA Physical Therapy Assistant PT     04/24/15 -  Sai Stock PTA Physical Therapy Assistant PT     05/08/17 -  Reggie Flowers, PT Student PT Student PT                    PT Recommendation and Plan  Anticipated Equipment Needs At Discharge: walker  Anticipated Discharge Disposition: inpatient rehabilitation  facility, home with home health  Planned Therapy Interventions: balance training, gait training, home exercise program, patient/family education, strengthening, transfer training  PT Frequency: daily  Plan of Care Review  Plan Of Care Reviewed With: patient  Progress: improving          Outcome Measures       07/22/17 1300 07/21/17 1100 07/20/17 1000    How much help from another person do you currently need...    Turning from your back to your side while in flat bed without using bedrails? 3  -RH 4  -JW 4  -SHARI    Moving from lying on back to sitting on the side of a flat bed without bedrails? 2  -RH 2  -JW 4  -SHARI    Moving to and from a bed to a chair (including a wheelchair)? 1  -RH 2  -JW 3  -SHARI    Standing up from a chair using your arms (e.g., wheelchair, bedside chair)? 1  - 2  -JW 3  -SHARI    Climbing 3-5 steps with a railing? 1  - 1  -JW 1  -SHARI    To walk in hospital room? 1  - 2  -JW 3  -SHARI    AM-PAC 6 Clicks Score 9  - 13  -JW 18  -SHARI    Functional Assessment    Outcome Measure Options   AM-PAC 6 Clicks Basic Mobility (PT)  -SHARI      User Key  (r) = Recorded By, (t) = Taken By, (c) = Cosigned By    Initials Name Provider Type     Kelli Rodriguez, [facility] Physical Therapy Assistant     Sea Piedra PTA Physical Therapy Assistant    SHARI Stock, [facility] Physical Therapy Assistant           Time Calculation:         PT Charges       07/22/17 1326          Time Calculation    Start Time 1315  -      Stop Time 1330  -      Time Calculation (min) 15 min  -      PT Received On 07/22/17  -      PT - Next Appointment 07/23/17  -        User Key  (r) = Recorded By, (t) = Taken By, (c) = Cosigned By    Initials Name Provider Type     Sea Piedra, QUINTON Physical Therapy Assistant          Therapy Charges for Today     Code Description Service Date Service Provider Modifiers Qty    [id number] HC PT THER PROC EA 15 MIN 7/22/2017 Sea Piedra, QUINTON GP 1          PT G-Codes  Outcome  Measure Options: AM-PAC 6 Clicks Basic Mobility (PT)    Sea Piedra, PTA  7/22/2017

## 2017-07-23 LAB
ABO GROUP BLD: NORMAL
ALBUMIN SERPL-MCNC: 2.1 G/DL (ref 3.5–5.2)
ANION GAP SERPL CALCULATED.3IONS-SCNC: 14.6 MMOL/L
BACTERIA SPEC RESP CULT: ABNORMAL
BACTERIA SPEC RESP CULT: ABNORMAL
BLD GP AB SCN SERPL QL: NEGATIVE
BUN BLD-MCNC: 29 MG/DL (ref 6–20)
BUN/CREAT SERPL: 10.6 (ref 7–25)
CALCIUM SPEC-SCNC: 7.5 MG/DL (ref 8.6–10.5)
CHLORIDE SERPL-SCNC: 114 MMOL/L (ref 98–107)
CO2 SERPL-SCNC: 19.4 MMOL/L (ref 22–29)
CREAT BLD-MCNC: 2.74 MG/DL (ref 0.76–1.27)
DEPRECATED RDW RBC AUTO: 53.2 FL (ref 37–54)
ERYTHROCYTE [DISTWIDTH] IN BLOOD BY AUTOMATED COUNT: 15.6 % (ref 11.5–14.5)
GFR SERPL CREATININE-BSD FRML MDRD: 24 ML/MIN/1.73
GLUCOSE BLD-MCNC: 103 MG/DL (ref 65–99)
GLUCOSE BLDC GLUCOMTR-MCNC: 103 MG/DL (ref 70–130)
GLUCOSE BLDC GLUCOMTR-MCNC: 125 MG/DL (ref 70–130)
GLUCOSE BLDC GLUCOMTR-MCNC: 145 MG/DL (ref 70–130)
GLUCOSE BLDC GLUCOMTR-MCNC: 218 MG/DL (ref 70–130)
GRAM STN SPEC: ABNORMAL
HCT VFR BLD AUTO: 22.3 % (ref 40.4–52.2)
HGB BLD-MCNC: 7.5 G/DL (ref 13.7–17.6)
MAGNESIUM SERPL-MCNC: 1.4 MG/DL (ref 1.6–2.6)
MCH RBC QN AUTO: 31 PG (ref 27–32.7)
MCHC RBC AUTO-ENTMCNC: 33.6 G/DL (ref 32.6–36.4)
MCV RBC AUTO: 92.1 FL (ref 79.8–96.2)
PHOSPHATE SERPL-MCNC: 2 MG/DL (ref 2.5–4.5)
PLATELET # BLD AUTO: 146 10*3/MM3 (ref 140–500)
PMV BLD AUTO: 9.6 FL (ref 6–12)
POTASSIUM BLD-SCNC: 3.1 MMOL/L (ref 3.5–5.2)
RBC # BLD AUTO: 2.42 10*6/MM3 (ref 4.6–6)
RH BLD: NEGATIVE
SODIUM BLD-SCNC: 148 MMOL/L (ref 136–145)
VANCOMYCIN TROUGH SERPL-MCNC: 18.8 MCG/ML (ref 5–20)
WBC NRBC COR # BLD: 10.07 10*3/MM3 (ref 4.5–10.7)

## 2017-07-23 PROCEDURE — 80069 RENAL FUNCTION PANEL: CPT | Performed by: INTERNAL MEDICINE

## 2017-07-23 PROCEDURE — 82962 GLUCOSE BLOOD TEST: CPT

## 2017-07-23 PROCEDURE — 86900 BLOOD TYPING SEROLOGIC ABO: CPT | Performed by: SURGERY

## 2017-07-23 PROCEDURE — 86850 RBC ANTIBODY SCREEN: CPT | Performed by: SURGERY

## 2017-07-23 PROCEDURE — 86901 BLOOD TYPING SEROLOGIC RH(D): CPT | Performed by: SURGERY

## 2017-07-23 PROCEDURE — 25010000002 MAGNESIUM SULFATE IN D5W 1G/100ML (PREMIX) 1-5 GM/100ML-% SOLUTION: Performed by: INTERNAL MEDICINE

## 2017-07-23 PROCEDURE — 86923 COMPATIBILITY TEST ELECTRIC: CPT

## 2017-07-23 PROCEDURE — 80202 ASSAY OF VANCOMYCIN: CPT | Performed by: SURGERY

## 2017-07-23 PROCEDURE — 36430 TRANSFUSION BLD/BLD COMPNT: CPT

## 2017-07-23 PROCEDURE — 83735 ASSAY OF MAGNESIUM: CPT | Performed by: INTERNAL MEDICINE

## 2017-07-23 PROCEDURE — 85027 COMPLETE CBC AUTOMATED: CPT | Performed by: INTERNAL MEDICINE

## 2017-07-23 PROCEDURE — P9016 RBC LEUKOCYTES REDUCED: HCPCS

## 2017-07-23 PROCEDURE — 63710000001 INSULIN ASPART PER 5 UNITS: Performed by: SURGERY

## 2017-07-23 PROCEDURE — 84132 ASSAY OF SERUM POTASSIUM: CPT | Performed by: INTERNAL MEDICINE

## 2017-07-23 PROCEDURE — 97110 THERAPEUTIC EXERCISES: CPT

## 2017-07-23 PROCEDURE — 25010000002 CEFEPIME: Performed by: INTERNAL MEDICINE

## 2017-07-23 PROCEDURE — 86900 BLOOD TYPING SEROLOGIC ABO: CPT

## 2017-07-23 RX ORDER — MINOXIDIL 2.5 MG/1
2.5 TABLET ORAL ONCE
Status: COMPLETED | OUTPATIENT
Start: 2017-07-23 | End: 2017-07-23

## 2017-07-23 RX ORDER — MINOXIDIL 2.5 MG/1
5 TABLET ORAL 2 TIMES DAILY
Status: DISCONTINUED | OUTPATIENT
Start: 2017-07-23 | End: 2017-07-25

## 2017-07-23 RX ORDER — MINOXIDIL 2.5 MG/1
2.5 TABLET ORAL 2 TIMES DAILY
Status: DISCONTINUED | OUTPATIENT
Start: 2017-07-23 | End: 2017-07-23

## 2017-07-23 RX ORDER — AMLODIPINE BESYLATE 5 MG/1
5 TABLET ORAL 2 TIMES DAILY
Status: DISCONTINUED | OUTPATIENT
Start: 2017-07-23 | End: 2017-07-25 | Stop reason: HOSPADM

## 2017-07-23 RX ORDER — POTASSIUM CHLORIDE 750 MG/1
40 CAPSULE, EXTENDED RELEASE ORAL EVERY 6 HOURS
Status: COMPLETED | OUTPATIENT
Start: 2017-07-23 | End: 2017-07-23

## 2017-07-23 RX ORDER — MAGNESIUM SULFATE 1 G/100ML
2 INJECTION INTRAVENOUS ONCE
Status: DISCONTINUED | OUTPATIENT
Start: 2017-07-23 | End: 2017-07-23 | Stop reason: SDUPTHER

## 2017-07-23 RX ORDER — MAGNESIUM SULFATE 1 G/100ML
1 INJECTION INTRAVENOUS
Status: COMPLETED | OUTPATIENT
Start: 2017-07-23 | End: 2017-07-23

## 2017-07-23 RX ADMIN — AMLODIPINE BESYLATE 5 MG: 5 TABLET ORAL at 08:37

## 2017-07-23 RX ADMIN — INSULIN ASPART 4 UNITS: 100 INJECTION, SOLUTION INTRAVENOUS; SUBCUTANEOUS at 18:09

## 2017-07-23 RX ADMIN — CARVEDILOL 3.12 MG: 3.12 TABLET, FILM COATED ORAL at 08:37

## 2017-07-23 RX ADMIN — SERTRALINE 100 MG: 100 TABLET, FILM COATED ORAL at 08:38

## 2017-07-23 RX ADMIN — MAGNESIUM SULFATE HEPTAHYDRATE 1 G: 1 INJECTION, SOLUTION INTRAVENOUS at 08:57

## 2017-07-23 RX ADMIN — MINOXIDIL 5 MG: 2.5 TABLET ORAL at 18:09

## 2017-07-23 RX ADMIN — ASPIRIN 81 MG: 81 TABLET ORAL at 08:38

## 2017-07-23 RX ADMIN — CEFEPIME 2 G: 2 INJECTION, POWDER, FOR SOLUTION INTRAVENOUS at 13:24

## 2017-07-23 RX ADMIN — TAMSULOSIN HYDROCHLORIDE 0.8 MG: 0.4 CAPSULE ORAL at 08:37

## 2017-07-23 RX ADMIN — MAGNESIUM SULFATE HEPTAHYDRATE 1 G: 1 INJECTION, SOLUTION INTRAVENOUS at 11:14

## 2017-07-23 RX ADMIN — AMLODIPINE BESYLATE 5 MG: 5 TABLET ORAL at 18:08

## 2017-07-23 RX ADMIN — MINOXIDIL 5 MG: 2.5 TABLET ORAL at 08:36

## 2017-07-23 RX ADMIN — POTASSIUM CHLORIDE 40 MEQ: 750 CAPSULE, EXTENDED RELEASE ORAL at 16:04

## 2017-07-23 RX ADMIN — SODIUM BICARBONATE 1300 MG: 650 TABLET ORAL at 08:36

## 2017-07-23 RX ADMIN — INSULIN DETEMIR 18 UNITS: 100 INJECTION, SOLUTION SUBCUTANEOUS at 21:18

## 2017-07-23 RX ADMIN — ATORVASTATIN CALCIUM 40 MG: 20 TABLET, FILM COATED ORAL at 08:38

## 2017-07-23 RX ADMIN — MINOXIDIL 2.5 MG: 2.5 TABLET ORAL at 00:58

## 2017-07-23 RX ADMIN — VITAMIN D, TAB 1000IU (100/BT) 5000 UNITS: 25 TAB at 08:37

## 2017-07-23 RX ADMIN — APIXABAN 5 MG: 5 TABLET, FILM COATED ORAL at 10:30

## 2017-07-23 RX ADMIN — CARVEDILOL 3.12 MG: 3.12 TABLET, FILM COATED ORAL at 21:17

## 2017-07-23 RX ADMIN — APIXABAN 5 MG: 5 TABLET, FILM COATED ORAL at 21:17

## 2017-07-23 RX ADMIN — SODIUM BICARBONATE 1300 MG: 650 TABLET ORAL at 21:17

## 2017-07-23 RX ADMIN — NYSTATIN: 100000 POWDER TOPICAL at 10:34

## 2017-07-23 RX ADMIN — NICOTINE 1 PATCH: 14 PATCH, EXTENDED RELEASE TRANSDERMAL at 21:20

## 2017-07-23 RX ADMIN — FAMOTIDINE 20 MG: 20 TABLET, FILM COATED ORAL at 10:29

## 2017-07-23 RX ADMIN — NYSTATIN: 100000 POWDER TOPICAL at 21:18

## 2017-07-23 RX ADMIN — VANCOMYCIN HYDROCHLORIDE 750 MG: 750 INJECTION, POWDER, LYOPHILIZED, FOR SOLUTION INTRAVENOUS at 14:38

## 2017-07-23 RX ADMIN — SODIUM BICARBONATE 1300 MG: 650 TABLET ORAL at 16:04

## 2017-07-23 RX ADMIN — POTASSIUM CHLORIDE 40 MEQ: 750 CAPSULE, EXTENDED RELEASE ORAL at 08:57

## 2017-07-23 NOTE — NURSING NOTE
Run 1st and 2nd bag of magnesium each over 2 hours.  Give 80MEQ total of potassium today.  Recheck magnesium level after blood transfusion per Dr. Mcneill.

## 2017-07-23 NOTE — PROGRESS NOTES
[facility] INPATIENT PROGRESS NOTE         [address]    7/23/2017      PATIENT IDENTIFICATION:   Name:  Jay Blackwood      MRN:  [id number]     57 y.o.  male             LOS 9    Reason for visit: f/u PNA      SUBJECTIVE:    No cp, productive cough.  Less soa. Wife at the bed side     Objective   OBJECTIVE:    Vital Sign Min/Max for last 24 hours  Temp  Min: 97.4 °F (36.3 °C)  Max: 98.2 °F (36.8 °C)   BP  Min: 122/74  Max: 201/81   Pulse  Min: 53  Max: 72   Resp  Min: 16  Max: 18   SpO2  Min: 93 %  Max: 98 %   No Data Recorded   No Data Recorded           Body mass index is 27.46 kg/(m^2).    Intake/Output Summary (Last 24 hours) at 07/23/17 1433  Last data filed at 07/23/17 1304   Gross per 24 hour   Intake             1293 ml   Output             2100 ml   Net             -807 ml     \  Exam:  GEN:  No distress, appears stated age  EYES:   PERRL, anicteric sclera  ENT:    External ears/nose normal, OP clear  NECK:  No adenopathy, midline trachea  LUNGS: Normal chest on inspection, palpation and scattered course BS at bases on auscultation  CV:  Normal S1S2, without murmur  ABD:  Non tender, non distended, no hepatosplenomegaly, +BS  EXT:  No edema, cyanosis or clubbing    Scheduled meds:      amLODIPine 5 mg Oral BID   apixaban 5 mg Oral Q12H   aspirin 81 mg Oral Daily   atorvastatin 40 mg Oral Daily   carvedilol 3.125 mg Oral Q12H   cefepime 2 g Intravenous Q24H   cholecalciferol 5,000 Units Oral Daily   famotidine 20 mg Intravenous Daily   Or      famotidine 20 mg Oral Daily   insulin aspart 0-9 Units Subcutaneous 4x Daily With Meals & Nightly   insulin detemir 18 Units Subcutaneous Nightly   magnesium sulfate 1 g Intravenous Q1H   minoxidil 5 mg Oral BID   nicotine 1 patch Transdermal Q24H   nystatin  Topical Q12H   potassium chloride 40 mEq Oral Q6H   sertraline 100 mg Oral Daily   sodium bicarbonate 1,300 mg Oral TID   tamsulosin 0.8 mg Oral Daily   vancomycin 750 mg Intravenous Q24H      IV meds:                          Pharmacy to dose vancomycin      Data Review:    Results from last 7 days  Lab Units 07/23/17  0527 07/22/17  0516 07/21/17  0620 07/20/17  0542 07/19/17  0603   SODIUM mmol/L 148* 147* 143 140 140   POTASSIUM mmol/L 3.1* 3.2* 3.7 4.1 4.3   CHLORIDE mmol/L 114* 115* 112* 108* 110*   CO2 mmol/L 19.4* 19.1* 17.1* 17.1* 13.9*   BUN mg/dL 29* 34* 37* 42* 41*   CREATININE mg/dL 2.74* 3.28* 3.46* 3.77* 4.28*   GLUCOSE mg/dL 103* 82 166* 165* 113*   CALCIUM mg/dL 7.5* 8.2* 7.8* 7.9* 7.6*         Estimated Creatinine Clearance: 40.8 mL/min (by C-G formula based on Cr of 2.74).    Results from last 7 days  Lab Units 07/23/17  0527 07/21/17  0620 07/20/17  0542 07/19/17  0603 07/18/17  2350   WBC 10*3/mm3 10.07 10.74* 12.16* 12.33* 12.02*   HEMOGLOBIN g/dL 7.5* 8.1* 8.3* 9.2* 9.4*   PLATELETS 10*3/mm3 146 154 141 150 147       Results from last 7 days  Lab Units 07/18/17  2350   INR  1.18*       Results from last 7 days  Lab Units 07/19/17  0603   ALT (SGPT) U/L 6   AST (SGOT) U/L 16       Results from last 7 days  Lab Units 07/21/17  1248 07/19/17  0705 07/19/17  0050   PH, ARTERIAL pH units 7.320* 7.240* 7.318*   PO2 ART mm Hg 75.5* 65.8* 48.3*   PCO2, ARTERIAL mm Hg 33.1* 34.6* 29.9*   HCO3 ART mmol/L 17.0* 14.8* 15.3*       Assessment   ASSESSMENT:   Bilateral lower lobe pneumonia/pneumonitis with signs of potential aspiration component  Acute hypoxemic respiratory failure secondary to pneumonia  NORY: Previously intolerant positive airway pressure  Metabolic acidosis with anion gap  Left stump osteomyelitis with abscess status post revision and left above-knee amputation 7/14  History MRSA  Diabetes mellitus type 2  Acute on chronic kidney disease  Tobacco abuse: Suspect component of COPD  Dysphagia: on dysphagia diet       PLAN:  Abx per ID  Continue clearance measures.  Encourage quit smoking.      David Mercer MD  Pulmonary and Critical Care Medicine  [facility]  [facility], [facility]  7/23/2017

## 2017-07-23 NOTE — PLAN OF CARE
Problem: Patient Care Overview (Adult)  Goal: Plan of Care Review  Outcome: Ongoing (interventions implemented as appropriate)    07/23/17 1533   Coping/Psychosocial Response Interventions   Plan Of Care Reviewed With patient   Patient Care Overview   Progress progress towards functional goals is fair   Outcome Evaluation   Outcome Summary/Follow up Plan VSS, HGB 7.5 TODAY, 2UPRBCS INFUSING, MAG & POTASSIUM REPLACED. wILL CON'T TO MONITOR.       Goal: Adult Individualization and Mutuality  Outcome: Ongoing (interventions implemented as appropriate)  Goal: Discharge Needs Assessment  Outcome: Ongoing (interventions implemented as appropriate)    Problem: Fall Risk (Adult)  Goal: Absence of Falls  Outcome: Ongoing (interventions implemented as appropriate)    Problem: Pain, Acute (Adult)  Goal: Acceptable Pain Control/Comfort Level  Outcome: Ongoing (interventions implemented as appropriate)    Problem: Infection, Risk/Actual (Adult)  Goal: Infection Prevention/Resolution  Outcome: Ongoing (interventions implemented as appropriate)

## 2017-07-23 NOTE — PROGRESS NOTES
Name: Jay Blackwood ADMIT: 2017   : 1960  PCP: JOELLE Pak    MRN: [id number] LOS: 9 days   AGE/SEX: 57 y.o. male  ROOM: [facility]     Subjective   Subjective  Says he's doing okay.  Felt poorly earlier today.    Objective   Vital Signs  Temp:  [97.4 °F (36.3 °C)-98.2 °F (36.8 °C)] 98 °F (36.7 °C)  Heart Rate:  [53-72] 56  Resp:  [18] 18  BP: (163-201)/(58-86) 190/68  SpO2:  [95 %-96 %] 95 %  on   ;   O2 Device: room air  Body mass index is 27.46 kg/(m^2).    Physical Exam   Constitutional: He is oriented to person, place, and time. No distress.   Cardiovascular: Normal rate and regular rhythm.    No murmur heard.  Pulmonary/Chest: Effort normal and breath sounds normal.   Abdominal: Soft. Bowel sounds are normal. He exhibits no distension. There is no tenderness.   Musculoskeletal: Normal range of motion. He exhibits no edema.   Left AKA   Neurological: He is alert and oriented to person, place, and time.   Skin: Skin is warm and dry. He is not diaphoretic.       Results Review:       I reviewed the patient's new clinical results.    Results from last 7 days  Lab Units 17  0527 17  0620 17  0542 17  0603 17  2350 17  0452 17  0458   WBC 10*3/mm3 10.07 10.74* 12.16* 12.33* 12.02* 7.75 8.85   HEMOGLOBIN g/dL 7.5* 8.1* 8.3* 9.2* 9.4* 7.7* 8.2*   PLATELETS 10*3/mm3 146 154 141 150 147 139* 133*       Results from last 7 days  Lab Units 17  0527 17  0516 17  0620 17  0542 17  0603 17  0452 17  0458   SODIUM mmol/L 148* 147* 143 140 140 137 137   POTASSIUM mmol/L 3.1* 3.2* 3.7 4.1 4.3 4.1 4.4   CHLORIDE mmol/L 114* 115* 112* 108* 110* 107 111*   CO2 mmol/L 19.4* 19.1* 17.1* 17.1* 13.9* 14.1* 13.3*   BUN mg/dL 29* 34* 37* 42* 41* 42* 41*   CREATININE mg/dL 2.74* 3.28* 3.46* 3.77* 4.28* 4.23* 4.14*   GLUCOSE mg/dL 103* 82 166* 165* 113* 83 95   Estimated Creatinine Clearance: 40.8 mL/min (by C-G formula based on  Cr of 2.74).    Results from last 7 days  Lab Units 07/23/17  0527 07/22/17  0516 07/21/17  0620 07/20/17  0542 07/19/17  0603 07/18/17  0452 07/17/17  0458 07/16/17  2333   CALCIUM mg/dL 7.5* 8.2* 7.8* 7.9* 7.6* 7.7* 7.7*  --    ALBUMIN g/dL 2.10* 2.10* 2.10*  --  2.20* 2.50* 2.50*  --    MAGNESIUM mg/dL 1.4*  --  1.7  --   --  1.9 1.7 1.6   PHOSPHORUS mg/dL 2.0* 3.1 4.8*  --   --  5.4* 4.9*  --        Lab Results   Component Value Date    HGBA1C 6.38 (H) 07/22/2017    HGBA1C 7.89 (H) 10/26/2016     Glucose   Date/Time Value Ref Range Status   07/23/2017 0706 103 70 - 130 mg/dL Final   07/22/2017 2030 191 (H) 70 - 130 mg/dL Final   07/22/2017 1657 161 (H) 70 - 130 mg/dL Final   07/22/2017 1111 114 70 - 130 mg/dL Final   07/22/2017 0724 86 70 - 130 mg/dL Final   07/21/2017 2050 184 (H) 70 - 130 mg/dL Final   07/21/2017 1640 132 (H) 70 - 130 mg/dL Final   07/21/2017 1239 208 (H) 70 - 130 mg/dL Final         amLODIPine 5 mg Oral Q24H   apixaban 5 mg Oral Q12H   aspirin 81 mg Oral Daily   atorvastatin 40 mg Oral Daily   carvedilol 3.125 mg Oral Q12H   cefepime 2 g Intravenous Q24H   cholecalciferol 5,000 Units Oral Daily   famotidine 20 mg Intravenous Daily   Or      famotidine 20 mg Oral Daily   insulin aspart 0-9 Units Subcutaneous 4x Daily With Meals & Nightly   insulin detemir 18 Units Subcutaneous Nightly   magnesium sulfate 1 g Intravenous Q1H   minoxidil 5 mg Oral BID   nicotine 1 patch Transdermal Q24H   nystatin  Topical Q12H   potassium chloride 40 mEq Oral Q6H   sertraline 100 mg Oral Daily   sodium bicarbonate 1,300 mg Oral TID   tamsulosin 0.8 mg Oral Daily   vancomycin 750 mg Intravenous Q24H       dextrose 5 % with KCl 20 mEq 75 mL/hr Last Rate: 50 mL/hr (07/22/17 1359)   Pharmacy to dose vancomycin           Assessment/Plan   Assessment:     Active Hospital Problems (** Indicates Principal Problem)    Diagnosis Date Noted   • **Chronic osteomyelitis of left femur [M86.652] 07/14/2017   • Diabetes  mellitus type 2, insulin dependent [E11.9, Z79.4] 07/21/2017   • Chronic osteomyelitis [M86.60] 07/14/2017   • A-fib [I48.91] 10/26/2016   • PVD (peripheral vascular disease) [I73.9] 10/26/2016      Resolved Hospital Problems    Diagnosis Date Noted Date Resolved   No resolved problems to display.       Plan:   - POD#9 REVISION LT AMPUTATION ABOVE KNEE  - Continue LEVEMIR 18 units SC nightly.  - BS overall stable.  Morning BS better w/ snack last pm.  Remains on dextrose containing IVFs per renal.    - HgbA1C at goal.  NCS diet        Carlin Barry MD  [facility]  07/23/17  9:23 AM

## 2017-07-23 NOTE — NURSING NOTE
"Called Dr. Hurt to let him know the status of a patient blood pressures, 197/71 Hr 57.  Patient had a scheduled Metoprolol 5 mg IV to be given at the time, I didn't give medication due to patients low HR.  Patient has had several doses of Hydralizine 50 mg @ 1947 and @ 2158 per Dr. Hurt orders.  Dr. Hurt stated \"that's not an emergency\" and declined to give any orders and continued to berate me as I tried to get something to help lower this patients blood pressures.  I called Nephrology to see if I could get something to help this patient.  Spoke with Dr. Kelly, new orders were given.  Minoxidil 2.5 to given now and in 4 hours if patient systolic is greater than 180.  Discontinued Metoprolol and Hydralizine per Dr. Kelly.   "

## 2017-07-23 NOTE — THERAPY TREATMENT NOTE
Acute Care - Physical Therapy Treatment Note  [facility]     Patient Name: Jay Blackwood  : 1960  MRN: [id number]  Today's Date: 2017  Onset of Illness/Injury or Date of Surgery Date: 17     Referring Physician: Christoph    Admreg Date: 2017    Visit Dx:    ICD-10-CM ICD-9-CM   1. Decreased mobility R26.89 781.99   2. Chronic osteomyelitis M86.60 730.10     Patient Active Problem List   Diagnosis   • OA (osteoarthritis) of knee   • Diabetes mellitus   • Hypertension   • A-fib   • Neuropathic pain   • PVD (peripheral vascular disease)   • HLD (hyperlipidemia)   • Chronic osteomyelitis of left femur   • Chronic osteomyelitis   • Diabetes mellitus type 2, insulin dependent               Adult Rehabilitation Note       17 1338 17 1300 17 1119    Rehab Assessment/Intervention    Discipline physical therapist  -AR physical therapy assistant  -RH physical therapy assistant  -    Document Type therapy note (daily note)  -AR therapy note (daily note)  - therapy note (daily note)  -    Subjective Information agree to therapy  -AR agree to therapy;weakness;fatigue;pain  - agree to therapy   confused  -JW    Patient Effort, Rehab Treatment good  -AR      Symptoms Noted During/After Treatment  none  -RH other (see comments)   unble to focus on task  -JW    Precautions/Limitations fall precautions  -AR fall precautions  -RH fall precautions  -JW    Precautions/Limitations, Vision  WFL  -RH     Precautions/Limitations, Hearing  WFL  -     Specific Treatment Considerations  L AKA revision  -RH L AKA Revision  -JW    Recorded by [AR] Mariangel Osei, PT [RH] Sea Piedra, PTA [JW] Kelli Rodriguez PTA    Vital Signs    Pre SpO2 (%)  95  -RH 95  -JW    O2 Delivery Pre Treatment  room air  -RH supplemental O2  -JW    Recorded by  [RH] Sea Piedra PTA [JW] Kelli Rodriguez PTA    Pain Assessment    Pain Assessment 0-10  -AR 0-10  -RH No/denies pain  -JW    Pain  Score 5  -AR 6  -RH     Pain Location  --   neck/shoulder/knee/R resudial limb  -RH Incision  -JW    Pain Orientation   Left  -JW    Recorded by [AR] Mariangel Osei PT [] Sea Piedra, PTA [] Kelli Rodriguez PTA    Cognitive Assessment/Intervention    Current Cognitive/Communication Assessment functional  -AR functional  -RH impaired  -    Orientation Status   oriented to;person;required verbal cueing (specifiy in comments)  -    Follows Commands/Answers Questions  100% of the time;needs cueing  - 50% of the time  -    Personal Safety  mild impairment  -RH decreased insight to deficits  -    Personal Safety Interventions  fall prevention program maintained  - fall prevention program maintained  -    Recorded by [AR] Mariangel Osei PT [] Sea Piedra, PTA [] Kelli Rodriguez PTA    Bed Mobility, Assessment/Treatment    Bed Mobility, Assistive Device bed rails;head of bed elevated  -AR bed rails  - bed rails  -    Bed Mobility, Scoot/Bridge, [place] minimum assist (75% patient effort)  -AR minimum assist (75% patient effort)  - moderate assist (50% patient effort);2 person assist required  -    Bed Mob, Supine to Sit, Karn[place] not tested  -AR minimum assist (75% patient effort);moderate assist (50% patient effort)  - minimum assist (75% patient effort)  -    Bed Mobility, Safety Issues   decreased use of arms for pushing/pulling;decreased use of legs for bridging/pushing  -    Bed Mobility, Impairments   other (see comments)  -    Bed Mobility, Comment   Jerking movements,falling backward in sitting, not using UEs effectively for support  -JW    Recorded by [AR] Mariangel Osei, PT [RH] Sea Piedra, PTA [] Kelli Rodriguez PTA    Transfer Assessment/Treatment    Transfers, Sit-Stand Karnes moderate assist (50% patient effort)  -AR  not tested  -    Transfers, Stand-Sit Karnes minimum assist (75% patient effort)  -AR       Transfers, Sit-Stand-Sit, Assist Device rolling walker  -AR      Transfer, Comment  not tested  -RH     Recorded by [AR] Mariangel Osei, PT [RH] Sea Piedra, QUINTON [] Kelli Rodriguez PTA    Gait Assessment/Treatment    Gait, Gil[place] Level  not appropriate to assess  -RH not tested  -    Gait, Comment 1 pivot towards HOB  -AR      Recorded by [AR] Mariangel Osei, PT [RH] Sea Piedra, QUINTON [] Kelli Rodriguez PTA    Balance Skills Training    Sitting-Level of Assistance  Close supervision  -RH     Sitting-Balance Support  Right upper extremity supported;Left upper extremity supported;Feet supported   R foot  -RH     Sitting-Balance Activities  Forward lean;Head control activities (Comment);Trunk control activities  -RH     Sitting # of Minutes  10  -RH     Recorded by  [RH] Sea Piedra PTA     Positioning and Restraints    Pre-Treatment Position in bed  -AR in bed  -RH in bed  -JW    Post Treatment Position bed  -AR bed  -RH bed  -JW    In Bed sitting EOB;call light within reach;encouraged to call for assist  -AR supine;exit alarm on  -RH supine;call light within reach;encouraged to call for assist;exit alarm on;notified nsg  -JW    Recorded by [AR] Mariangel Osei, PT [RH] Sea Piedra, QUINTON [] Kelli Rodriguez PTA      User Key  (r) = Recorded By, (t) = Taken By, (c) = Cosigned By    Initials Name Effective Dates     Kelil Rodriguez PTA 02/18/16 -     AR Mariangel Osei, PT 06/27/16 -      Sea Piedra PTA 02/18/16 -                 IP PT Goals       07/17/17 1310          Transfer Training PT LTG    Transfer Training PT LTG, Date Established 07/17/17  -KH (r) GERA (t) KH (c)      Transfer Training PT LTG, Time to Achieve 1 wk  -KH (r) GERA (t) KH (c)      Transfer Training PT LTG, Activity Type all transfers  -KH (r) GERA (t) KH (c)      Transfer Training PT LTG, Gilmer Level contact guard assist  -KH (r) GERA (t) KH (c)      Transfer Training PT LTG, Assist  Device --   Appropriate AD  -KH (r) GERA (t) KH (c)      Gait Training PT LTG    Gait Training Goal PT LTG, Date Established 07/17/17  -KH (r) GERA (t) KH (c)      Gait Training Goal PT LTG, Time to Achieve 1 wk  -KH (r) GERA (t) KH (c)      Gait Training Goal PT LTG, [place] Level contact guard assist;2 person assist required  -KH (r) GERA (t) KH (c)      Gait Training Goal PT LTG, Assist Device --   Appropriate AD  -KH (r) GERA (t) KH (c)      Gait Training Goal PT LTG, Distance to Achieve 20  -KH (r) GERA (t) KH (c)        User Key  (r) = Recorded By, (t) = Taken By, (c) = Cosigned By    Initials Name Provider Type    ARMANI Sifuentes, PT Physical Therapist    GERA Flowers, PT Student PT Student          Physical Therapy Education     Title: PT OT SLP Therapies (Done)     Topic: Physical Therapy (Done)     Point: Mobility training (Done)    Learning Progress Summary    Learner Readiness Method Response Comment Documented by Status   Patient Acceptance E VU  AR 07/23/17 1340 Done    Acceptance E VU,NR   07/20/17 0958 Done    Acceptance E VU,NR   07/18/17 0935 Done    Acceptance E VU,NR   07/17/17 1310 Done               Point: Home exercise program (Done)    Learning Progress Summary    Learner Readiness Method Response Comment Documented by Status   Patient Acceptance E VU  AR 07/23/17 1340 Done    Acceptance E VU,NR   07/20/17 0958 Done    Acceptance E VU,NR   07/18/17 0935 Done    Acceptance E VU,NR   07/17/17 1310 Done               Point: Body mechanics (Done)    Learning Progress Summary    Learner Readiness Method Response Comment Documented by Status   Patient Acceptance E VU  AR 07/23/17 1340 Done    Acceptance E VU,NR   07/20/17 0958 Done    Acceptance E VU,NR   07/18/17 0935 Done    Acceptance E VU,NR   07/17/17 1310 Done               Point: Precautions (Done)    Learning Progress Summary    Learner Readiness Method Response Comment Documented by Status   Patient Acceptance E VU   AR 07/23/17 1340 Done    Acceptance E NR  JW 07/21/17 1127 Active    Acceptance E VU,NR  SHARI 07/20/17 0958 Done    Acceptance E VU,NR  SHARI 07/18/17 0935 Done    Acceptance E VU,NR  GERA 07/17/17 1310 Done                      User Key     Initials Effective Dates Name Provider Type Discipline    JW 02/18/16 -  Kelli Rodriguez, QUINTON Physical Therapy Assistant PT    AR 06/27/16 -  Mariangel Osei, PT Physical Therapist PT    SHARI 04/24/15 -  Sai Stock PTA Physical Therapy Assistant PT    GERA 05/08/17 -  Reggie Flowers, PT Student PT Student PT                    PT Recommendation and Plan  Anticipated Equipment Needs At Discharge: walker  Anticipated Discharge Disposition: inpatient rehabilitation facility, home with home health  Planned Therapy Interventions: balance training, gait training, home exercise program, patient/family education, strengthening, transfer training  PT Frequency: daily  Plan of Care Review  Plan Of Care Reviewed With: patient  Outcome Summary/Follow up Plan: Progressing towards goals during physical therapy, able to stand at EOB w/ assist and use of walker.  Limited by fatigue.            Outcome Measures       07/23/17 1300 07/22/17 1300 07/21/17 1100    How much help from another person do you currently need...    Turning from your back to your side while in flat bed without using bedrails? 4  -AR 3  -RH 4  -JW    Moving from lying on back to sitting on the side of a flat bed without bedrails? 3  -AR 2  -RH 2  -JW    Moving to and from a bed to a chair (including a wheelchair)? 2  -AR 1  -RH 2  -JW    Standing up from a chair using your arms (e.g., wheelchair, bedside chair)? 2  -AR 1  -RH 2  -JW    Climbing 3-5 steps with a railing? 1  -AR 1  -RH 1  -JW    To walk in hospital room? 1  -AR 1  -RH 2  -JW    AM-PAC 6 Clicks Score 13  -AR 9  -RH 13  -JW      User Key  (r) = Recorded By, (t) = Taken By, (c) = Cosigned By    Initials Name Provider Type     Kelli Rodriguez PTA Physical Therapy  Assistant    AR Mariangel Osei, PT Physical Therapist    RH Sea Piedra, PTA Physical Therapy Assistant           Time Calculation:         PT Charges       07/23/17 1341          Time Calculation    Start Time 1326  -AR      Stop Time 1341  -AR      Time Calculation (min) 15 min  -AR      PT Received On 07/23/17  -AR      PT - Next Appointment 07/24/17  -AR        User Key  (r) = Recorded By, (t) = Taken By, (c) = Cosigned By    Initials Name Provider Type    AR Mariangel Osei PT Physical Therapist          Therapy Charges for Today     Code Description Service Date Service Provider Modifiers Qty    17982915044 HC PT THER PROC EA 15 MIN 7/23/2017 Mariangel Osei, PT GP 1          PT G-Codes  Outcome Measure Options: AM-PAC 6 Clicks Basic Mobility (PT)    Mariangel Osei PT  7/23/2017

## 2017-07-23 NOTE — PROGRESS NOTES
"   LOS: 9 days    Patient Care Team:  JOELLE Pak as PCP - General (Family Medicine)  Prosper Oh MD as Consulting Physician (Cardiology)    Chief Complaint:  No chief complaint on file.      Subjective   F/u KRISTA     Interval History:   Discussed with nurse.  Pt is feeling better. Very thirsty. Sitting in bed.  No distress.    Neck and shoulder pain. denies soa, cp, n/v.    Objective     Vital Signs  Temp:  [97.4 °F (36.3 °C)-98.2 °F (36.8 °C)] 98 °F (36.7 °C)  Heart Rate:  [53-72] 59  Resp:  [18] 18  BP: (163-201)/(58-86) 185/69    Flowsheet Rows         First Filed Value    Admission Height  74\" (188 cm) Documented at 07/14/2017 1002    Admission Weight  220 lb (99.8 kg) Documented at 07/14/2017 1002             I/O last 3 completed shifts:  In: 1393 [P.O.:360; I.V.:933; IV Piggyback:100]  Out: 4625 [Urine:4625]    Intake/Output Summary (Last 24 hours) at 07/23/17 1100  Last data filed at 07/23/17 0602   Gross per 24 hour   Intake             1053 ml   Output             3050 ml   Net            -1997 ml       Physical Exam:  gen sitting upright, No distress.  Neck supple no jvd  Lungs CTA bilat no rales  CV RRR  abd soft nt/nd  +segal  vasc LLE AKA, trace RLE edema      Results Review:      Results from last 7 days  Lab Units 07/23/17  0527 07/22/17  0516 07/21/17  0620  07/19/17  0603   SODIUM mmol/L 148* 147* 143  < > 140   POTASSIUM mmol/L 3.1* 3.2* 3.7  < > 4.3   CHLORIDE mmol/L 114* 115* 112*  < > 110*   CO2 mmol/L 19.4* 19.1* 17.1*  < > 13.9*   BUN mg/dL 29* 34* 37*  < > 41*   CREATININE mg/dL 2.74* 3.28* 3.46*  < > 4.28*   CALCIUM mg/dL 7.5* 8.2* 7.8*  < > 7.6*   BILIRUBIN mg/dL  --   --   --   --  0.2   ALK PHOS U/L  --   --   --   --  77   ALT (SGPT) U/L  --   --   --   --  6   AST (SGOT) U/L  --   --   --   --  16   GLUCOSE mg/dL 103* 82 166*  < > 113*   < > = values in this interval not displayed.    Estimated Creatinine Clearance: 40.8 mL/min (by C-G formula based on Cr of " 2.74).      Results from last 7 days  Lab Units 07/23/17  0527 07/22/17  0516 07/21/17  0620 07/18/17  0452   MAGNESIUM mg/dL 1.4*  --  1.7 1.9   PHOSPHORUS mg/dL 2.0* 3.1 4.8* 5.4*         Results from last 7 days  Lab Units 07/21/17  0620 07/18/17  0452   URIC ACID mg/dL 9.2* 9.4*         Results from last 7 days  Lab Units 07/23/17  0527 07/21/17  0620 07/20/17  0542 07/19/17  0603 07/18/17  2350   WBC 10*3/mm3 10.07 10.74* 12.16* 12.33* 12.02*   HEMOGLOBIN g/dL 7.5* 8.1* 8.3* 9.2* 9.4*   PLATELETS 10*3/mm3 146 154 141 150 147         Results from last 7 days  Lab Units 07/18/17  2350   INR  1.18*         Imaging Results (last 24 hours)     ** No results found for the last 24 hours. **          amLODIPine 5 mg Oral Q24H   apixaban 5 mg Oral Q12H   aspirin 81 mg Oral Daily   atorvastatin 40 mg Oral Daily   carvedilol 3.125 mg Oral Q12H   cefepime 2 g Intravenous Q24H   cholecalciferol 5,000 Units Oral Daily   famotidine 20 mg Intravenous Daily   Or      famotidine 20 mg Oral Daily   insulin aspart 0-9 Units Subcutaneous 4x Daily With Meals & Nightly   insulin detemir 18 Units Subcutaneous Nightly   magnesium sulfate 1 g Intravenous Q1H   minoxidil 5 mg Oral BID   nicotine 1 patch Transdermal Q24H   nystatin  Topical Q12H   potassium chloride 40 mEq Oral Q6H   sertraline 100 mg Oral Daily   sodium bicarbonate 1,300 mg Oral TID   tamsulosin 0.8 mg Oral Daily   vancomycin 750 mg Intravenous Q24H       dextrose 5 % with KCl 20 mEq 75 mL/hr Last Rate: 50 mL/hr (07/22/17 1359)   Pharmacy to dose vancomycin         Medication Review:   Current Facility-Administered Medications   Medication Dose Route Frequency Provider Last Rate Last Dose   • amLODIPine (NORVASC) tablet 5 mg  5 mg Oral Q24H Romeo Agee MD   5 mg at 07/23/17 0837   • apixaban (ELIQUIS) tablet 5 mg  5 mg Oral Q12H Mark Hawthorne MD   5 mg at 07/23/17 1030   • aspirin EC tablet 81 mg  81 mg Oral Daily Mark Hawthorne MD   81 mg at  07/23/17 0838   • atorvastatin (LIPITOR) tablet 40 mg  40 mg Oral Daily Mark Hawthorne MD   40 mg at 07/23/17 0838   • carvedilol (COREG) tablet 3.125 mg  3.125 mg Oral Q12H Romeo Agee MD   3.125 mg at 07/23/17 0837   • cefepime (MAXIPIME) 2 g/100 mL 0.9% NS (mbp)  2 g Intravenous Q24H Felipe Foster MD   2 g at 07/22/17 1024   • cholecalciferol (VITAMIN D3) tablet 5,000 Units  5,000 Units Oral Daily Heidi Medel MD   5,000 Units at 07/23/17 0837   • dextrose (D50W) solution 25 g  25 g Intravenous Q15 Min PRN Mark Hawthorne MD   25 g at 07/20/17 1418   • dextrose (GLUTOSE) oral gel 15 g  15 g Oral Q15 Min PRN Mark Hawthorne MD       • dextrose 5 % with KCl 20 mEq/L infusion  75 mL/hr Intravenous Continuous hSarron Mcneill MD 50 mL/hr at 07/22/17 1359 50 mL/hr at 07/22/17 1359   • famotidine (PEPCID) injection 20 mg  20 mg Intravenous Daily Mark Hawthorne MD        Or   • famotidine (PEPCID) tablet 20 mg  20 mg Oral Daily Mark Hawthorne MD   20 mg at 07/23/17 1029   • glucagon (human recombinant) (GLUCAGEN DIAGNOSTIC) injection 1 mg  1 mg Subcutaneous Q15 Min PRN Mark Hawthorne MD       • insulin aspart (novoLOG) injection 0-9 Units  0-9 Units Subcutaneous 4x Daily With Meals & Nightly Mark Hawthorne MD   2 Units at 07/22/17 2349   • insulin detemir (LEVEMIR) injection 18 Units  18 Units Subcutaneous Nightly Carlin Barry MD   18 Units at 07/22/17 2157   • magnesium sulfate in D5W 1g/100mL (PREMIX)  1 g Intravenous Q1H Sharron Mcneill MD   1 g at 07/23/17 0857   • minoxidil (LONITEN) tablet 5 mg  5 mg Oral BID Chris Kelly MD   5 mg at 07/23/17 0836   • morphine injection 4 mg  4 mg Intravenous Q2H PRN Leticia Stanton MD   4 mg at 07/15/17 1235   • naloxone (NARCAN) injection 0.4 mg  0.4 mg Intravenous Q5 Min PRN Leticia Stanton MD   0.4 mg at 07/19/17 0042   • nicotine (NICODERM CQ) 14 MG/24HR patch 1 patch  1 patch  Transdermal Q24H Leticia Stanton MD   1 patch at 07/22/17 2200   • nitroglycerin (NITROSTAT) SL tablet 0.4 mg  0.4 mg Sublingual Q5 Min PRN Mark Hawthorne MD       • nystatin (MYCOSTATIN) powder   Topical Q12H Carlin Barry MD       • ondansetron (ZOFRAN) tablet 4 mg  4 mg Oral Q6H PRN Mark Hawthorne MD        Or   • ondansetron ODT (ZOFRAN-ODT) disintegrating tablet 4 mg  4 mg Oral Q6H PRN Mark Hawthorne MD        Or   • ondansetron (ZOFRAN) injection 4 mg  4 mg Intravenous Q6H PRN Mark Hawthorne MD   4 mg at 07/16/17 1048   • oxyCODONE-acetaminophen (PERCOCET)  MG per tablet 2 tablet  2 tablet Oral Q6H PRN Mrak Hawthorne MD   2 tablet at 07/20/17 0157   • oxyCODONE-acetaminophen (PERCOCET) 5-325 MG per tablet 1 tablet  1 tablet Oral Q4H PRN Mark Hawthorne MD   1 tablet at 07/20/17 2140   • Pharmacy to dose vancomycin   Does not apply Continuous PRN Mark Hawthorne MD       • potassium chloride (MICRO-K) CR capsule 40 mEq  40 mEq Oral Q6H Aziz Brian Mcneill MD   40 mEq at 07/23/17 0857   • sennosides-docusate sodium (SENOKOT-S) 8.6-50 MG tablet 2 tablet  2 tablet Oral BID PRN Mark Hawthorne MD       • sertraline (ZOLOFT) tablet 100 mg  100 mg Oral Daily Mark Hawthorne MD   100 mg at 07/23/17 0838   • sodium bicarbonate tablet 1,300 mg  1,300 mg Oral TID Mikey Clarke MD   1,300 mg at 07/23/17 0836   • tamsulosin (FLOMAX) 24 hr capsule 0.8 mg  0.8 mg Oral Daily Heidi Medel MD   0.8 mg at 07/23/17 0837   • vancomycin 750 mg/250 mL 0.9% NS add-vantage  750 mg Intravenous Q24H Mark Hawthorne MD   750 mg at 07/22/17 1153       Assessment/Plan      1.  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 Cr is better. (peak 4.3; BL 2.0 as of 7/5/17).  Volume status is adequate.  2.  Urinary retention - segal in place, voiding trials soon.  Good UO.  3.  Revision of the left AKA  infected stump.  Covered.  4.  HTN - BP elevated.  In pain. Will increase norvasc to 5 mg po bid.  5.  Peripheral vascular disease and carotid disease.  6.  acute encephalopathy - resolved.   7.  Metabolic acidosis -on by mouth sodium bicarb, stable.  8.  Anemia - Hb down to 7.5, multifactorial.  Including iron deficiency.  Iron saturation 7% on 7/17/17.  Consider IV iron if infectious issues are under control. Transfusion is planned today.   9.  Sepsis, PNA - cefepime & vanc; pharmacy dosing latter  10.  Hypernatremia, poor by mouth water intake due to dysphagia.  Patient hates thickened water. Will increase D5W with 20 potassium chloride at 75 cc an hour and reevaluate in a.m.  11.  Hypokalemia. Will replace orally and recheck.   12.  Hypomagnesemia, will replace and recheck in am.   Discussed with patient and nursing staff.  Will follow.    Sharron Mcneill MD  07/23/17  11:00 AM

## 2017-07-23 NOTE — PLAN OF CARE
Problem: Patient Care Overview (Adult)  Goal: Plan of Care Review    07/23/17 5795   Coping/Psychosocial Response Interventions   Plan Of Care Reviewed With patient   Outcome Evaluation   Outcome Summary/Follow up Plan Progressing towards goals during physical therapy, able to stand at EOB w/ assist and use of walker. Limited by fatigue.

## 2017-07-23 NOTE — PROGRESS NOTES
"Pharmacokinetic Evaluation - Vancomycin    [id number]  1960  Jay Blackwood is a 57 y.o. male on vancomycin pharmacy to dose.    Day 10 during admit (on vanco prior to admission)  Consult for Dr Hawthorne. Eugenia Jung/ID is following.  Treating: MRSA + LLE stump osteomyelitis  Goal trough: 15-20 mcg/ml      Current dose: 750 mg iv q24h  Other antimicrobials: cefepime 2g q24       Blood pressure (!) 190/68, pulse 56, temperature 98 °F (36.7 °C), temperature source Oral, resp. rate 18, height 74\" (188 cm), weight 213 lb 14.4 oz (97 kg), SpO2 95 %.    Results from last 7 days  Lab Units 07/23/17  0527 07/22/17  0516 07/21/17  0620   CREATININE mg/dL 2.74* 3.28* 3.46*     Estimated Creatinine Clearance: 40.8 mL/min (by C-G formula based on Cr of 2.74).    Results from last 7 days  Lab Units 07/23/17  0527 07/21/17  0620 07/20/17  0542   WBC 10*3/mm3 10.07 10.74* 12.16*   HEMOGLOBIN g/dL 7.5* 8.1* 8.3*   HEMATOCRIT % 22.3* 24.5* 25.3*   PLATELETS 10*3/mm3 146 154 141     Procal: n/a     Cx:   7/21 sputum- mixed/epithelial cells  7/19 bc-ng x3  7/16 blood cx ng/final  7/16 C.diff negative  5/31 MRI with evidence of osteomyelitis per ID clinic note on 7/3  6/16 wound cx (previous admission) MRSA     Dosing hx:  Pt initiated on vanc outpt on 7/6 - at the time was on 1250 q24; home med rec notes 750 daily   7/14: 1500 mg q24 started - given at 2016  7/15: timing changed to 1400 - given at 1445  7/16 1410 random level: 25 mcg/ml; dosing held  7/17 0458 random level: 23.3 mcg/ml  7/18 0452 random level: 18.6 mcg/ml; Vancomycin 500mg IV @ 1814  7/19 0603= 19.9 mcg/ml; 500mg IV x1 @ 1518  7/20 0542 random: 16.8 mcg/ml; 750mg IV x1 @ 1215  7/21 0620 random: 19.9 mcg/ml. 750 q24:  1307  7/22  750 q24:  1153  7/23   1321 trough=18.8 (25.5 hrs)    Assessment:  POD#9 REVISION LT AMPUTATION ABOVE KNEE. Dose late today due to limited line access and magnesium infusing. Level drawn at 25.5 hrs was 18.8 mcg/ml but estimated trough if " drawn on time is 20.6 mcg/ml. Scr is trending down, will likely result in more vancomycin clearance although may not be at steady state with current dosing regimen. Continue same for now, recheck scr daily, and recheck vanc trough on 7/25 to reassess.        Plan:  1) Contine vancomycin 750 mg every 24 hours.  2) Next trough on 7/25 at 1430 (5th dose of new regimen)  3) Monitor scr daily if not already ordered.    Jam Chery Pharm.D, BCCCP

## 2017-07-23 NOTE — PROGRESS NOTES
Patient is status post revision of a left above-knee amputation for osteomyelitis.  He is doing reasonably well other than just feeling weak and tired.  His hemoglobin this morning was 7.5.  2 units of blood had been ordered.    History of his healing satisfactorily.  No signs of infection.  No edema of the stump.    He is doing relatively well and should improve with transfusions.  He also has a low serum magnesium and this is being replaced as well.

## 2017-07-23 NOTE — PLAN OF CARE
Problem: Patient Care Overview (Adult)  Goal: Plan of Care Review  Outcome: Ongoing (interventions implemented as appropriate)    07/23/17 0440   Coping/Psychosocial Response Interventions   Plan Of Care Reviewed With patient   Patient Care Overview   Progress progress toward functional goals as expected   Outcome Evaluation   Outcome Summary/Follow up Plan B/P elevated, MD's notified and is aware. Medications changed per Nephrology.          Problem: Fall Risk (Adult)  Goal: Absence of Falls  Outcome: Ongoing (interventions implemented as appropriate)    Problem: Pain, Acute (Adult)  Goal: Acceptable Pain Control/Comfort Level  Outcome: Ongoing (interventions implemented as appropriate)    Problem: Infection, Risk/Actual (Adult)  Goal: Infection Prevention/Resolution  Outcome: Ongoing (interventions implemented as appropriate)

## 2017-07-24 LAB
ABO + RH BLD: NORMAL
ABO + RH BLD: NORMAL
ALBUMIN SERPL-MCNC: 2 G/DL (ref 3.5–5.2)
ANION GAP SERPL CALCULATED.3IONS-SCNC: 12.7 MMOL/L
BACTERIA SPEC AEROBE CULT: NORMAL
BACTERIA SPEC AEROBE CULT: NORMAL
BH BB BLOOD EXPIRATION DATE: NORMAL
BH BB BLOOD EXPIRATION DATE: NORMAL
BH BB BLOOD TYPE BARCODE: 600
BH BB BLOOD TYPE BARCODE: 600
BH BB DISPENSE STATUS: NORMAL
BH BB DISPENSE STATUS: NORMAL
BH BB PRODUCT CODE: NORMAL
BH BB PRODUCT CODE: NORMAL
BH BB UNIT NUMBER: NORMAL
BH BB UNIT NUMBER: NORMAL
BUN BLD-MCNC: 24 MG/DL (ref 6–20)
BUN/CREAT SERPL: 9.2 (ref 7–25)
CALCIUM SPEC-SCNC: 8 MG/DL (ref 8.6–10.5)
CHLORIDE SERPL-SCNC: 114 MMOL/L (ref 98–107)
CO2 SERPL-SCNC: 18.3 MMOL/L (ref 22–29)
CREAT BLD-MCNC: 2.62 MG/DL (ref 0.76–1.27)
DEPRECATED RDW RBC AUTO: 58.9 FL (ref 37–54)
ERYTHROCYTE [DISTWIDTH] IN BLOOD BY AUTOMATED COUNT: 17.9 % (ref 11.5–14.5)
GFR SERPL CREATININE-BSD FRML MDRD: 25 ML/MIN/1.73
GLUCOSE BLD-MCNC: 117 MG/DL (ref 65–99)
GLUCOSE BLDC GLUCOMTR-MCNC: 159 MG/DL (ref 70–130)
GLUCOSE BLDC GLUCOMTR-MCNC: 237 MG/DL (ref 70–130)
HCT VFR BLD AUTO: 28.8 % (ref 40.4–52.2)
HGB BLD-MCNC: 9.5 G/DL (ref 13.7–17.6)
MAGNESIUM SERPL-MCNC: 2 MG/DL (ref 1.6–2.6)
MCH RBC QN AUTO: 29.4 PG (ref 27–32.7)
MCHC RBC AUTO-ENTMCNC: 33 G/DL (ref 32.6–36.4)
MCV RBC AUTO: 89.2 FL (ref 79.8–96.2)
NT-PROBNP SERPL-MCNC: ABNORMAL PG/ML (ref 0–900)
PHOSPHATE SERPL-MCNC: 2.3 MG/DL (ref 2.5–4.5)
PLATELET # BLD AUTO: 166 10*3/MM3 (ref 140–500)
PMV BLD AUTO: 10 FL (ref 6–12)
POTASSIUM BLD-SCNC: 3.5 MMOL/L (ref 3.5–5.2)
POTASSIUM BLD-SCNC: 3.7 MMOL/L (ref 3.5–5.2)
RBC # BLD AUTO: 3.23 10*6/MM3 (ref 4.6–6)
SODIUM BLD-SCNC: 145 MMOL/L (ref 136–145)
UNIT  ABO: NORMAL
UNIT  ABO: NORMAL
UNIT  RH: NORMAL
UNIT  RH: NORMAL
WBC NRBC COR # BLD: 8.97 10*3/MM3 (ref 4.5–10.7)

## 2017-07-24 PROCEDURE — 25010000002 ALTEPLASE: Performed by: INTERNAL MEDICINE

## 2017-07-24 PROCEDURE — 85027 COMPLETE CBC AUTOMATED: CPT | Performed by: SURGERY

## 2017-07-24 PROCEDURE — 99232 SBSQ HOSP IP/OBS MODERATE 35: CPT | Performed by: INTERNAL MEDICINE

## 2017-07-24 PROCEDURE — 99231 SBSQ HOSP IP/OBS SF/LOW 25: CPT | Performed by: INTERNAL MEDICINE

## 2017-07-24 PROCEDURE — 83735 ASSAY OF MAGNESIUM: CPT | Performed by: INTERNAL MEDICINE

## 2017-07-24 PROCEDURE — 83880 ASSAY OF NATRIURETIC PEPTIDE: CPT | Performed by: INTERNAL MEDICINE

## 2017-07-24 PROCEDURE — 63710000001 INSULIN ASPART PER 5 UNITS: Performed by: SURGERY

## 2017-07-24 PROCEDURE — 97110 THERAPEUTIC EXERCISES: CPT

## 2017-07-24 PROCEDURE — 82962 GLUCOSE BLOOD TEST: CPT

## 2017-07-24 PROCEDURE — 80069 RENAL FUNCTION PANEL: CPT | Performed by: INTERNAL MEDICINE

## 2017-07-24 PROCEDURE — 25010000002 CEFEPIME: Performed by: INTERNAL MEDICINE

## 2017-07-24 RX ADMIN — INSULIN ASPART 2 UNITS: 100 INJECTION, SOLUTION INTRAVENOUS; SUBCUTANEOUS at 21:54

## 2017-07-24 RX ADMIN — APIXABAN 5 MG: 5 TABLET, FILM COATED ORAL at 21:54

## 2017-07-24 RX ADMIN — SODIUM BICARBONATE 1300 MG: 650 TABLET ORAL at 17:14

## 2017-07-24 RX ADMIN — SODIUM BICARBONATE 1300 MG: 650 TABLET ORAL at 21:54

## 2017-07-24 RX ADMIN — NYSTATIN: 100000 POWDER TOPICAL at 09:11

## 2017-07-24 RX ADMIN — SODIUM BICARBONATE 1300 MG: 650 TABLET ORAL at 09:08

## 2017-07-24 RX ADMIN — INSULIN ASPART 4 UNITS: 100 INJECTION, SOLUTION INTRAVENOUS; SUBCUTANEOUS at 17:15

## 2017-07-24 RX ADMIN — CARVEDILOL 3.12 MG: 3.12 TABLET, FILM COATED ORAL at 09:09

## 2017-07-24 RX ADMIN — TAMSULOSIN HYDROCHLORIDE 0.8 MG: 0.4 CAPSULE ORAL at 09:08

## 2017-07-24 RX ADMIN — SERTRALINE 100 MG: 100 TABLET, FILM COATED ORAL at 09:09

## 2017-07-24 RX ADMIN — AMLODIPINE BESYLATE 5 MG: 5 TABLET ORAL at 17:14

## 2017-07-24 RX ADMIN — ASPIRIN 81 MG: 81 TABLET ORAL at 09:09

## 2017-07-24 RX ADMIN — FAMOTIDINE 20 MG: 20 TABLET, FILM COATED ORAL at 09:09

## 2017-07-24 RX ADMIN — CEFEPIME 2 G: 2 INJECTION, POWDER, FOR SOLUTION INTRAVENOUS at 11:17

## 2017-07-24 RX ADMIN — NICOTINE 1 PATCH: 14 PATCH, EXTENDED RELEASE TRANSDERMAL at 21:54

## 2017-07-24 RX ADMIN — APIXABAN 5 MG: 5 TABLET, FILM COATED ORAL at 09:07

## 2017-07-24 RX ADMIN — ALTEPLASE 2 MG: 2.2 INJECTION, POWDER, LYOPHILIZED, FOR SOLUTION INTRAVENOUS at 03:15

## 2017-07-24 RX ADMIN — VITAMIN D, TAB 1000IU (100/BT) 5000 UNITS: 25 TAB at 09:07

## 2017-07-24 RX ADMIN — ATORVASTATIN CALCIUM 40 MG: 20 TABLET, FILM COATED ORAL at 09:09

## 2017-07-24 RX ADMIN — MINOXIDIL 5 MG: 2.5 TABLET ORAL at 17:14

## 2017-07-24 RX ADMIN — INSULIN DETEMIR 18 UNITS: 100 INJECTION, SOLUTION SUBCUTANEOUS at 21:56

## 2017-07-24 RX ADMIN — AMLODIPINE BESYLATE 5 MG: 5 TABLET ORAL at 09:08

## 2017-07-24 RX ADMIN — MINOXIDIL 5 MG: 2.5 TABLET ORAL at 09:08

## 2017-07-24 RX ADMIN — CARVEDILOL 3.12 MG: 3.12 TABLET, FILM COATED ORAL at 23:08

## 2017-07-24 RX ADMIN — VANCOMYCIN HYDROCHLORIDE 750 MG: 750 INJECTION, POWDER, LYOPHILIZED, FOR SOLUTION INTRAVENOUS at 14:15

## 2017-07-24 NOTE — PROGRESS NOTES
INFECTIOUS DISEASES PROGRESS NOTE    CC: f/u MRSA stump infection    S: Afebrile, no new complaints.  Confusion resolved.  Breathing comfortably on room air.  No cough.  Continues to report left lower stump pain.  No abdominal pain nausea vomiting or diarrhea.  Tolerating medication without a rash.     O:  Physical Exam:  Temp:  [97 °F (36.1 °C)-98.9 °F (37.2 °C)] 98.2 °F (36.8 °C)  Heart Rate:  [56-71] 58  Resp:  [16-18] 18  BP: (122-194)/(43-81) 154/73   93% on 3 L nasal cannula    Physical Exam   No apparent distress  Regular rate and rhythm no lower extremity edema   Soft nontender nondistended  Left stump incision looking much better with resultant erythema or warmth and swelling   No rashes    Antibiotics  Cefepime 2 g IV every 24 hours  Vancomycin 750 mg IV each 24 hours    Diagnostics:    Lab Results   Component Value Date    WBC 8.97 07/24/2017    HGB 9.5 (L) 07/24/2017    HCT 28.8 (L) 07/24/2017    MCV 89.2 07/24/2017     07/24/2017     Lab Results   Component Value Date    GLUCOSE 117 (H) 07/24/2017    BUN 24 (H) 07/24/2017    CREATININE 2.62 (H) 07/24/2017    EGFRIFNONA 25 (L) 07/24/2017    EGFRIFAFRI  09/15/2016      Comment:      <15 Indicative of kidney failure.    BCR 9.2 07/24/2017    K 3.7 07/24/2017    CO2 18.3 (L) 07/24/2017    CALCIUM 8.0 (L) 07/24/2017    ALBUMIN 2.00 (L) 07/24/2017    LABIL2 0.7 07/19/2017    AST 16 07/19/2017    ALT 6 07/19/2017     Microbiology:  7/21 SCx candida   7/19 BCx NGTD x2   7/16 C diff negative  7/16 BCx NGTDx2  6/16 Wound cx MRSA    Assessment/Plan   1. Left stump osteomyelitis with abscess status post revision of the left above-the-knee amputation on 7/14. Prior wound cultures with MRSA  - continue vancomycin dosing per pharmacy  - Abx stop date August 25, 2017  - Weekly CBC/diff, serum creatinine, and vancomycin trough. Please fax the results to the ID clinic at [phone]  - ID follow up 8/25    2.  Fever 2/2 #3 - resolved  - Blood cultures negative to  date    3.  Healthcare associated pneumonia/aspiration pneumonia  - Continue empiric cefepime renally dosed.  Day 5 will treat for 2 more days and DC  - Sputum culture with Candida no need to treat    4.  Acute hypoxic respiratory failure secondary to #3 above resolved     5. Type 2 diabetes complicating above     6. Peripheral vascular disease complicating above     7. Acute on CKD III - improving  - Nephrology following    8.  Confusion - resolved    ID will follow PRN. Please call with questions or concerns.       Eugenia Jung MD  8:03 AM  07/24/17

## 2017-07-24 NOTE — THERAPY TREATMENT NOTE
Acute Care - Physical Therapy Treatment Note  [facility]     Patient Name: Jay Blackwood  : 1960  MRN: [id number]  Today's Date: 2017  Onset of Illness/Injury or Date of Surgery Date: 17     Referring Physician: Christoph Rivers Date: 2017    Visit Dx:    ICD-10-CM ICD-9-CM   1. Decreased mobility R26.89 781.99   2. Chronic osteomyelitis M86.60 730.10     Patient Active Problem List   Diagnosis   • OA (osteoarthritis) of knee   • Diabetes mellitus   • Hypertension   • A-fib   • Neuropathic pain   • PVD (peripheral vascular disease)   • HLD (hyperlipidemia)   • Chronic osteomyelitis of left femur   • Chronic osteomyelitis   • Diabetes mellitus type 2, insulin dependent               Adult Rehabilitation Note       17 1600 17 1338 17 1300    Rehab Assessment/Intervention    Discipline physical therapy assistant  -CW physical therapist  -AR physical therapy assistant  -RH    Document Type therapy note (daily note)  -CW therapy note (daily note)  -AR therapy note (daily note)  -RH    Subjective Information agree to therapy;complains of;weakness;fatigue;pain  -CW agree to therapy  -AR agree to therapy;weakness;fatigue;pain  -RH    Patient Effort, Rehab Treatment adequate  -CW good  -AR     Symptoms Noted During/After Treatment   none  -RH    Precautions/Limitations fall precautions  -CW fall precautions  -AR fall precautions  -RH    Precautions/Limitations, Vision   WFL  -RH    Precautions/Limitations, Hearing   WFL  -RH    Specific Treatment Considerations   L AKA revision  -RH    Recorded by [CW] Misha Tran [AR] Mariangel Osei, PT [RH] Sea Piedra, PTA    Vital Signs    Pre SpO2 (%)   95  -RH    O2 Delivery Pre Treatment room air  -CW  room air  -RH    Recorded by [CW] Misha Tran  [RH] Sea Piedra, PTA    Pain Assessment    Pain Assessment 0-10  -CW 0-10  -AR 0-10  -RH    Pain Score 6  -CW 5  -AR 6  -RH    Pain Location Generalized  -CW  --    neck/shoulder/knee/R resudial limb  -RH    Pain Intervention(s) Repositioned;Ambulation/increased activity  -CW      Recorded by [CW] Misha Tran [AR] Mariangel Osei PT [RH] Sea Piedra, QUINTON    Cognitive Assessment/Intervention    Current Cognitive/Communication Assessment functional  -CW functional  -AR functional  -RH    Orientation Status oriented x 4  -CW      Follows Commands/Answers Questions 100% of the time  -CW  100% of the time;needs cueing  -RH    Personal Safety mild impairment;decreased awareness, need for safety;decreased insight to deficits  -CW  mild impairment  -RH    Personal Safety Interventions fall prevention program maintained;gait belt;muscle strengthening facilitated;nonskid shoes/slippers when out of bed  -CW  fall prevention program maintained  -RH    Recorded by [CW] Misha Tran [AR] Mariangel Osei, PT [RH] Sea Piedra, QUINTON    Bed Mobility, Assessment/Treatment    Bed Mobility, Assistive Device  bed rails;head of bed elevated  -AR bed rails  -RH    Bed Mobility, Scoot/Bridge, [place] supervision required  -CW minimum assist (75% patient effort)  -AR minimum assist (75% patient effort)  -RH    Bed Mob, Supine to Sit, [place] supervision required  -CW not tested  -AR minimum assist (75% patient effort);moderate assist (50% patient effort)  -RH    Recorded by [CW] Misha Tran [AR] Mariangel Osei PT [RH] Sea Piedra, QUINTON    Transfer Assessment/Treatment    Transfers, Sit-Stand [place] moderate assist (50% patient effort)  -CW moderate assist (50% patient effort)  -AR     Transfers, Stand-Sit [place] minimum assist (75% patient effort)  -CW minimum assist (75% patient effort)  -AR     Transfers, Sit-Stand-Sit, Assist Device rolling walker  -CW rolling walker  -AR     Transfer, Comment   not tested  -RH    Recorded by [CW] Misha Tran [AR] Mariangel Osei PT [RH] Sea Piedra, QUINTON    Gait Assessment/Treatment    Gait,  [place] Level not tested  -CW  not appropriate to assess  -RH    Gait, Comment  1 pivot towards HOB  -AR     Recorded by [CW] Misha Tran [AR] Mariangel Osei, PT [RH] Sea Piedra PTA    Balance Skills Training    Sitting-Level of Assistance   Close supervision  -RH    Sitting-Balance Support   Right upper extremity supported;Left upper extremity supported;Feet supported   R foot  -RH    Sitting-Balance Activities   Forward lean;Head control activities (Comment);Trunk control activities  -RH    Sitting # of Minutes   10  -RH    Recorded by   [RH] Sea Piedra PTA    Therapy Exercises    Right Lower Extremity AROM:;10 reps;supine;heel slides;hip abduction/adduction;ankle pumps/circles  -CW      Recorded by [CW] Misha Tran      Positioning and Restraints    Pre-Treatment Position in bed  -CW in bed  -AR in bed  -RH    Post Treatment Position bed  -CW bed  -AR bed  -RH    In Bed notified nsg;supine;call light within reach;encouraged to call for assist  -CW sitting EOB;call light within reach;encouraged to call for assist  -AR supine;exit alarm on  -RH    Recorded by [CW] Misha Tran [AR] Mariangel Osei, PT [RH] Sea Piedra PTA      User Key  (r) = Recorded By, (t) = Taken By, (c) = Cosigned By    Initials Name Effective Dates    AR Mariangel Osei, PT 06/27/16 -      Sea Piedra, QUINTON 02/18/16 -     CW Misha Tran 12/13/16 -                 IP PT Goals       07/17/17 1310          Transfer Training PT LTG    Transfer Training PT LTG, Date Established 07/17/17  -KH (r) GERA (t) KH (c)      Transfer Training PT LTG, Time to Achieve 1 wk  -KH (r) GERA (t) KH (c)      Transfer Training PT LTG, Activity Type all transfers  -KH (r) GERA (t) KH (c)      Transfer Training PT LTG, [place] Level contact guard assist  -KH (r) GERA (t) KH (c)      Transfer Training PT LTG, Assist Device --   Appropriate AD  -KH (r) GERA (t) KH (c)      Gait Training PT LTG    Gait Training  Goal PT LTG, Date Established 07/17/17  -KH (r) GERA (t) KH (c)      Gait Training Goal PT LTG, Time to Achieve 1 wk  -KH (r) GERA (t) KH (c)      Gait Training Goal PT LTG, [place] Level contact guard assist;2 person assist required  -KH (r) GERA (t) KH (c)      Gait Training Goal PT LTG, Assist Device --   Appropriate AD  -KH (r) GERA (t) KH (c)      Gait Training Goal PT LTG, Distance to Achieve 20  -KH (r) GERA (t) KH (c)        User Key  (r) = Recorded By, (t) = Taken By, (c) = Cosigned By    Initials Name Provider Type    ARMANI Sifuentes, PT Physical Therapist    GERA Flowers, PT Student PT Student          Physical Therapy Education     Title: PT OT SLP Therapies (Done)     Topic: Physical Therapy (Done)     Point: Mobility training (Done)    Learning Progress Summary    Learner Readiness Method Response Comment Documented by Status   [facility] Done    Acceptance E VU  AR 07/23/17 1340 Done    Acceptance E VU,NR   07/20/17 0958 Done    Acceptance E VU,NR   07/18/17 0935 Done    Acceptance E VU,NR   07/17/17 1310 Done               Point: Home exercise program (Done)    Learning Progress Summary    Learner Readiness Method Response Comment Documented by Status   [facility] 164[facility] Done    Acceptance E VU  AR 07/23/17 1340 Done    Acceptance E VU,NR   07/20/17 0958 Done    Acceptance E VU,NR   07/18/17 0935 Done    Acceptance E VU,NR   07/17/17 1310 Done               Point: Body mechanics (Done)    Learning Progress Summary    Learner Readiness Method Response Comment Documented by Status   [facility] 07/[facility]17 1642 Done    Acceptance E VU  AR 07/23/17 1340 Done    Acceptance E VU,NR  SHARI 07/20/17 0958 Done    Acceptance E VU,NR  SHARI 07/18/17 0935 Done    Acceptance E VU,NR   07/17/17 1310 Done               Point: Precautions (Done)    Learning Progress Summary    Learner Readiness Method  Response Comment Documented by Status   Patient Acceptance E,TB,D VU,DU   07/24/17 1642 Done    Acceptance E VU  AR 07/23/17 1340 Done    Acceptance E NR  JW 07/21/17 1127 Active    Acceptance E VU,NR  SHARI 07/20/17 0958 Done    Acceptance E VU,NR  SHARI 07/18/17 0935 Done    Acceptance E VU,NR  GERA 07/17/17 1310 Done                      User Key     Initials Effective Dates Name Provider Type Discipline     02/18/16 -  Kelli Rodriguez, PTA Physical Therapy Assistant PT    AR 06/27/16 -  Mariangel Osei, PT Physical Therapist PT    SHARI 04/24/15 -  Sai Stock, PTA Physical Therapy Assistant PT     12/13/16 -  Misha Tran Physical Therapy Assistant PT    GERA 05/08/17 -  Reggie Flowers, PT Student PT Student PT                    PT Recommendation and Plan  Anticipated Equipment Needs At Discharge: walker  Anticipated Discharge Disposition: inpatient rehabilitation facility, home with home health  Planned Therapy Interventions: balance training, gait training, home exercise program, patient/family education, strengthening, transfer training  PT Frequency: daily  Plan of Care Review  Plan Of Care Reviewed With: patient  Progress: improving  Outcome Summary/Follow up Plan: Pt increasing with strength and balance when sitting EOB doing ther ex          Outcome Measures       07/24/17 1600 07/23/17 1300 07/22/17 1300    How much help from another person do you currently need...    Turning from your back to your side while in flat bed without using bedrails? 4  -CW 4  -AR 3  -RH    Moving from lying on back to sitting on the side of a flat bed without bedrails? 3  -CW 3  -AR 2  -RH    Moving to and from a bed to a chair (including a wheelchair)? 2  -CW 2  -AR 1  -RH    Standing up from a chair using your arms (e.g., wheelchair, bedside chair)? 2  -CW 2  -AR 1  -RH    Climbing 3-5 steps with a railing? 1  -CW 1  -AR 1  -RH    To walk in hospital room? 1  -CW 1  -AR 1  -RH    AM-PAC 6 Clicks Score 13  -CW 13  -AR 9   -    Functional Assessment    Outcome Measure Options AM-PAC 6 Clicks Basic Mobility (PT)  -CW        User Key  (r) = Recorded By, (t) = Taken By, (c) = Cosigned By    Initials Name Provider Type    AR Mariangel Osei, PT Physical Therapist    RH Sea Piedra, PTA Physical Therapy Assistant    CW Misha Tran Physical Therapy Assistant           Time Calculation:         PT Charges       07/24/17 1643          Time Calculation    Start Time 1630  -CW      Stop Time 1643  -CW      Time Calculation (min) 13 min  -CW      PT Received On 07/24/17  -CW      PT - Next Appointment 07/25/17  -CW        User Key  (r) = Recorded By, (t) = Taken By, (c) = Cosigned By    Initials Name Provider Type    CW Misha Tran Physical Therapy Assistant          Therapy Charges for Today     Code Description Service Date Service Provider Modifiers Qty    99756998931 HC PT THER PROC EA 15 MIN 7/24/2017 Misha Tran GP 1          PT G-Codes  Outcome Measure Options: AM-PAC 6 Clicks Basic Mobility (PT)    Misha Tran  7/24/2017

## 2017-07-24 NOTE — PROGRESS NOTES
[facility] INPATIENT PROGRESS NOTE         [address]    7/24/2017      PATIENT IDENTIFICATION:   Name:  Jay Blackwood      MRN:  [id number]     57 y.o.  male             LOS 10    Reason for visit: f/u PNA      SUBJECTIVE:    Events noted chart reviewed.  No specific complaints today.    Objective   OBJECTIVE:    Vital Sign Min/Max for last 24 hours  Temp  Min: 97 °F (36.1 °C)  Max: 98.5 °F (36.9 °C)   BP  Min: 135/70  Max: 167/58   Pulse  Min: 58  Max: 71   Resp  Min: 16  Max: 18   No Data Recorded   No Data Recorded   No Data Recorded           Body mass index is 27.46 kg/(m^2).    Intake/Output Summary (Last 24 hours) at 07/24/17 1627  Last data filed at 07/24/17 1415   Gross per 24 hour   Intake          1204.67 ml   Output             1050 ml   Net           154.67 ml     \  Exam:  GEN:  No distress, appears stated age  EYES:   PERRL, anicteric sclera  ENT:    External ears/nose normal, OP clear  NECK:  No adenopathy, midline trachea  LUNGS: Normal chest on inspection, palpation and scattered course BS at bases on auscultation  CV:  Normal S1S2, without murmur  ABD:  Non tender, non distended, no hepatosplenomegaly, +BS  EXT:  No edema, cyanosis or clubbing    Scheduled meds:      amLODIPine 5 mg Oral BID   apixaban 5 mg Oral Q12H   aspirin 81 mg Oral Daily   atorvastatin 40 mg Oral Daily   carvedilol 3.125 mg Oral Q12H   cefepime 2 g Intravenous Q24H   cholecalciferol 5,000 Units Oral Daily   famotidine 20 mg Intravenous Daily   Or      famotidine 20 mg Oral Daily   insulin aspart 0-9 Units Subcutaneous 4x Daily With Meals & Nightly   insulin detemir 18 Units Subcutaneous Nightly   minoxidil 5 mg Oral BID   nicotine 1 patch Transdermal Q24H   nystatin  Topical Q12H   sertraline 100 mg Oral Daily   sodium bicarbonate 1,300 mg Oral TID   tamsulosin 0.8 mg Oral Daily   vancomycin 750 mg Intravenous Q24H     IV meds:                          Pharmacy to dose vancomycin      Data  Review:    Results from last 7 days  Lab Units 07/24/17  0441 07/23/17  2305 07/23/17  0527 07/22/17  0516 07/21/17  0620 07/20/17  0542   SODIUM mmol/L 145  --  148* 147* 143 140   POTASSIUM mmol/L 3.7 3.5 3.1* 3.2* 3.7 4.1   CHLORIDE mmol/L 114*  --  114* 115* 112* 108*   CO2 mmol/L 18.3*  --  19.4* 19.1* 17.1* 17.1*   BUN mg/dL 24*  --  29* 34* 37* 42*   CREATININE mg/dL 2.62*  --  2.74* 3.28* 3.46* 3.77*   GLUCOSE mg/dL 117*  --  103* 82 166* 165*   CALCIUM mg/dL 8.0*  --  7.5* 8.2* 7.8* 7.9*         Estimated Creatinine Clearance: 42.7 mL/min (by C-G formula based on Cr of 2.62).    Results from last 7 days  Lab Units 07/24/17  0441 07/23/17  0527 07/21/17  0620 07/20/17  0542 07/19/17  0603   WBC 10*3/mm3 8.97 10.07 10.74* 12.16* 12.33*   HEMOGLOBIN g/dL 9.5* 7.5* 8.1* 8.3* 9.2*   PLATELETS 10*3/mm3 166 146 154 141 150       Results from last 7 days  Lab Units 07/18/17  2350   INR  1.18*       Results from last 7 days  Lab Units 07/19/17  0603   ALT (SGPT) U/L 6   AST (SGOT) U/L 16       Results from last 7 days  Lab Units 07/21/17  1248 07/19/17  0705 07/19/17  0050   PH, ARTERIAL pH units 7.320* 7.240* 7.318*   PO2 ART mm Hg 75.5* 65.8* 48.3*   PCO2, ARTERIAL mm Hg 33.1* 34.6* 29.9*   HCO3 ART mmol/L 17.0* 14.8* 15.3*       Assessment   ASSESSMENT:   Bilateral lower lobe pneumonia/pneumonitis with signs of potential aspiration component  Acute hypoxemic respiratory failure secondary to pneumonia  NORY: Previously intolerant positive airway pressure  Metabolic acidosis with anion gap  Left stump osteomyelitis with abscess status post revision and left above-knee amputation 7/14  History MRSA  Diabetes mellitus type 2  Acute on chronic kidney disease  Tobacco abuse: Suspect component of COPD  Dysphagia: on dysphagia diet       PLAN:  Abx per ID  Aggressive pulmonate toilet  Ambulate  Encourage quit smoking.  Multiple medical issues noted  Pulmonary status stable      Kiki Farah MD  Pulmonary and Critical  [facility] Pulmonary [facility], [facility]  7/24/2017

## 2017-07-24 NOTE — PROGRESS NOTES
LOS: 10 days   Patient Care Team:  JOELLE Pak as PCP - General (Family Medicine)  Prosper Oh MD as Consulting Physician (Cardiology)    Chief Complaint: Follow-up for paroxysmal atrial fibrillation.      Interval History: Sinus rhythm.  No acute events.      Vital Signs:  Temp:  [97 °F (36.1 °C)-98.9 °F (37.2 °C)] 98.2 °F (36.8 °C)  Heart Rate:  [56-71] 58  Resp:  [16-18] 18  BP: (122-194)/(43-81) 154/73    Intake/Output Summary (Last 24 hours) at 07/24/17 0824  Last data filed at 07/24/17 0628   Gross per 24 hour   Intake             1340 ml   Output             1050 ml   Net              290 ml       Physical Exam:   General Appearance:    No acute distress, alert and oriented x4   Lungs:     Clear to auscultation bilaterally     Heart:    Regular rhythm and normal rate.  No murmurs, gallops, or       rubs.   Abdomen:     Soft, non-tender, non-distended.    Extremities:   Status post left AKA     Results Review:      Results from last 7 days  Lab Units 07/24/17  0441   SODIUM mmol/L 145   POTASSIUM mmol/L 3.7   CHLORIDE mmol/L 114*   CO2 mmol/L 18.3*   BUN mg/dL 24*   CREATININE mg/dL 2.62*   GLUCOSE mg/dL 117*   CALCIUM mg/dL 8.0*           Results from last 7 days  Lab Units 07/24/17  0441   WBC 10*3/mm3 8.97   HEMOGLOBIN g/dL 9.5*   HEMATOCRIT % 28.8*   PLATELETS 10*3/mm3 166       Results from last 7 days  Lab Units 07/21/17  0620 07/20/17  0542 07/19/17  2133  07/18/17  2350   INR   --   --   --   --  1.18*   APTT seconds 61.0* 80.3* 54.5*  < > 41.2*   < > = values in this interval not displayed.        Results from last 7 days  Lab Units 07/24/17  0441   MAGNESIUM mg/dL 2.0           I reviewed the patient's new clinical results.        Assessment:  1.  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 Acute blood loss anemia post-op (requiring transfusion)  4. Acute on chronic kidney injury  5. Paroxysmal atrial fibrillation  6.  Coronary artery disease with prior stenting   7. EF 40-45% by echo 11/16  8. PVD with carotid disease  9. Diabetes    Plan:  -Did require transfusion.  Back on Eliquis 5 mg bid for the PAF - this is the appropriate dose.  -Continue Coreg.  -Only one really significant episode of atrial fibrillation during hospital stay.      -Will sign-off for now - please call if needed, and happy to see again.  He follows with Jamee Pang at [facility] as an outpatient.    Thanks     Romeo Agee MD  07/24/17  8:24 AM

## 2017-07-24 NOTE — PROGRESS NOTES
"   LOS: 10 days    Patient Care Team:  JOELLE Pak as PCP - General (Family Medicine)  Prosper Oh MD as Consulting Physician (Cardiology)    Chief Complaint:  No chief complaint on file.      Subjective follow-up acute kidney injury on chronic kidney disease    Interval History:   Patient is feeling much better, no chest pain or shortness of air, no orthopnea or PND, no nausea or vomiting.  He is tolerating his oral intake without any problems and he has currently Garcia catheter anchored in place.      Review of Systems:   As noted above    Objective     Vital Signs  Temp:  [97 °F (36.1 °C)-98.9 °F (37.2 °C)] 98.2 °F (36.8 °C)  Heart Rate:  [56-71] 58  Resp:  [16-18] 18  BP: (122-194)/(43-81) 154/73    Flowsheet Rows         First Filed Value    Admission Height  74\" (188 cm) Documented at 07/14/2017 1002    Admission Weight  220 lb (99.8 kg) Documented at 07/14/2017 1002          I/O this shift:  In: 120 [P.O.:120]  Out: -   I/O last 3 completed shifts:  In: 1340 [P.O.:600; Blood:740]  Out: 2500 [Urine:2500]    Intake/Output Summary (Last 24 hours) at 07/24/17 0908  Last data filed at 07/24/17 0841   Gross per 24 hour   Intake             1460 ml   Output             1050 ml   Net              410 ml       Physical Exam:  General Appearance: alert, oriented x 3, in mild distress, morbidly obese,   Skin: warm and dry  Neck: supple, no JVD, trachea midline  Lungs: Bilateral rhonchi, unlabored breathing effort  Heart: RRR, normal S1 and S2, no S3, no rub  Abdomen: soft, non-tender, no palpable bladder, present bowel sounds to auscultation  : Garcia catheter anchored in place  Extremities: No ankle edema of the right leg, many well-healed scars, he had left AKA stump is wrapped  Neuro: normal speech and mental status      Results Review:      Results from last 7 days  Lab Units 07/24/17  0441 07/23/17  2305 07/23/17  0527 07/22/17  0516  07/19/17  0603   SODIUM mmol/L 145  --  148* 147*  < > 140 "   POTASSIUM mmol/L 3.7 3.5 3.1* 3.2*  < > 4.3   CHLORIDE mmol/L 114*  --  114* 115*  < > 110*   CO2 mmol/L 18.3*  --  19.4* 19.1*  < > 13.9*   BUN mg/dL 24*  --  29* 34*  < > 41*   CREATININE mg/dL 2.62*  --  2.74* 3.28*  < > 4.28*   CALCIUM mg/dL 8.0*  --  7.5* 8.2*  < > 7.6*   BILIRUBIN mg/dL  --   --   --   --   --  0.2   ALK PHOS U/L  --   --   --   --   --  77   ALT (SGPT) U/L  --   --   --   --   --  6   AST (SGOT) U/L  --   --   --   --   --  16   GLUCOSE mg/dL 117*  --  103* 82  < > 113*   < > = values in this interval not displayed.    Estimated Creatinine Clearance: 42.7 mL/min (by C-G formula based on Cr of 2.62).      Results from last 7 days  Lab Units 07/24/17  0441 07/23/17  0527 07/22/17  0516 07/21/17  0620   MAGNESIUM mg/dL 2.0 1.4*  --  1.7   PHOSPHORUS mg/dL 2.3* 2.0* 3.1 4.8*         Results from last 7 days  Lab Units 07/21/17  0620 07/18/17  0452   URIC ACID mg/dL 9.2* 9.4*         Results from last 7 days  Lab Units 07/24/17  0441 07/23/17  0527 07/21/17  0620 07/20/17  0542 07/19/17  0603   WBC 10*3/mm3 8.97 10.07 10.74* 12.16* 12.33*   HEMOGLOBIN g/dL 9.5* 7.5* 8.1* 8.3* 9.2*   PLATELETS 10*3/mm3 166 146 154 141 150         Results from last 7 days  Lab Units 07/18/17  2350   INR  1.18*         Imaging Results (last 24 hours)     Procedure Component Value Units Date/Time    FL Video Swallow [[id number]] Collected:  07/23/17 1225     Updated:  07/23/17 1239    Narrative:       VIDEO ESOPHAGRAM     HISTORY: Dysphagia.     FINDINGS:  The patient exhibits mild-to-moderate oropharyngeal dysphagia  with penetration of thin liquid and mixed consistencies. There was 1  episode of silent aspiration with solids. Please also see the speech  therapist's report. The fluoroscopy time measures 3 minutes 18 seconds.     This report was finalized on 7/23/2017 12:36 PM by Dr. Margarito Martinez MD.             amLODIPine 5 mg Oral BID   apixaban 5 mg Oral Q12H   aspirin 81 mg Oral Daily   atorvastatin 40 mg Oral  Daily   carvedilol 3.125 mg Oral Q12H   cefepime 2 g Intravenous Q24H   cholecalciferol 5,000 Units Oral Daily   famotidine 20 mg Intravenous Daily   Or      famotidine 20 mg Oral Daily   insulin aspart 0-9 Units Subcutaneous 4x Daily With Meals & Nightly   insulin detemir 18 Units Subcutaneous Nightly   minoxidil 5 mg Oral BID   nicotine 1 patch Transdermal Q24H   nystatin  Topical Q12H   sertraline 100 mg Oral Daily   sodium bicarbonate 1,300 mg Oral TID   tamsulosin 0.8 mg Oral Daily   vancomycin 750 mg Intravenous Q24H       Pharmacy to dose vancomycin        Medication Review:   Current Facility-Administered Medications   Medication Dose Route Frequency Provider Last Rate Last Dose   • amLODIPine (NORVASC) tablet 5 mg  5 mg Oral BID Sharron Mcneill MD   5 mg at 07/23/17 1808   • apixaban (ELIQUIS) tablet 5 mg  5 mg Oral Q12H Mark Hawthorne MD   5 mg at 07/23/17 2117   • aspirin EC tablet 81 mg  81 mg Oral Daily Mark Hawthorne MD   81 mg at 07/23/17 0838   • atorvastatin (LIPITOR) tablet 40 mg  40 mg Oral Daily Mark Hawthorne MD   40 mg at 07/23/17 0838   • carvedilol (COREG) tablet 3.125 mg  3.125 mg Oral Q12H Romeo Agee MD   3.125 mg at 07/23/17 2117   • cefepime (MAXIPIME) 2 g/100 mL 0.9% NS (mbp)  2 g Intravenous Q24H Eugenia Jung MD   2 g at 07/23/17 1324   • cholecalciferol (VITAMIN D3) tablet 5,000 Units  5,000 Units Oral Daily Heidi Medel MD   5,000 Units at 07/23/17 0837   • dextrose (D50W) solution 25 g  25 g Intravenous Q15 Min PRN Mark Hawthorne MD   25 g at 07/20/17 1418   • dextrose (GLUTOSE) oral gel 15 g  15 g Oral Q15 Min PRN Mark Hawthorne MD       • famotidine (PEPCID) injection 20 mg  20 mg Intravenous Daily Mark Hawthorne MD        Or   • famotidine (PEPCID) tablet 20 mg  20 mg Oral Daily Mark Hawthorne MD   20 mg at 07/23/17 1029   • glucagon (human recombinant) (GLUCAGEN DIAGNOSTIC) injection 1 mg  1 mg Subcutaneous Q15  Min PRN Mark Hawthorne MD       • insulin aspart (novoLOG) injection 0-9 Units  0-9 Units Subcutaneous 4x Daily With Meals & Nightly Mark Hawthorne MD   4 Units at 07/23/17 1809   • insulin detemir (LEVEMIR) injection 18 Units  18 Units Subcutaneous Nightly Carlin Barry MD   18 Units at 07/23/17 2118   • minoxidil (LONITEN) tablet 5 mg  5 mg Oral BID Chris Kelly MD   5 mg at 07/23/17 1809   • morphine injection 4 mg  4 mg Intravenous Q2H PRN Leticia Stanton MD   4 mg at 07/15/17 1235   • naloxone (NARCAN) injection 0.4 mg  0.4 mg Intravenous Q5 Min PRN Leticia Stanton MD   0.4 mg at 07/19/17 0042   • nicotine (NICODERM CQ) 14 MG/24HR patch 1 patch  1 patch Transdermal Q24H Leticia Stanton MD   1 patch at 07/23/17 2120   • nitroglycerin (NITROSTAT) SL tablet 0.4 mg  0.4 mg Sublingual Q5 Min PRN Mark Hawthorne MD       • nystatin (MYCOSTATIN) powder   Topical Q12H Carlin Barry MD       • ondansetron (ZOFRAN) tablet 4 mg  4 mg Oral Q6H PRN Mark Hawthorne MD        Or   • ondansetron ODT (ZOFRAN-ODT) disintegrating tablet 4 mg  4 mg Oral Q6H PRN Mark Hawthorne MD        Or   • ondansetron (ZOFRAN) injection 4 mg  4 mg Intravenous Q6H PRN Mark Hawthorne MD   4 mg at 07/16/17 1048   • oxyCODONE-acetaminophen (PERCOCET)  MG per tablet 2 tablet  2 tablet Oral Q6H PRN Mark Hawthorne MD   2 tablet at 07/20/17 0157   • oxyCODONE-acetaminophen (PERCOCET) 5-325 MG per tablet 1 tablet  1 tablet Oral Q4H PRN Mark Hawthorne MD   1 tablet at 07/20/17 2140   • Pharmacy to dose vancomycin   Does not apply Continuous PRN Mark Hawthorne MD       • sennosides-docusate sodium (SENOKOT-S) 8.6-50 MG tablet 2 tablet  2 tablet Oral BID PRN Mark Hawthorne MD       • sertraline (ZOLOFT) tablet 100 mg  100 mg Oral Daily Mark Hawthorne MD   100 mg at 07/23/17 0838   • sodium bicarbonate tablet 1,300 mg  1,300 mg Oral TID Mikey Loera  MD Vince   1,300 mg at 07/23/17 2117   • tamsulosin (FLOMAX) 24 hr capsule 0.8 mg  0.8 mg Oral Daily Heidi Medel MD   0.8 mg at 07/23/17 0849   • vancomycin 750 mg/250 mL 0.9% NS add-vantage  750 mg Intravenous Q24H Mark Hawthorne MD   750 mg at 07/23/17 1442       Assessment/Plan   1.  Acute kidney injury on chronic kidney disease, the he has questionable urinary retention, he had the previous fistula surgery with hypotension related to poor oral intake also sepsis syndrome and compromise or total regulation from the ACE inhibitor.  Creatinine is Down to 2.62 improving could be related to a component of urinary retention  2.  Urinary retention, Garcia catheter was anchored in place  3.  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  4.  ABLA, his hemoglobin today is 9.5  5.  Peripheral vascular disease and carotid disease  6.  Hypotension resolved  7.  Metabolic acidosis, treated with sodium bicarbonate      Plan:  1.  We'll continue the same treatment, Garcia catheter will be removed today  2.  Surveillance labs          Hiro Roberson MD  07/24/17  9:08 AM

## 2017-07-24 NOTE — PLAN OF CARE
Problem: Patient Care Overview (Adult)  Goal: Plan of Care Review  Outcome: Ongoing (interventions implemented as appropriate)    07/24/17 5600   Coping/Psychosocial Response Interventions   Plan Of Care Reviewed With patient   Patient Care Overview   Progress improving   Outcome Evaluation   Outcome Summary/Follow up Plan Pt increasing with strength and balance when sitting EOB doing ther ex

## 2017-07-24 NOTE — PLAN OF CARE
Problem: Fall Risk (Adult)  Goal: Absence of Falls  Outcome: Ongoing (interventions implemented as appropriate)    Problem: Pain, Acute (Adult)  Goal: Acceptable Pain Control/Comfort Level  Outcome: Ongoing (interventions implemented as appropriate)    Problem: Infection, Risk/Actual (Adult)  Goal: Infection Prevention/Resolution  Outcome: Ongoing (interventions implemented as appropriate)

## 2017-07-24 NOTE — PROGRESS NOTES
Name: Jay Blackwood ADMIT: 2017   : 1960  PCP: JOELLE Pak    MRN: [id number] LOS: 10 days   AGE/SEX: 57 y.o. male  ROOM: [facility]   Subjective   Subjective  Reports doing well. No CP SOA NVD. Tolerating PO. PICC flushed well this morning and no drainage from surgical wound reported. Pain well controlled.    Objective   Vital Signs  Temp:  [97 °F (36.1 °C)-98.9 °F (37.2 °C)] 98.2 °F (36.8 °C)  Heart Rate:  [56-71] 58  Resp:  [16-18] 18  BP: (135-182)/(43-81) 154/73  SpO2:  [94 %-98 %] 97 %  on   ;   O2 Device: room air  Body mass index is 27.46 kg/(m^2).    Physical Exam   Constitutional: He is oriented to person, place, and time. He appears well-developed and well-nourished. No distress.   HENT:   Head: Normocephalic and atraumatic.   Eyes: EOM are normal. Pupils are equal, round, and reactive to light.   Neck: Normal range of motion. Neck supple.   Cardiovascular: Normal rate, regular rhythm, normal heart sounds and intact distal pulses.    No murmur heard.  Pulmonary/Chest: Effort normal and breath sounds normal. He has no wheezes.   Abdominal: Soft. Bowel sounds are normal. He exhibits no distension. There is no tenderness.   Musculoskeletal: Normal range of motion. He exhibits deformity ( L AKA, surgical wound with staples healing well). He exhibits no edema.   Neurological: He is alert and oriented to person, place, and time.   Skin: Skin is warm and dry. He is not diaphoretic.   Psychiatric: He has a normal mood and affect. His behavior is normal.   Nursing note and vitals reviewed.      Results Review:       I reviewed the patient's new clinical results.    Results from last 7 days  Lab Units 17  0441 17  0527 17  0620 17  0542 17  0603 17  2350 17  0452   WBC 10*3/mm3 8.97 10.07 10.74* 12.16* 12.33* 12.02* 7.75   HEMOGLOBIN g/dL 9.5* 7.5* 8.1* 8.3* 9.2* 9.4* 7.7*   PLATELETS 10*3/mm3 166 146 154 141 150 147 139*     Results from last 7  days  Lab Units 07/24/17  0441 07/23/17  2305 07/23/17  0527 07/22/17  0516 07/21/17  0620 07/20/17  0542 07/19/17  0603 07/18/17  0452   SODIUM mmol/L 145  --  148* 147* 143 140 140 137   POTASSIUM mmol/L 3.7 3.5 3.1* 3.2* 3.7 4.1 4.3 4.1   CHLORIDE mmol/L 114*  --  114* 115* 112* 108* 110* 107   CO2 mmol/L 18.3*  --  19.4* 19.1* 17.1* 17.1* 13.9* 14.1*   BUN mg/dL 24*  --  29* 34* 37* 42* 41* 42*   CREATININE mg/dL 2.62*  --  2.74* 3.28* 3.46* 3.77* 4.28* 4.23*   GLUCOSE mg/dL 117*  --  103* 82 166* 165* 113* 83   Estimated Creatinine Clearance: 42.7 mL/min (by C-G formula based on Cr of 2.62).  Results from last 7 days  Lab Units 07/24/17  0441 07/23/17  0527 07/22/17  0516 07/21/17  0620 07/20/17  0542 07/19/17  0603 07/18/17  0452   CALCIUM mg/dL 8.0* 7.5* 8.2* 7.8* 7.9* 7.6* 7.7*   ALBUMIN g/dL 2.00* 2.10* 2.10* 2.10*  --  2.20* 2.50*   MAGNESIUM mg/dL 2.0 1.4*  --  1.7  --   --  1.9   PHOSPHORUS mg/dL 2.3* 2.0* 3.1 4.8*  --   --  5.4*         amLODIPine 5 mg Oral BID   apixaban 5 mg Oral Q12H   aspirin 81 mg Oral Daily   atorvastatin 40 mg Oral Daily   carvedilol 3.125 mg Oral Q12H   cefepime 2 g Intravenous Q24H   cholecalciferol 5,000 Units Oral Daily   famotidine 20 mg Intravenous Daily   Or      famotidine 20 mg Oral Daily   insulin aspart 0-9 Units Subcutaneous 4x Daily With Meals & Nightly   insulin detemir 18 Units Subcutaneous Nightly   minoxidil 5 mg Oral BID   nicotine 1 patch Transdermal Q24H   nystatin  Topical Q12H   sertraline 100 mg Oral Daily   sodium bicarbonate 1,300 mg Oral TID   tamsulosin 0.8 mg Oral Daily   vancomycin 750 mg Intravenous Q24H       Pharmacy to dose vancomycin    Diet Dysphagia; IV - Mechanical Soft No Mixed Consistencies; Nectar / Syrup Thick      Assessment/Plan   Assessment:     Active Hospital Problems (** Indicates Principal Problem)    Diagnosis Date Noted   • **Chronic osteomyelitis of left femur [M86.652] 07/14/2017   • Diabetes mellitus type 2, insulin dependent  [E11.9, Z79.4] 07/21/2017   • Chronic osteomyelitis [M86.60] 07/14/2017   • A-fib [I48.91] 10/26/2016   • PVD (peripheral vascular disease) [I73.9] 10/26/2016      Resolved Hospital Problems    Diagnosis Date Noted Date Resolved   No resolved problems to display.       Plan:   - DM2: A1c 6.3. Continue Levemir and SSI. Resume home regimen at dispo.  - HTN: Amlodipine, Coreg. Lisinopril held re KRISTA.  - KRISTA on CKD 3-4: Nephrology following. Possible ATN. Improving renal function.  - PAF: Cardiology following. Rate controlled and AC with eliquis  - ABLA: Transfusion per primary. Responded well  - Sepsis/PNA/Chronic Osteomyelitis L Femur: SP L AKA revision 07/14/17. Cefepime/Vancomycin. MRSA wound Cx, Sputum only growing candida, BCx NG. ID and Pulmonology following. Vascular primary.    Will continue to follow. Please call with questions or concerns.    Chris Gutierrez MD  [facility]  07/24/17  11:07 AM
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
## 2017-07-25 VITALS
OXYGEN SATURATION: 91 % | BODY MASS INDEX: 27.45 KG/M2 | SYSTOLIC BLOOD PRESSURE: 154 MMHG | HEART RATE: 73 BPM | WEIGHT: 213.9 LBS | RESPIRATION RATE: 16 BRPM | DIASTOLIC BLOOD PRESSURE: 61 MMHG | HEIGHT: 74 IN | TEMPERATURE: 96.3 F

## 2017-07-25 PROBLEM — J18.9 PNEUMONIA OF LEFT LOWER LOBE DUE TO INFECTIOUS ORGANISM: Status: RESOLVED | Noted: 2017-07-17 | Resolved: 2017-07-25

## 2017-07-25 LAB
ALBUMIN SERPL-MCNC: 2.1 G/DL (ref 3.5–5.2)
ANION GAP SERPL CALCULATED.3IONS-SCNC: 12.6 MMOL/L
BASOPHILS # BLD AUTO: 0.02 10*3/MM3 (ref 0–0.2)
BASOPHILS NFR BLD AUTO: 0.2 % (ref 0–1.5)
BUN BLD-MCNC: 26 MG/DL (ref 6–20)
BUN/CREAT SERPL: 9.6 (ref 7–25)
CALCIUM SPEC-SCNC: 7.4 MG/DL (ref 8.6–10.5)
CHLORIDE SERPL-SCNC: 111 MMOL/L (ref 98–107)
CO2 SERPL-SCNC: 22.4 MMOL/L (ref 22–29)
CREAT BLD-MCNC: 2.71 MG/DL (ref 0.76–1.27)
DEPRECATED RDW RBC AUTO: 58 FL (ref 37–54)
EOSINOPHIL # BLD AUTO: 0.41 10*3/MM3 (ref 0–0.7)
EOSINOPHIL NFR BLD AUTO: 4.4 % (ref 0.3–6.2)
ERYTHROCYTE [DISTWIDTH] IN BLOOD BY AUTOMATED COUNT: 17.7 % (ref 11.5–14.5)
GFR SERPL CREATININE-BSD FRML MDRD: 24 ML/MIN/1.73
GLUCOSE BLD-MCNC: 78 MG/DL (ref 65–99)
GLUCOSE BLDC GLUCOMTR-MCNC: 183 MG/DL (ref 70–130)
GLUCOSE BLDC GLUCOMTR-MCNC: 249 MG/DL (ref 70–130)
GLUCOSE BLDC GLUCOMTR-MCNC: 86 MG/DL (ref 70–130)
HCT VFR BLD AUTO: 27.1 % (ref 40.4–52.2)
HGB BLD-MCNC: 9.1 G/DL (ref 13.7–17.6)
IMM GRANULOCYTES # BLD: 0.21 10*3/MM3 (ref 0–0.03)
IMM GRANULOCYTES NFR BLD: 2.2 % (ref 0–0.5)
LYMPHOCYTES # BLD AUTO: 0.84 10*3/MM3 (ref 0.9–4.8)
LYMPHOCYTES NFR BLD AUTO: 8.9 % (ref 19.6–45.3)
MAGNESIUM SERPL-MCNC: 1.8 MG/DL (ref 1.6–2.6)
MCH RBC QN AUTO: 29.9 PG (ref 27–32.7)
MCHC RBC AUTO-ENTMCNC: 33.6 G/DL (ref 32.6–36.4)
MCV RBC AUTO: 89.1 FL (ref 79.8–96.2)
MONOCYTES # BLD AUTO: 0.77 10*3/MM3 (ref 0.2–1.2)
MONOCYTES NFR BLD AUTO: 8.2 % (ref 5–12)
NEUTROPHILS # BLD AUTO: 7.16 10*3/MM3 (ref 1.9–8.1)
NEUTROPHILS NFR BLD AUTO: 76.1 % (ref 42.7–76)
PHOSPHATE SERPL-MCNC: 2.8 MG/DL (ref 2.5–4.5)
PLATELET # BLD AUTO: 169 10*3/MM3 (ref 140–500)
PMV BLD AUTO: 10.3 FL (ref 6–12)
POTASSIUM BLD-SCNC: 3.4 MMOL/L (ref 3.5–5.2)
RBC # BLD AUTO: 3.04 10*6/MM3 (ref 4.6–6)
SODIUM BLD-SCNC: 146 MMOL/L (ref 136–145)
URATE SERPL-MCNC: 7 MG/DL (ref 3.4–7)
VANCOMYCIN TROUGH SERPL-MCNC: 16.4 MCG/ML (ref 5–20)
WBC NRBC COR # BLD: 9.41 10*3/MM3 (ref 4.5–10.7)

## 2017-07-25 PROCEDURE — 84550 ASSAY OF BLOOD/URIC ACID: CPT | Performed by: INTERNAL MEDICINE

## 2017-07-25 PROCEDURE — 85025 COMPLETE CBC W/AUTO DIFF WBC: CPT | Performed by: SURGERY

## 2017-07-25 PROCEDURE — 82962 GLUCOSE BLOOD TEST: CPT

## 2017-07-25 PROCEDURE — 80202 ASSAY OF VANCOMYCIN: CPT | Performed by: INTERNAL MEDICINE

## 2017-07-25 PROCEDURE — 97110 THERAPEUTIC EXERCISES: CPT

## 2017-07-25 PROCEDURE — 80069 RENAL FUNCTION PANEL: CPT | Performed by: INTERNAL MEDICINE

## 2017-07-25 PROCEDURE — 83735 ASSAY OF MAGNESIUM: CPT | Performed by: INTERNAL MEDICINE

## 2017-07-25 PROCEDURE — 25010000002 CEFEPIME: Performed by: INTERNAL MEDICINE

## 2017-07-25 RX ORDER — POTASSIUM CHLORIDE 750 MG/1
40 CAPSULE, EXTENDED RELEASE ORAL ONCE
Status: COMPLETED | OUTPATIENT
Start: 2017-07-25 | End: 2017-07-25

## 2017-07-25 RX ORDER — MINOXIDIL 2.5 MG/1
7.5 TABLET ORAL 2 TIMES DAILY
Qty: 180 TABLET | Refills: 0 | Status: SHIPPED | OUTPATIENT
Start: 2017-07-25

## 2017-07-25 RX ORDER — OXYCODONE AND ACETAMINOPHEN 10; 325 MG/1; MG/1
1 TABLET ORAL EVERY 6 HOURS PRN
Qty: 30 TABLET | Refills: 0 | Status: SHIPPED | OUTPATIENT
Start: 2017-07-25

## 2017-07-25 RX ORDER — OXYCODONE AND ACETAMINOPHEN 10; 325 MG/1; MG/1
2 TABLET ORAL EVERY 6 HOURS PRN
Qty: 30 TABLET | Refills: 0 | Status: SHIPPED | OUTPATIENT
Start: 2017-07-25 | End: 2018-07-18

## 2017-07-25 RX ORDER — MINOXIDIL 2.5 MG/1
7.5 TABLET ORAL 2 TIMES DAILY
Status: DISCONTINUED | OUTPATIENT
Start: 2017-07-25 | End: 2017-07-25 | Stop reason: HOSPADM

## 2017-07-25 RX ADMIN — MINOXIDIL 5 MG: 2.5 TABLET ORAL at 08:44

## 2017-07-25 RX ADMIN — CARVEDILOL 3.12 MG: 3.12 TABLET, FILM COATED ORAL at 08:44

## 2017-07-25 RX ADMIN — FAMOTIDINE 20 MG: 20 TABLET, FILM COATED ORAL at 08:44

## 2017-07-25 RX ADMIN — CEFEPIME 2 G: 2 INJECTION, POWDER, FOR SOLUTION INTRAVENOUS at 14:16

## 2017-07-25 RX ADMIN — NYSTATIN: 100000 POWDER TOPICAL at 08:47

## 2017-07-25 RX ADMIN — TAMSULOSIN HYDROCHLORIDE 0.8 MG: 0.4 CAPSULE ORAL at 08:44

## 2017-07-25 RX ADMIN — ATORVASTATIN CALCIUM 40 MG: 20 TABLET, FILM COATED ORAL at 08:44

## 2017-07-25 RX ADMIN — AMLODIPINE BESYLATE 5 MG: 5 TABLET ORAL at 08:44

## 2017-07-25 RX ADMIN — SERTRALINE 100 MG: 100 TABLET, FILM COATED ORAL at 08:44

## 2017-07-25 RX ADMIN — VITAMIN D, TAB 1000IU (100/BT) 5000 UNITS: 25 TAB at 08:44

## 2017-07-25 RX ADMIN — APIXABAN 5 MG: 5 TABLET, FILM COATED ORAL at 08:44

## 2017-07-25 RX ADMIN — POTASSIUM CHLORIDE 40 MEQ: 750 CAPSULE, EXTENDED RELEASE ORAL at 17:23

## 2017-07-25 RX ADMIN — SODIUM BICARBONATE 1300 MG: 650 TABLET ORAL at 08:44

## 2017-07-25 RX ADMIN — VANCOMYCIN HYDROCHLORIDE 750 MG: 750 INJECTION, POWDER, LYOPHILIZED, FOR SOLUTION INTRAVENOUS at 15:44

## 2017-07-25 RX ADMIN — ASPIRIN 81 MG: 81 TABLET ORAL at 08:44

## 2017-07-25 RX ADMIN — SODIUM BICARBONATE 1300 MG: 650 TABLET ORAL at 17:23

## 2017-07-25 NOTE — THERAPY TREATMENT NOTE
Acute Care - Physical Therapy Treatment Note  [facility]     Patient Name: Jay Blackwood  : 1960  MRN: [id number]  Today's Date: 2017  Onset of Illness/Injury or Date of Surgery Date: 17     Referring Physician: Christoph Rivers Date: 2017    Visit Dx:    ICD-10-CM ICD-9-CM   1. Decreased mobility R26.89 781.99   2. Chronic osteomyelitis M86.60 730.10     Patient Active Problem List   Diagnosis   • OA (osteoarthritis) of knee   • Diabetes mellitus   • Hypertension   • A-fib   • Neuropathic pain   • PVD (peripheral vascular disease)   • HLD (hyperlipidemia)   • Chronic osteomyelitis of left femur   • Chronic osteomyelitis   • Diabetes mellitus type 2, insulin dependent               Adult Rehabilitation Note       17 1502 17 1600 17 1338    Rehab Assessment/Intervention    Discipline physical therapy assistant  -SHARI physical therapy assistant  -CW physical therapist  -AR    Document Type therapy note (daily note)  -SHARI therapy note (daily note)  -CW therapy note (daily note)  -AR    Subjective Information agree to therapy  -SHARI agree to therapy;complains of;weakness;fatigue;pain  -CW agree to therapy  -AR    Patient Effort, Rehab Treatment good  -SHARI adequate  -CW good  -AR    Precautions/Limitations fall precautions  -SHARI fall precautions  -CW fall precautions  -AR    Recorded by [SHARI] Sai Stock PTA [CW] Misha Tran [AR] Mariangel Osei PT    Vital Signs    O2 Delivery Pre Treatment  room air  -CW     Recorded by  [CW] Misha Tran     Pain Assessment    Pain Assessment No/denies pain  -SHARI 0-10  -CW 0-10  -AR    Pain Score  6  -CW 5  -AR    Pain Location  Generalized  -CW     Pain Intervention(s)  Repositioned;Ambulation/increased activity  -CW     Recorded by [SHARI] Sai Stock PTA [CW] Misha Tran [AR] Mariangel Osei PT    Cognitive Assessment/Intervention    Current Cognitive/Communication Assessment functional  -SHARI functional  -CW functional  -AR     Orientation Status oriented x 4  -SHARI oriented x 4  -CW     Follows Commands/Answers Questions 100% of the time  -SHARI 100% of the time  -CW     Personal Safety WNL/WFL  -SHARI mild impairment;decreased awareness, need for safety;decreased insight to deficits  -CW     Personal Safety Interventions fall prevention program maintained;gait belt;nonskid shoes/slippers when out of bed  -SHARI fall prevention program maintained;gait belt;muscle strengthening facilitated;nonskid shoes/slippers when out of bed  -CW     Recorded by [SHARI] Sai Stock PTA [CW] Misha Tran [AR] Mariangel Osei PT    Bed Mobility, Assessment/Treatment    Bed Mobility, Assistive Device   bed rails;head of bed elevated  -AR    Bed Mobility, Scoot/Bridge, [place] supervision required  -SHARI supervision required  -CW minimum assist (75% patient effort)  -AR    Bed Mob, Supine to Sit, [place] supervision required  -SHARI supervision required  -CW not tested  -AR    Bed Mobility, Safety Issues decreased use of arms for pushing/pulling;decreased use of legs for bridging/pushing  -SHARI      Bed Mobility, Impairments strength decreased  -SHARI      Recorded by [SHARI] Sai Stock PTA [CW] Misha Tran [AR] Mariangel Osei, PT    Transfer Assessment/Treatment    Transfers, Bed-Chair [place] supervision required  -SHARI      Transfers, Chair-Bed [place] supervision required  -SHARI      Transfers, Sit-Stand [place] supervision required  -SHARI moderate assist (50% patient effort)  -CW moderate assist (50% patient effort)  -AR    Transfers, Stand-Sit [place] supervision required  -SHARI minimum assist (75% patient effort)  -CW minimum assist (75% patient effort)  -AR    Transfers, Sit-Stand-Sit, Assist Device rolling walker  -SHARI rolling walker  -CW rolling walker  -AR    Transfer, Safety Issues step length decreased  -SHARI      Transfer, Impairments strength decreased;impaired balance  -SHARI      Recorded by [SHARI] Sai Stock PTA [CW] Misha HUTCHINSON  [place] [AR] Mariangel Osei, PT    Gait Assessment/Treatment    Gait, [place] Level unable to perform;not tested  -SHARI not tested  -CW     Gait, Comment   1 pivot towards HOB  -AR    Recorded by [SHARI] Sai Stock PTA [CW] Misha Tran [AR] Mariangel Osei PT    Therapy Exercises    Right Lower Extremity AROM:;10 reps;ankle pumps/circles;LAQ  -SHARI AROM:;10 reps;supine;heel slides;hip abduction/adduction;ankle pumps/circles  -CW     Recorded by [SHARI] Sai Stock PTA [CW] Misha Tran     Positioning and Restraints    Pre-Treatment Position sitting in chair/recliner  -SHARI in bed  -CW in bed  -AR    Post Treatment Position chair  -SHARI bed  -CW bed  -AR    In Bed  notified nsg;supine;call light within reach;encouraged to call for assist  -CW sitting EOB;call light within reach;encouraged to call for assist  -AR    In Chair sitting;call light within reach;encouraged to call for assist;notified nsg  -SHARI      Recorded by [SHARI] Sai Stock PTA [CW] Misha Tran [AR] Mariangel Osei PT      User Key  (r) = Recorded By, (t) = Taken By, (c) = Cosigned By    Initials Name Effective Dates    AR Mariangel Osei, PT 06/27/16 -     SHARI Stock PTA 04/24/15 -     ANNETTE Tran 12/13/16 -                 IP PT Goals       07/25/17 0938 07/17/17 1310       Transfer Training PT LTG    Transfer Training PT LTG, Date Established 07/25/17  -KH 07/17/17  -ARMANI (r) GERA (t) ARMANI (c)     Transfer Training PT LTG, Time to Achieve 5 - 7 days  -KH 1 wk  -ARMANI (r) GERA (t) ARMANI (c)     Transfer Training PT LTG, Activity Type all transfers  -KH all transfers  -KH (r) GERA (t) KH (c)     Transfer Training PT LTG, [place] Level contact guard assist  -KH contact guard assist  -KH (r) GERA (t) KH (c)     Transfer Training PT LTG, Assist Device  --   Appropriate AD  -ARMANI (r) GERA (t) KH (c)     Gait Training PT LTG    Gait Training Goal PT LTG, Date Established 07/25/17  -KH 07/17/17  -ARMANI (r) GERA (t) ARMANI (c)     Gait Training Goal  PT LTG, Time to Achieve 5 - 7 days  -KH 1 wk  -KH (r) GERA (t) KH (c)     Gait Training Goal PT LTG, [place] Level  contact guard assist;2 person assist required  -KH (r) GERA (t) KH (c)     Gait Training Goal PT LTG, Assist Device  --   Appropriate AD  -KH (r) GERA (t) KH (c)     Gait Training Goal PT LTG, Distance to Achieve  20  -KH (r) GERA (t) KH (c)       User Key  (r) = Recorded By, (t) = Taken By, (c) = Cosigned By    Initials Name Provider Type    KH Salma Sifuentes, PT Physical Therapist    GERA Flowers, PT Student PT Student          Physical Therapy Education     Title: PT OT SLP Therapies (Done)     Topic: Physical Therapy (Done)     Point: Mobility training (Done)    Learning Progress Summary    Learner Readiness Method Response Comment Documented by Status   Patient Acceptance E VU,NR   07/25/17 1509 Done    [facility] 164[facility] Done    Acceptance E VU  AR 07/23/17 1340 Done    Acceptance E VU,NR   07/20/17 0958 Done    Acceptance E VU,NR   07/18/17 0935 Done    Acceptance E VU,[facility] 07/17/17 1310 Done               Point: Home exercise program (Done)    Learning Progress Summary    Learner Readiness Method Response Comment Documented by Status   Patient Acceptance E VU,NR   07/25/17 1509 Done    Acceptance E,TB,D VU,[facility] 07/24/17 1642 Done    Acceptance E VU  AR 07/23/17 1340 Done    Acceptance E VU,NR   07/20/17 0958 Done    Acceptance E VU,NR   07/18/17 0935 Done    Acceptance E VU,NR   07/17/17 1310 Done               Point: Body mechanics (Done)    Learning Progress Summary    Learner Readiness Method Response Comment Documented by Status   Patient Acceptance E VU,NR   07/25/17 1509 Done    Acceptance E,TB,D VU,[facility] 07/24/17 1642 Done    Acceptance E VU  AR 07/23/17 1340 Done    Acceptance E VU,NR   07/20/17 0958 Done    Acceptance E VU,NR   07/18/17 0935 Done    Acceptance E VU,[facility] 07/17/17 1310 Done               Point: Precautions (Done)     Learning Progress Summary    Learner Readiness Method Response Comment Documented by Status   Patient Acceptance E VU,NR  SHARI 07/25/17 1509 Done    Acceptance E,TB,D VU,DU   07/24/17 1642 Done    Acceptance E VU  AR 07/23/17 1340 Done    Acceptance E NR  JW 07/21/17 1127 Active    Acceptance E VU,NR  SHARI 07/20/17 0958 Done    Acceptance E VU,NR  SHARI 07/18/17 0935 Done    Acceptance E VU,NR  GERA 07/17/17 1310 Done                      User Key     Initials Effective Dates Name Provider Type Discipline     02/18/16 -  Kelli Rodriguez, PTA Physical Therapy Assistant PT    AR 06/27/16 -  Mariangel Osei, PT Physical Therapist PT    SHARI 04/24/15 -  Sai Stock PTA Physical Therapy Assistant PT     12/13/16 -  Misha Tran Physical Therapy Assistant PT     05/08/17 -  Reggie Flowers, PT Student PT Student PT                    PT Recommendation and Plan  Anticipated Equipment Needs At Discharge: walker  Anticipated Discharge Disposition: inpatient rehabilitation facility, home with home health  Planned Therapy Interventions: balance training, gait training, home exercise program, patient/family education, strengthening, transfer training  PT Frequency: daily  Plan of Care Review  Plan Of Care Reviewed With: patient  Outcome Summary/Follow up Plan: Pt with improved functional indpendence as noted by sit<>stand and stand pivot t'fers.  Pt reports that primary mode of locamotion will be PWC upon returning home.            Outcome Measures       07/25/17 1500 07/24/17 1600 07/23/17 1300    How much help from another person do you currently need...    Turning from your back to your side while in flat bed without using bedrails? 4  -SHARI 4  -CW 4  -AR    Moving from lying on back to sitting on the side of a flat bed without bedrails? 4  -SHARI 3  -CW 3  -AR    Moving to and from a bed to a chair (including a wheelchair)? 3  -SHARI 2  -CW 2  -AR    Standing up from a chair using your arms (e.g., wheelchair, bedside chair)?  3  -SHARI 2  -CW 2  -AR    Climbing 3-5 steps with a railing? 1  -SHARI 1  -CW 1  -AR    To walk in hospital room? 1  -SHARI 1  -CW 1  -AR    AM-PAC 6 Clicks Score 16  -SHARI 13  -CW 13  -AR    Functional Assessment    Outcome Measure Options AM-PAC 6 Clicks Basic Mobility (PT)  -SHARI AM-PAC 6 Clicks Basic Mobility (PT)  -CW       User Key  (r) = Recorded By, (t) = Taken By, (c) = Cosigned By    Initials Name Provider Type    CLARISSE Osei, PT Physical Therapist    SHARI Stock, QUINT[place] Physical Therapy Assistant    ANNETTE Tran Physical Therapy Assistant           Time Calculation:         PT Charges       07/25/17 1510 07/25/17 0938       Time Calculation    Start Time 1502  -SHARI      Stop Time 1511  -SHARI      Time Calculation (min) 9 min  -SHARI      PT Received On 07/25/17  -SHARI      PT - Next Appointment 07/26/17  -SHARI      PT Goal Re-Cert Due Date  07/31/17  -ARMANI       User Key  (r) = Recorded By, (t) = Taken By, (c) = Cosigned By    Initials Name Provider Type    ARMANI Sifuentes, PT Physical Therapist    SHARI Stock, [place] Physical Therapy Assistant          Therapy Charges for Today     Code Description Service Date Service Provider Modifiers Qty    [id number] HC PT THER PROC EA 15 MIN 7/25/2017 Sai Stock PTA GP 1          PT G-Codes  Outcome Measure Options: AM-PAC 6 Clicks Basic Mobility (PT)    Sai Stock PTA  7/25/2017

## 2017-07-25 NOTE — PROGRESS NOTES
[facility] INPATIENT PROGRESS NOTE         [address]    7/25/2017      PATIENT IDENTIFICATION:   Name:  Jay Blackwood      MRN:  [id number]     57 y.o.  male             LOS 11    Reason for visit: f/u PNA      SUBJECTIVE:    No specific complaints today.    Objective   OBJECTIVE:    Vital Sign Min/Max for last 24 hours  Temp  Min: 97.1 °F (36.2 °C)  Max: 98.3 °F (36.8 °C)   BP  Min: 144/59  Max: 178/61   Pulse  Min: 55  Max: 73   Resp  Min: 16  Max: 16   SpO2  Min: 91 %  Max: 94 %   No Data Recorded   No Data Recorded           Body mass index is 27.46 kg/(m^2).    Intake/Output Summary (Last 24 hours) at 07/25/17 1403  Last data filed at 07/25/17 1100   Gross per 24 hour   Intake              570 ml   Output             1225 ml   Net             -655 ml     \  Exam:  GEN:  No distress, appears stated age  EYES:   PERRL, anicteric sclera  ENT:    External ears/nose normal, OP clear  NECK:  No adenopathy, midline trachea  LUNGS: Normal chest on inspection, palpation and scattered course BS at bases on auscultation  CV:  Normal S1S2, without murmur  ABD:  Non tender, non distended, no hepatosplenomegaly, +BS  EXT:  No edema, cyanosis or clubbing    Scheduled meds:      amLODIPine 5 mg Oral BID   apixaban 5 mg Oral Q12H   aspirin 81 mg Oral Daily   atorvastatin 40 mg Oral Daily   carvedilol 3.125 mg Oral Q12H   cefepime 2 g Intravenous Q24H   cholecalciferol 5,000 Units Oral Daily   famotidine 20 mg Intravenous Daily   Or      famotidine 20 mg Oral Daily   insulin aspart 0-9 Units Subcutaneous 4x Daily With Meals & Nightly   insulin detemir 18 Units Subcutaneous Nightly   minoxidil 5 mg Oral BID   nicotine 1 patch Transdermal Q24H   nystatin  Topical Q12H   sertraline 100 mg Oral Daily   sodium bicarbonate 1,300 mg Oral TID   tamsulosin 0.8 mg Oral Daily   vancomycin 750 mg Intravenous Q24H     IV meds:                          Pharmacy to dose vancomycin      Data Review:    Results from last  7 days  Lab Units 07/25/17  0521 07/24/17  0441 07/23/17  2305 07/23/17  0527 07/22/17  0516 07/21/17  0620   SODIUM mmol/L 146* 145  --  148* 147* 143   POTASSIUM mmol/L 3.4* 3.7 3.5 3.1* 3.2* 3.7   CHLORIDE mmol/L 111* 114*  --  114* 115* 112*   CO2 mmol/L 22.4 18.3*  --  19.4* 19.1* 17.1*   BUN mg/dL 26* 24*  --  29* 34* 37*   CREATININE mg/dL 2.71* 2.62*  --  2.74* 3.28* 3.46*   GLUCOSE mg/dL 78 117*  --  103* 82 166*   CALCIUM mg/dL 7.4* 8.0*  --  7.5* 8.2* 7.8*         Estimated Creatinine Clearance: 41.3 mL/min (by C-G formula based on Cr of 2.71).    Results from last 7 days  Lab Units 07/25/17  0521 07/24/17 0441 07/23/17  0527 07/21/17  0620 07/20/17  0542   WBC 10*3/mm3 9.41 8.97 10.07 10.74* 12.16*   HEMOGLOBIN g/dL 9.1* 9.5* 7.5* 8.1* 8.3*   PLATELETS 10*3/mm3 169 166 146 154 141       Results from last 7 days  Lab Units 07/18/17  2350   INR  1.18*       Results from last 7 days  Lab Units 07/19/17  0603   ALT (SGPT) U/L 6   AST (SGOT) U/L 16       Results from last 7 days  Lab Units 07/21/17  1248 07/19/17  0705 07/19/17  0050   PH, ARTERIAL pH units 7.320* 7.240* 7.318*   PO2 ART mm Hg 75.5* 65.8* 48.3*   PCO2, ARTERIAL mm Hg 33.1* 34.6* 29.9*   HCO3 ART mmol/L 17.0* 14.8* 15.3*       Assessment   ASSESSMENT:   Bilateral lower lobe pneumonia/pneumonitis with signs of potential aspiration component  Acute hypoxemic respiratory failure secondary to pneumonia  NORY: Previously intolerant positive airway pressure  Metabolic acidosis with anion gap  Left stump osteomyelitis with abscess status post revision and left above-knee amputation 7/14  History MRSA  Diabetes mellitus type 2  Acute on chronic kidney disease  Tobacco abuse: Suspect component of COPD  Dysphagia: on dysphagia diet       PLAN:  Abx per ID  Aggressive pulmonate toilet  Ambulate  Encourage quit smoking.  Multiple medical issues noted  Pulmonary status stable  Okay to discharge from pulmonary point off view    Kiki Farah MD  Pulmonary  and Critical Care Medicine  [facility], [facility]  7/25/2017

## 2017-07-25 NOTE — PROGRESS NOTES
Continued Stay Note  [facility]     Patient Name: Jay Blackwood  MRN: [id number]  Today's Date: 7/25/2017    Admit Date: 7/14/2017          Discharge Plan       07/25/17 1713    Case Management/Social Work Plan    Plan Home with [facility]    Patient/Family In Agreement With Plan yes    Additional Comments Spoke with Dr. Medel who states she would like BMP drawn on Jul 31 and Aug 7 and faxed to [phone] attn: Pam Raphael.  Joan with [facility] notified.  Pt gets IV antibiotics from [facility].  Spoke with Celestine  with [facility] ([phone]) who states medication should be delivered tomorrow.  Joan with [facility] notified.  TONE Broussard RN              Discharge Codes     None        Expected Discharge Date and Time     Expected Discharge Date Expected Discharge Time    Jul 25, 2017             Sirisha Broussard
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
## 2017-07-25 NOTE — PROGRESS NOTES
"   LOS: 11 days    Patient Care Team:  JOELLE Pak as PCP - General (Family Medicine)  Prosper Oh MD as Consulting Physician (Cardiology)    Chief Complaint:  No chief complaint on file.      Subjective follow up Jonna on CKD3.        Interval History:   Wants to go smoke.  Anxious to go home.  Eating. Urinating without segal.  Bowels moving.   Some pain right shoulder and neck and left leg stump.  Sees pain management Dr. Vyas.  Has appointment on Tuesday.          Objective     Vital Signs  Temp:  [97.1 °F (36.2 °C)-98.3 °F (36.8 °C)] 97.6 °F (36.4 °C)  Heart Rate:  [55-73] 73  Resp:  [16] 16  BP: (144-178)/(48-67) 164/57    Flowsheet Rows         First Filed Value    Admission Height  74\" (188 cm) Documented at 07/14/2017 1002    Admission Weight  220 lb (99.8 kg) Documented at 07/14/2017 1002          I/O this shift:  In: 100 [IV Piggyback:100]  Out: 300 [Urine:300]  I/O last 3 completed shifts:  In: 1284.7 [P.O.:560; Blood:374.7; IV Piggyback:350]  Out: 1775 [Urine:1775]    Intake/Output Summary (Last 24 hours) at 07/25/17 1535  Last data filed at 07/25/17 1416   Gross per 24 hour   Intake              420 ml   Output             1225 ml   Net             -805 ml       Physical Exam:  General Appearance: alert, oriented x 3, no distress. morbidly obese. Sitting in chair.   Skin: warm and dry  Neck: supple, no JVD, trachea midline  Lungs:clear to auscultation   Heart: RRR, normal S1 and S2, no S3, no rub  Abdomen: soft, non-tender, +bs  Extremities: left AKA.  1+ RLE edema.   Neuro: normal speech and mental status      Results Review:      Results from last 7 days  Lab Units 07/25/17  0521 07/24/17  0441 07/23/17  2305 07/23/17  0527  07/19/17  0603   SODIUM mmol/L 146* 145  --  148*  < > 140   POTASSIUM mmol/L 3.4* 3.7 3.5 3.1*  < > 4.3   CHLORIDE mmol/L 111* 114*  --  114*  < > 110*   CO2 mmol/L 22.4 18.3*  --  19.4*  < > 13.9*   BUN mg/dL 26* 24*  --  29*  < > 41*   CREATININE mg/dL 2.71* " 2.62*  --  2.74*  < > 4.28*   CALCIUM mg/dL 7.4* 8.0*  --  7.5*  < > 7.6*   BILIRUBIN mg/dL  --   --   --   --   --  0.2   ALK PHOS U/L  --   --   --   --   --  77   ALT (SGPT) U/L  --   --   --   --   --  6   AST (SGOT) U/L  --   --   --   --   --  16   GLUCOSE mg/dL 78 117*  --  103*  < > 113*   < > = values in this interval not displayed.    Estimated Creatinine Clearance: 41.3 mL/min (by C-G formula based on Cr of 2.71).      Results from last 7 days  Lab Units 07/25/17  0521 07/24/17 0441 07/23/17  0527   MAGNESIUM mg/dL 1.8 2.0 1.4*   PHOSPHORUS mg/dL 2.8 2.3* 2.0*         Results from last 7 days  Lab Units 07/25/17  0521 07/21/17  0620   URIC ACID mg/dL 7.0 9.2*         Results from last 7 days  Lab Units 07/25/17  0521 07/24/17  0441 07/23/17  0527 07/21/17  0620 07/20/17  0542   WBC 10*3/mm3 9.41 8.97 10.07 10.74* 12.16*   HEMOGLOBIN g/dL 9.1* 9.5* 7.5* 8.1* 8.3*   PLATELETS 10*3/mm3 169 166 146 154 141         Results from last 7 days  Lab Units 07/18/17  2350   INR  1.18*         Imaging Results (last 24 hours)     ** No results found for the last 24 hours. **          amLODIPine 5 mg Oral BID   apixaban 5 mg Oral Q12H   aspirin 81 mg Oral Daily   atorvastatin 40 mg Oral Daily   carvedilol 3.125 mg Oral Q12H   cefepime 2 g Intravenous Q24H   cholecalciferol 5,000 Units Oral Daily   famotidine 20 mg Intravenous Daily   Or      famotidine 20 mg Oral Daily   insulin aspart 0-9 Units Subcutaneous 4x Daily With Meals & Nightly   insulin detemir 18 Units Subcutaneous Nightly   minoxidil 5 mg Oral BID   nicotine 1 patch Transdermal Q24H   nystatin  Topical Q12H   sertraline 100 mg Oral Daily   sodium bicarbonate 1,300 mg Oral TID   tamsulosin 0.8 mg Oral Daily   vancomycin 750 mg Intravenous Q24H       Pharmacy to dose vancomycin        Medication Review:   Current Facility-Administered Medications   Medication Dose Route Frequency Provider Last Rate Last Dose   • amLODIPine (NORVASC) tablet 5 mg  5 mg Oral  BID Sharron Mcneill MD   5 mg at 07/25/17 0844   • apixaban (ELIQUIS) tablet 5 mg  5 mg Oral Q12H Mark Hawthorne MD   5 mg at 07/25/17 0844   • aspirin EC tablet 81 mg  81 mg Oral Daily Mark Hawthorne MD   81 mg at 07/25/17 0844   • atorvastatin (LIPITOR) tablet 40 mg  40 mg Oral Daily Mark Hawthorne MD   40 mg at 07/25/17 0844   • carvedilol (COREG) tablet 3.125 mg  3.125 mg Oral Q12H Romeo Agee MD   3.125 mg at 07/25/17 0844   • cefepime (MAXIPIME) 2 g/100 mL 0.9% NS (mbp)  2 g Intravenous Q24H Eugenia Jung MD   2 g at 07/25/17 1416   • cholecalciferol (VITAMIN D3) tablet 5,000 Units  5,000 Units Oral Daily Heidi Medel MD   5,000 Units at 07/25/17 0844   • dextrose (D50W) solution 25 g  25 g Intravenous Q15 Min PRN Mark Hawthorne MD   25 g at 07/20/17 1418   • dextrose (GLUTOSE) oral gel 15 g  15 g Oral Q15 Min PRN Mark Hawthorne MD       • famotidine (PEPCID) injection 20 mg  20 mg Intravenous Daily Mark Hawthorne MD        Or   • famotidine (PEPCID) tablet 20 mg  20 mg Oral Daily Mark Hawthorne MD   20 mg at 07/25/17 0844   • glucagon (human recombinant) (GLUCAGEN DIAGNOSTIC) injection 1 mg  1 mg Subcutaneous Q15 Min PRN Mark Hawthorne MD       • insulin aspart (novoLOG) injection 0-9 Units  0-9 Units Subcutaneous 4x Daily With Meals & Nightly Mark Hawthorne MD   2 Units at 07/24/17 2154   • insulin detemir (LEVEMIR) injection 18 Units  18 Units Subcutaneous Nightly Carlin Barry MD   18 Units at 07/24/17 2156   • minoxidil (LONITEN) tablet 5 mg  5 mg Oral BID Chris Kelly MD   5 mg at 07/25/17 0844   • naloxone (NARCAN) injection 0.4 mg  0.4 mg Intravenous Q5 Min PRN Leticia Stanton MD   0.4 mg at 07/19/17 0042   • nicotine (NICODERM CQ) 14 MG/24HR patch 1 patch  1 patch Transdermal Q24H Leticia Stanton MD   1 patch at 07/24/17 0294   • nitroglycerin (NITROSTAT) SL tablet 0.4 mg  0.4 mg Sublingual Q5 Min PRN  Mark Hawthorne MD       • nystatin (MYCOSTATIN) powder   Topical Q12H Carlin Barry MD       • ondansetron (ZOFRAN) tablet 4 mg  4 mg Oral Q6H PRN Mark Hawthorne MD        Or   • ondansetron ODT (ZOFRAN-ODT) disintegrating tablet 4 mg  4 mg Oral Q6H PRN Mark Hawthorne MD        Or   • ondansetron (ZOFRAN) injection 4 mg  4 mg Intravenous Q6H PRN Mark Hawthorne MD   4 mg at 07/16/17 1048   • oxyCODONE-acetaminophen (PERCOCET)  MG per tablet 2 tablet  2 tablet Oral Q6H PRN Mark Hawthorne MD   2 tablet at 07/20/17 0157   • oxyCODONE-acetaminophen (PERCOCET) 5-325 MG per tablet 1 tablet  1 tablet Oral Q4H PRN Mark Hawthorne MD   1 tablet at 07/20/17 2140   • Pharmacy to dose vancomycin   Does not apply Continuous PRN Mark Hawthorne MD       • sennosides-docusate sodium (SENOKOT-S) 8.6-50 MG tablet 2 tablet  2 tablet Oral BID PRN Mark Hawthorne MD       • sertraline (ZOLOFT) tablet 100 mg  100 mg Oral Daily Mark Hawthorne MD   100 mg at 07/25/17 0844   • sodium bicarbonate tablet 1,300 mg  1,300 mg Oral TID Mikey Clarke MD   1,300 mg at 07/25/17 0844   • tamsulosin (FLOMAX) 24 hr capsule 0.8 mg  0.8 mg Oral Daily Heidi Medel MD   0.8 mg at 07/25/17 0844   • vancomycin 750 mg/250 mL 0.9% NS add-vantage  750 mg Intravenous Q24H Mark Hawthorne MD   750 mg at 07/24/17 1415       Assessment/Plan   1. KRISTA on CKD 3.  No change creatinine.  Plateau last 3 days.   2.  Urinary retention, Garcia catheter .  3.  Revision left AKA infected stump.  4.  ABLA, his hemoglobin today is 9.1.  5.  Peripheral arterial disease and carotid disease  6.  Hypertension. Not controlled. Increase minoxidil. Do not resume lisinopril at home.   7.  Metabolic acidosis, po bicarb.  AG 12.  8.  Bilateral LL PNA, possible aspiration.   9.   DM2    Plan:  1. Minoxidil 7.5 mg bid.   2. Will arrange follow up with JOELLE Raphael at our office. Office to call him  with Appt.       Then with Dr. Ozzy Padron Sept. 1st at 10 am.      [facility] to draw BMP July 31 and Aug 7 with results faxed to [phone].  3. Do not resume lisinopril , neurontin at home.       Heidi Medel MD  07/25/17  3:35 PM

## 2017-07-25 NOTE — PROGRESS NOTES
Continued Stay Note  [facility]     Patient Name: Jay Blackwood  MRN: [id number]  Today's Date: 7/25/2017    Admit Date: 7/14/2017          Discharge Plan       07/25/17 1255    Case Management/Social Work Plan    Plan Home with HH vs SNF    Patient/Family In Agreement With Plan yes    Additional Comments Spoke with patient at bedside who states he now does not want to go to rehab.  States he feels comfortable going home and his significant other can assist in his care.  [facility] still following for HH.  TONE Broussard RN              Discharge Codes     None            Sirisha Broussard

## 2017-07-25 NOTE — PROGRESS NOTES
Name: Jay Blackwood ADMIT: 2017   : 1960  PCP: JOELLE Pak    MRN: [id number] LOS: 11 days   AGE/SEX: 57 y.o. male  ROOM: [facility]   Subjective   Subjective  No CP SOA NVD or dysuria reported. Anxious to go home.    Objective   Vital Signs  Temp:  [97.1 °F (36.2 °C)-98.3 °F (36.8 °C)] 97.6 °F (36.4 °C)  Heart Rate:  [55-73] 73  Resp:  [16] 16  BP: (144-178)/(48-67) 164/57  SpO2:  [91 %-94 %] 91 %  on   ;   O2 Device: room air  Body mass index is 27.46 kg/(m^2).    Physical Exam   Constitutional: He is oriented to person, place, and time. He appears well-developed and well-nourished. No distress.   HENT:   Head: Normocephalic and atraumatic.   Eyes: EOM are normal. Pupils are equal, round, and reactive to light.   Neck: Normal range of motion. Neck supple.   Cardiovascular: Normal rate, regular rhythm, normal heart sounds and intact distal pulses.    No murmur heard.  Pulmonary/Chest: Effort normal and breath sounds normal. He has no wheezes.   Abdominal: Soft. Bowel sounds are normal. He exhibits no distension. There is no tenderness.   Musculoskeletal: Normal range of motion. He exhibits deformity ( L AKA, surgical wound with staples healing well). He exhibits no edema.   Neurological: He is alert and oriented to person, place, and time.   Skin: Skin is warm and dry. He is not diaphoretic.   Psychiatric: He has a normal mood and affect. His behavior is normal.   Nursing note and vitals reviewed.      Results Review:       I reviewed the patient's new clinical results.    Results from last 7 days  Lab Units 17  0521 17  0441 17  0527 17  0620 17  0542 17  0603 17  2350   WBC 10*3/mm3 9.41 8.97 10.07 10.74* 12.16* 12.33* 12.02*   HEMOGLOBIN g/dL 9.1* 9.5* 7.5* 8.1* 8.3* 9.2* 9.4*   PLATELETS 10*3/mm3 169 166 146 154 141 150 147     Results from last 7 days  Lab Units 17  0521 17  0441 17  2305 17  0527 17  0516  07/21/17  0620 07/20/17  0542 07/19/17  0603   SODIUM mmol/L 146* 145  --  148* 147* 143 140 140   POTASSIUM mmol/L 3.4* 3.7 3.5 3.1* 3.2* 3.7 4.1 4.3   CHLORIDE mmol/L 111* 114*  --  114* 115* 112* 108* 110*   CO2 mmol/L 22.4 18.3*  --  19.4* 19.1* 17.1* 17.1* 13.9*   BUN mg/dL 26* 24*  --  29* 34* 37* 42* 41*   CREATININE mg/dL 2.71* 2.62*  --  2.74* 3.28* 3.46* 3.77* 4.28*   GLUCOSE mg/dL 78 117*  --  103* 82 166* 165* 113*   Estimated Creatinine Clearance: 41.3 mL/min (by C-G formula based on Cr of 2.71).  Results from last 7 days  Lab Units 07/25/17  0521 07/24/17  0441 07/23/17  0527 07/22/17  0516 07/21/17  0620 07/20/17  0542 07/19/17  0603   CALCIUM mg/dL 7.4* 8.0* 7.5* 8.2* 7.8* 7.9* 7.6*   ALBUMIN g/dL 2.10* 2.00* 2.10* 2.10* 2.10*  --  2.20*   MAGNESIUM mg/dL 1.8 2.0 1.4*  --  1.7  --   --    PHOSPHORUS mg/dL 2.8 2.3* 2.0* 3.1 4.8*  --   --          amLODIPine 5 mg Oral BID   apixaban 5 mg Oral Q12H   aspirin 81 mg Oral Daily   atorvastatin 40 mg Oral Daily   carvedilol 3.125 mg Oral Q12H   cefepime 2 g Intravenous Q24H   cholecalciferol 5,000 Units Oral Daily   famotidine 20 mg Intravenous Daily   Or      famotidine 20 mg Oral Daily   insulin aspart 0-9 Units Subcutaneous 4x Daily With Meals & Nightly   insulin detemir 18 Units Subcutaneous Nightly   minoxidil 5 mg Oral BID   nicotine 1 patch Transdermal Q24H   nystatin  Topical Q12H   sertraline 100 mg Oral Daily   sodium bicarbonate 1,300 mg Oral TID   tamsulosin 0.8 mg Oral Daily   vancomycin 750 mg Intravenous Q24H       Pharmacy to dose vancomycin    Diet Regular; Thin; Consistent Carbohydrate      Assessment/Plan   Assessment:     Active Hospital Problems (** Indicates Principal Problem)    Diagnosis Date Noted   • **Chronic osteomyelitis of left femur [M86.652] 07/14/2017   • Diabetes mellitus type 2, insulin dependent [E11.9, Z79.4] 07/21/2017   • Chronic osteomyelitis [M86.60] 07/14/2017   • A-fib [I48.91] 10/26/2016   • PVD (peripheral  vascular disease) [I73.9] 10/26/2016      Resolved Hospital Problems    Diagnosis Date Noted Date Resolved   No resolved problems to display.       Plan:   - DM2: A1c 6.3. Resume home regimen on discharge.  - HTN: Continue to hold lisinopril pending renal approval. Continue amlodipine and coreg.  - KRISTA/CKD3-4: Cr stable at 2.6-7. Nephrology following.  - PAF: Rate controlled. AC with Eliquis. Cardiology following.  - ABLA: Hgb stable post transfusion.  Sepsis/PNA/Chronic Osteomyelitis L Femur: SP L AKA revision 07/14/17. Cefepime/Vancomycin. MRSA wound Cx, Sputum only growing candida, BCx NG. ID and Pulmonology following. Vascular primary.    Stable to discharge from my perspective. Renal to see today.    Chris Gutierrez MD  [facility]  07/25/17  3:14 PM

## 2017-07-25 NOTE — PROGRESS NOTES
Name: Jay Blackwood ADMIT: 2017   : 1960  PCP: JOELLE Pak    MRN: [id number] LOS: 11 days   AGE/SEX: 57 y.o. male  ROOM: [facility]     Subjective     HPI: 57 y.o. male status post revision of left AKA for chronic refractory ostomy mellitus.  On vancomycin.    Review of Systems    Objective     Vital Signs  Temp:  [97.1 °F (36.2 °C)-98.3 °F (36.8 °C)] 97.8 °F (36.6 °C)  Heart Rate:  [55-59] 56  Resp:  [16] 16  BP: (144-178)/(48-67) 178/61         Physical Exam  Vascular: Stump dry.  No drainage.  Staples in place.      Results from last 7 days  Lab Units 17  0521 17  04417  0527 17  0620 17  0542 17  0603 17  2350   WBC 10*3/mm3 9.41 8.97 10.07 10.74* 12.16* 12.33* 12.02*   HEMOGLOBIN g/dL 9.1* 9.5* 7.5* 8.1* 8.3* 9.2* 9.4*   PLATELETS 10*3/mm3 169 166 146 154 141 150 147     Results from last 7 days  Lab Units 17  0521 17  04417  2305 17  0527 17  0516 17  0620 17  0542 17  0603   SODIUM mmol/L 146* 145  --  148* 147* 143 140 140   POTASSIUM mmol/L 3.4* 3.7 3.5 3.1* 3.2* 3.7 4.1 4.3   CHLORIDE mmol/L 111* 114*  --  114* 115* 112* 108* 110*   CO2 mmol/L 22.4 18.3*  --  19.4* 19.1* 17.1* 17.1* 13.9*   BUN mg/dL 26* 24*  --  29* 34* 37* 42* 41*   CREATININE mg/dL 2.71* 2.62*  --  2.74* 3.28* 3.46* 3.77* 4.28*   GLUCOSE mg/dL 78 117*  --  103* 82 166* 165* 113*   Estimated Creatinine Clearance: 41.3 mL/min (by C-G formula based on Cr of 2.71).  Results from last 7 days  Lab Units 17  2350   PROTIME Seconds 14.6*   INR  1.18*       Billin, Post Op Global      Assessment/Plan     Active Hospital Problems (** Indicates Principal Problem)    Diagnosis Date Noted   • **Chronic osteomyelitis of left femur [M86.652] 2017   • Diabetes mellitus type 2, insulin dependent [E11.9, Z79.4] 2017   • Chronic osteomyelitis [M86.60] 2017   • A-fib [I48.91] 10/26/2016   • PVD (peripheral  vascular disease) [I73.9] 10/26/2016      Resolved Hospital Problems    Diagnosis Date Noted Date Resolved   No resolved problems to display.        Assessment & Plan  57 y.o. male with multiple medical comorbidities overall doing better from mental status stand point.  Hemoglobin remained stable at 9.1 from 9.5.  Creatinine is 2.7 from 2.6.  Baseline around 2.    Patient is very anxious to go home.  He does not want to go to rehabilitation.  If okay with other consultants I would be okay with discharging him today with appropriate support in place.  We will discuss with other consultants, medication changes, and follow-ups to determine disposition.      Mark Hawthorne MD  07/25/17  11:23 AM  Office Number [phone]

## 2017-07-25 NOTE — PLAN OF CARE
Problem: Fall Risk (Adult)  Goal: Absence of Falls  Outcome: Ongoing (interventions implemented as appropriate)    07/25/17 0331   Fall Risk (Adult)   Absence of Falls making progress toward outcome         Problem: Pain, Acute (Adult)  Goal: Acceptable Pain Control/Comfort Level  Outcome: Ongoing (interventions implemented as appropriate)    07/25/17 0331   Pain, Acute (Adult)   Acceptable Pain Control/Comfort Level making progress toward outcome         Problem: Infection, Risk/Actual (Adult)  Goal: Infection Prevention/Resolution  Outcome: Ongoing (interventions implemented as appropriate)    07/25/17 0331   Infection, Risk/Actual (Adult)   Infection Prevention/Resolution making progress toward outcome

## 2017-07-25 NOTE — PLAN OF CARE
Problem: Inpatient Physical Therapy  Goal: Transfer Training Goal 1 LTG- PT    07/25/17 0938   Transfer Training PT LTG   Transfer Training PT LTG, Date Established 07/25/17   Transfer Training PT LTG, Time to Achieve 5 - 7 days   Transfer Training PT LTG, Activity Type all transfers   Transfer Training PT LTG, Bowman Level contact guard assist       Goal: Gait Training Goal LTG- PT    07/17/17 1310 07/25/17 0938   Gait Training PT LTG   Gait Training Goal PT LTG, Date Established --  07/25/17   Gait Training Goal PT LTG, Time to Achieve --  5 - 7 days   Gait Training Goal PT LTG, Bowman Level contact guard assist;2 person assist required --    Gait Training Goal PT LTG, Distance to Achieve 20 --

## 2017-07-25 NOTE — PLAN OF CARE
Problem: Patient Care Overview (Adult)  Goal: Plan of Care Review  Outcome: Ongoing (interventions implemented as appropriate)    07/25/17 1511   Coping/Psychosocial Response Interventions   Plan Of Care Reviewed With patient   Outcome Evaluation   Outcome Summary/Follow up Plan Pt with improved functional indpendence as noted by sit<>stand and stand pivot t'fers. Pt reports that primary mode of locamotion will be PWC upon returning home.

## 2017-07-26 NOTE — PROGRESS NOTES
Continued Stay Note  [facility]     Patient Name: Jay Blackwood  MRN: [id number]  Today's Date: 7/26/2017    Admit Date: 7/14/2017          Discharge Plan       07/26/17 0825    Final Note    Final Note Pt discharged home with [facility] and IV antibiotics from [facility].                Discharge Codes       07/26/17 0826    Discharge Codes    Discharge Codes 06  Discharged/transferred to home under care of organized home health service organization in anticipation of skilled care        Expected Discharge Date and Time     Expected Discharge Date Expected Discharge Time    Jul 25, 2017             Sirisha Broussard

## 2017-07-30 ENCOUNTER — HOSPITAL ENCOUNTER (EMERGENCY)
Facility: HOSPITAL | Age: 57
Discharge: HOME | End: 2017-07-30
Attending: EMERGENCY MEDICINE | Admitting: EMERGENCY MEDICINE

## 2017-07-30 ENCOUNTER — APPOINTMENT (OUTPATIENT)
Dept: GENERAL RADIOLOGY | Facility: HOSPITAL | Age: 57
End: 2017-07-30

## 2017-07-30 VITALS
TEMPERATURE: 97.4 F | DIASTOLIC BLOOD PRESSURE: 101 MMHG | WEIGHT: 220 LBS | BODY MASS INDEX: 28.23 KG/M2 | RESPIRATION RATE: 16 BRPM | HEART RATE: 58 BPM | OXYGEN SATURATION: 97 % | SYSTOLIC BLOOD PRESSURE: 215 MMHG | HEIGHT: 74 IN

## 2017-07-30 DIAGNOSIS — T82.898A OCCLUDED PICC LINE, INITIAL ENCOUNTER (HCC): Primary | ICD-10-CM

## 2017-07-30 PROCEDURE — 25010000002 ALTEPLASE 2 MG RECONSTITUTED SOLUTION: Performed by: EMERGENCY MEDICINE

## 2017-07-30 PROCEDURE — 96374 THER/PROPH/DIAG INJ IV PUSH: CPT

## 2017-07-30 PROCEDURE — 99284 EMERGENCY DEPT VISIT MOD MDM: CPT

## 2017-07-30 PROCEDURE — 71020 HC CHEST PA AND LATERAL: CPT

## 2017-07-30 RX ORDER — HYDRALAZINE HYDROCHLORIDE 50 MG/1
100 TABLET, FILM COATED ORAL ONCE
Status: COMPLETED | OUTPATIENT
Start: 2017-07-30 | End: 2017-07-30

## 2017-07-30 RX ADMIN — HYDRALAZINE HYDROCHLORIDE 100 MG: 50 TABLET, FILM COATED ORAL at 20:09

## 2017-07-30 RX ADMIN — ALTEPLASE 2 MG: 2.2 INJECTION, POWDER, LYOPHILIZED, FOR SOLUTION INTRAVENOUS at 19:04

## 2017-08-25 ENCOUNTER — TELEPHONE (OUTPATIENT)
Dept: INFECTIOUS DISEASES | Facility: CLINIC | Age: 57
End: 2017-08-25

## 2017-08-25 NOTE — TELEPHONE ENCOUNTER
Patient missed appt with Dr. Jung today. I have left him several messages on his voicemail today to call and reschedule for Monday, 8/28/17 per Dr. Jung. I called and spoke to Marii Serrano RN at [facility] to draw his labs today since they missed them earlier in the week and add ESR and CRP per Dr. Jung. I also called [facility] and spoke to pharmacist, Celestine, to extend his IV ABX another week so we can be sure he gets in for appt with Dr. Jung before stopping med.--ZULEIMA Villegas RN

## 2017-08-30 ENCOUNTER — TELEPHONE (OUTPATIENT)
Dept: INFECTIOUS DISEASES | Facility: CLINIC | Age: 57
End: 2017-08-30

## 2017-08-30 NOTE — TELEPHONE ENCOUNTER
"I finally spoke to Mr. Blackwood on the phone. He said Home Health has not been able to draw his labs yet because he has \"been running around town to doctors appointments\". They are supposed to come tomorrow. I called the nurse with [facility] (Marii) at [phone] and she said that was correct and she will draw labs tomorrow and fax us results. I told her he may D/C ABX and have PICC line pulled on Friday. The patient stated he is \"completely healed\" and feeling great! I advised him to follow up with Dr. Jung and he said he would if he had any future problems but he feels like he is \"cured\". I also left a message for  with [facility] to let her know of the discontinue date of his ABX--CHECORN  "

## 2017-09-01 ENCOUNTER — TELEPHONE (OUTPATIENT)
Dept: INFECTIOUS DISEASES | Facility: CLINIC | Age: 57
End: 2017-09-01

## 2017-09-01 NOTE — TELEPHONE ENCOUNTER
"I got results faxed over from the blood work this patient had done yesterday by Home Health. His Vanco Trough level was <1.0 and I asked him if he had been infusing the ABX. He said his last dose was he thinks last Tuesday because \"my IV is plugged up\". I told him he could have contacted us or had his home health RN contact us if there was a problem with the IV and he said he didn't worry about it because he was pretty much done with the medicine anyway. He states his leg is healed and wants us to call Home Health to have PICC line removed today if that is okay with Dr. Jung. Please advise. His home health RN is Marii and her number is [phone]. Thanks, Liss  "

## 2017-09-01 NOTE — TELEPHONE ENCOUNTER
Phone with [facility] RN. I informed her per Dr. Jung it was okay to pull patient's PICC line today and he may D/C his ABX. -- ZULEIMA Villegas RN

## 2017-11-06 NOTE — PLAN OF CARE
Problem: Patient Care Overview (Adult)  Goal: Plan of Care Review  Outcome: Ongoing (interventions implemented as appropriate)    07/19/17 0634   Coping/Psychosocial Response Interventions   Plan Of Care Reviewed With patient   Patient Care Overview   Progress no change   Outcome Evaluation   Outcome Summary/Follow up Plan patient was drowsy at the beginning of my shift but oriented, as the shift continued, the patient respirations decreased and o2 saturation decreased. Patient had been given 1mg dialudid around 1800 by previous nurse, so narcan was given and respiratory was paged. Patient md was called and notified of change in status, stat CXR ordered as well as stat abg and am abg, patient currentl saO2 is in mid 90's on 6L high flow nasal cannula, cxr showed small opacity- possibly atelectatsis. Patient has been off vanc due to kidney function and began spiking a temp last night, heparin was subtherapeutic at 0030, so rate increased by 3 units/kg/hr and new ptt draw this am. dressing changed at beginning of shift and again around 0300 as it was coming loose- staples intact; picc line dressing changed at start of shift last night as assessment showed the dressing was moist and peeling off. bp's remain high, heart rhythm remains sinus, segal catheter out put clear light yellow urine; will continue to watch closely.          Problem: Fall Risk (Adult)  Goal: Identify Related Risk Factors and Signs and Symptoms  Outcome: Outcome(s) achieved Date Met:  07/19/17  Goal: Absence of Falls  Outcome: Ongoing (interventions implemented as appropriate)    Problem: Pain, Acute (Adult)  Goal: Identify Related Risk Factors and Signs and Symptoms  Outcome: Outcome(s) achieved Date Met:  07/19/17  Goal: Acceptable Pain Control/Comfort Level  Outcome: Ongoing (interventions implemented as appropriate)    Problem: Infection, Risk/Actual (Adult)  Goal: Identify Related Risk Factors and Signs and Symptoms  Outcome: Outcome(s)  achieved Date Met:  07/19/17  Goal: Infection Prevention/Resolution  Outcome: Ongoing (interventions implemented as appropriate)       none

## 2018-07-18 ENCOUNTER — OFFICE VISIT (OUTPATIENT)
Dept: SURGERY | Facility: CLINIC | Age: 58
End: 2018-07-18

## 2018-07-18 VITALS — BODY MASS INDEX: 26.51 KG/M2 | HEART RATE: 74 BPM | OXYGEN SATURATION: 98 % | WEIGHT: 200 LBS | HEIGHT: 73 IN

## 2018-07-18 DIAGNOSIS — D63.1 ANEMIA IN STAGE 5 CHRONIC KIDNEY DISEASE, NOT ON CHRONIC DIALYSIS (HCC): Primary | ICD-10-CM

## 2018-07-18 DIAGNOSIS — N18.5 ANEMIA IN STAGE 5 CHRONIC KIDNEY DISEASE, NOT ON CHRONIC DIALYSIS (HCC): Primary | ICD-10-CM

## 2018-07-18 DIAGNOSIS — K43.9 HERNIA OF ABDOMINAL WALL: ICD-10-CM

## 2018-07-18 PROCEDURE — 99204 OFFICE O/P NEW MOD 45 MIN: CPT | Performed by: SURGERY
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
## 2018-07-22 ENCOUNTER — HOSPITAL ENCOUNTER (OUTPATIENT)
Dept: CT IMAGING | Facility: HOSPITAL | Age: 58
Discharge: HOME | End: 2018-07-22
Attending: SURGERY | Admitting: SURGERY

## 2018-07-22 DIAGNOSIS — K43.9 HERNIA OF ABDOMINAL WALL: ICD-10-CM

## 2018-07-22 PROCEDURE — 74176 CT ABD & PELVIS W/O CONTRAST: CPT

## 2018-07-24 ENCOUNTER — TELEPHONE (OUTPATIENT)
Dept: SURGERY | Facility: CLINIC | Age: 58
End: 2018-07-24

## 2018-07-24 NOTE — TELEPHONE ENCOUNTER
I spoke with the patient and informed him of his CT results and Dr. Weller's recommendation to contact his PCP or Cardiologist. Patient verbalized understanding. CT report was forwarded to both physicians.

## 2018-07-24 NOTE — TELEPHONE ENCOUNTER
----- Message from Yemi Weller MD sent at 7/24/2018 10:37 AM EDT -----  Santosh Tejada,     Just checked this CT scan, patient needs to see his pcp or cards for large pleural effusion    Thank you

## 2018-08-08 ENCOUNTER — TELEPHONE (OUTPATIENT)
Dept: SURGERY | Facility: CLINIC | Age: 58
End: 2018-08-08

## 2018-08-08 NOTE — TELEPHONE ENCOUNTER
I spoke with the patient regarding his CT scan results.  He has multiple small abdominal wall defects.  This was not mentioned on radiology report.  He has a large pleural effusion and he is being treated by his cardiologist with increased dosing of Lasix.  Patient is due for colonoscopy and will have this done soon.  I think that because his multiple abdominal wall defect that he will not be candidate for peritoneal dialysis.  He could potentially receive peritoneal dialysis if no other medical of dialysis at available.  We discussed this with the patient after colonoscopy.  The patient verbalized understanding and agree with the plan

## 2019-03-22 ENCOUNTER — OFFICE VISIT (OUTPATIENT)
Dept: WOUND CARE | Facility: HOSPITAL | Age: 59
End: 2019-03-22

## 2019-03-22 ENCOUNTER — LAB REQUISITION (OUTPATIENT)
Dept: LAB | Facility: HOSPITAL | Age: 59
End: 2019-03-22

## 2019-03-22 ENCOUNTER — HOSPITAL ENCOUNTER (OUTPATIENT)
Dept: GENERAL RADIOLOGY | Facility: HOSPITAL | Age: 59
Discharge: HOME | End: 2019-03-22
Admitting: SURGERY

## 2019-03-22 DIAGNOSIS — Z00.00 ROUTINE GENERAL MEDICAL EXAMINATION AT A HEALTH CARE FACILITY: Primary | ICD-10-CM

## 2019-03-22 DIAGNOSIS — Z51.89 ENCOUNTER FOR WOUND CARE: ICD-10-CM

## 2019-03-22 PROCEDURE — 87075 CULTR BACTERIA EXCEPT BLOOD: CPT | Performed by: SURGERY

## 2019-03-22 PROCEDURE — 73130 X-RAY EXAM OF HAND: CPT

## 2019-03-22 PROCEDURE — G0463 HOSPITAL OUTPT CLINIC VISIT: HCPCS

## 2019-03-22 PROCEDURE — 87070 CULTURE OTHR SPECIMN AEROBIC: CPT | Performed by: SURGERY

## 2019-03-22 PROCEDURE — 87205 SMEAR GRAM STAIN: CPT | Performed by: SURGERY

## 2019-03-24 LAB
BACTERIA SPEC AEROBE CULT: NORMAL
GRAM STN SPEC: NORMAL

## 2019-03-27 LAB — BACTERIA SPEC ANAEROBE CULT: NORMAL

## 2019-04-05 ENCOUNTER — APPOINTMENT (OUTPATIENT)
Dept: WOUND CARE | Facility: HOSPITAL | Age: 59
End: 2019-04-05

## 2019-06-27 ENCOUNTER — LAB REQUISITION (OUTPATIENT)
Dept: LAB | Facility: HOSPITAL | Age: 59
End: 2019-06-27

## 2019-06-27 ENCOUNTER — OFFICE VISIT (OUTPATIENT)
Dept: WOUND CARE | Facility: HOSPITAL | Age: 59
End: 2019-06-27

## 2019-06-27 ENCOUNTER — APPOINTMENT (OUTPATIENT)
Dept: WOUND CARE | Facility: HOSPITAL | Age: 59
End: 2019-06-27

## 2019-06-27 DIAGNOSIS — E11.622 TYPE 2 DIABETES MELLITUS WITH OTHER SKIN ULCER (CODE) (HCC): ICD-10-CM

## 2019-06-27 DIAGNOSIS — Z00.00 ENCOUNTER FOR GENERAL ADULT MEDICAL EXAMINATION WITHOUT ABNORMAL FINDINGS: ICD-10-CM

## 2019-06-27 PROCEDURE — 87070 CULTURE OTHR SPECIMN AEROBIC: CPT | Performed by: SURGERY

## 2019-06-27 PROCEDURE — 87205 SMEAR GRAM STAIN: CPT | Performed by: SURGERY

## 2019-06-27 PROCEDURE — 87075 CULTR BACTERIA EXCEPT BLOOD: CPT | Performed by: SURGERY

## 2019-06-27 PROCEDURE — 87186 SC STD MICRODIL/AGAR DIL: CPT | Performed by: SURGERY

## 2019-06-27 PROCEDURE — G0463 HOSPITAL OUTPT CLINIC VISIT: HCPCS

## 2019-06-29 LAB
BACTERIA SPEC AEROBE CULT: ABNORMAL
GRAM STN SPEC: ABNORMAL

## 2019-06-30 LAB — BACTERIA SPEC ANAEROBE CULT: ABNORMAL

## 2019-07-02 LAB — BACTERIA SPEC ANAEROBE CULT: NORMAL

## 2019-07-05 ENCOUNTER — APPOINTMENT (OUTPATIENT)
Dept: WOUND CARE | Facility: HOSPITAL | Age: 59
End: 2019-07-05

## 2021-03-22 ENCOUNTER — BULK ORDERING (OUTPATIENT)
Dept: CASE MANAGEMENT | Facility: OTHER | Age: 61
End: 2021-03-22

## 2021-03-22 DIAGNOSIS — Z23 IMMUNIZATION DUE: ICD-10-CM
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
## (undated) DEVICE — SUT SILK 3/0 TIES 18IN A184H

## (undated) DEVICE — NDL HYPO PRECISIONGLIDE REG 25G 1 1/2

## (undated) DEVICE — APPL CHLORAPREP W/TINT 26ML ORNG

## (undated) DEVICE — BANDAGE,GAUZE,BULKEE II,4.5"X4.1YD,STRL: Brand: MEDLINE

## (undated) DEVICE — DUAL CUT SAGITTAL BLADE

## (undated) DEVICE — SUT SILK 2/0 TIES 18IN A185H

## (undated) DEVICE — PAD,ABDOMINAL,8"X10",ST,LF: Brand: MEDLINE

## (undated) DEVICE — BNDG COVERROLL ADHS 6IN 10YD

## (undated) DEVICE — GOWN,NON-REINFORCED,SIRUS,SET IN SLV,XL: Brand: MEDLINE

## (undated) DEVICE — 3M™ IOBAN™ 2 ANTIMICROBIAL INCISE DRAPE 6650EZ: Brand: IOBAN™ 2

## (undated) DEVICE — STPLR SKIN VISISTAT WD 35CT

## (undated) DEVICE — GLV SURG BIOGEL LTX PF 7

## (undated) DEVICE — TOWEL,OR,DSP,ST,BLUE,STD,4/PK,20PK/CS: Brand: MEDLINE

## (undated) DEVICE — ANTIBACTERIAL UNDYED BRAIDED (POLYGLACTIN 910), SYNTHETIC ABSORBABLE SUTURE: Brand: COATED VICRYL

## (undated) DEVICE — T-DRAPE,EXTREMITY,STERILE: Brand: MEDLINE

## (undated) DEVICE — PK ORTHO MAJ 40

## (undated) DEVICE — SUT SILK 2/0 SH CR5 18IN C0125

## (undated) DEVICE — GAUZE,SPONGE,FLUFF,6"X6.75",STRL,10/TRAY: Brand: MEDLINE

## (undated) DEVICE — BNDG ELAS ELITE V/CLOSE 6IN 5YD LF STRL

## (undated) DEVICE — SUT VIC 2/0 TIES 18IN J111T

## (undated) DEVICE — TOTAL TRAY, 16FR 10ML SIL FOLEY, URN: Brand: MEDLINE